# Patient Record
Sex: FEMALE | Race: OTHER | NOT HISPANIC OR LATINO
[De-identification: names, ages, dates, MRNs, and addresses within clinical notes are randomized per-mention and may not be internally consistent; named-entity substitution may affect disease eponyms.]

---

## 2017-02-24 PROBLEM — Z00.00 ENCOUNTER FOR PREVENTIVE HEALTH EXAMINATION: Status: ACTIVE | Noted: 2017-02-24

## 2017-03-15 ENCOUNTER — APPOINTMENT (OUTPATIENT)
Dept: OTOLARYNGOLOGY | Facility: CLINIC | Age: 37
End: 2017-03-15

## 2017-03-16 ENCOUNTER — APPOINTMENT (OUTPATIENT)
Dept: OTOLARYNGOLOGY | Facility: CLINIC | Age: 37
End: 2017-03-16

## 2017-04-05 ENCOUNTER — APPOINTMENT (OUTPATIENT)
Dept: OTOLARYNGOLOGY | Facility: CLINIC | Age: 37
End: 2017-04-05

## 2017-06-05 ENCOUNTER — OUTPATIENT (OUTPATIENT)
Dept: OUTPATIENT SERVICES | Facility: HOSPITAL | Age: 37
LOS: 1 days | Discharge: HOME | End: 2017-06-05

## 2017-06-05 DIAGNOSIS — J03.90 ACUTE TONSILLITIS, UNSPECIFIED: ICD-10-CM

## 2017-06-05 DIAGNOSIS — M06.9 RHEUMATOID ARTHRITIS, UNSPECIFIED: ICD-10-CM

## 2017-06-08 ENCOUNTER — OUTPATIENT (OUTPATIENT)
Dept: OUTPATIENT SERVICES | Facility: HOSPITAL | Age: 37
LOS: 1 days | Discharge: HOME | End: 2017-06-08

## 2017-06-08 DIAGNOSIS — M06.9 RHEUMATOID ARTHRITIS, UNSPECIFIED: ICD-10-CM

## 2017-06-08 DIAGNOSIS — J03.90 ACUTE TONSILLITIS, UNSPECIFIED: ICD-10-CM

## 2017-06-28 DIAGNOSIS — D17.22 BENIGN LIPOMATOUS NEOPLASM OF SKIN AND SUBCUTANEOUS TISSUE OF LEFT ARM: ICD-10-CM

## 2017-06-28 DIAGNOSIS — R22.42 LOCALIZED SWELLING, MASS AND LUMP, LEFT LOWER LIMB: ICD-10-CM

## 2017-06-28 DIAGNOSIS — Z85.850 PERSONAL HISTORY OF MALIGNANT NEOPLASM OF THYROID: ICD-10-CM

## 2017-06-28 DIAGNOSIS — D17.24 BENIGN LIPOMATOUS NEOPLASM OF SKIN AND SUBCUTANEOUS TISSUE OF LEFT LEG: ICD-10-CM

## 2017-06-28 DIAGNOSIS — E66.9 OBESITY, UNSPECIFIED: ICD-10-CM

## 2017-11-08 DIAGNOSIS — E66.9 OBESITY, UNSPECIFIED: ICD-10-CM

## 2017-11-08 DIAGNOSIS — G47.30 SLEEP APNEA, UNSPECIFIED: ICD-10-CM

## 2017-11-08 DIAGNOSIS — D17.9 BENIGN LIPOMATOUS NEOPLASM, UNSPECIFIED: ICD-10-CM

## 2017-11-08 DIAGNOSIS — Z01.818 ENCOUNTER FOR OTHER PREPROCEDURAL EXAMINATION: ICD-10-CM

## 2017-11-08 DIAGNOSIS — K21.9 GASTRO-ESOPHAGEAL REFLUX DISEASE WITHOUT ESOPHAGITIS: ICD-10-CM

## 2017-11-08 DIAGNOSIS — Z87.891 PERSONAL HISTORY OF NICOTINE DEPENDENCE: ICD-10-CM

## 2017-11-08 DIAGNOSIS — M06.9 RHEUMATOID ARTHRITIS, UNSPECIFIED: ICD-10-CM

## 2018-12-06 ENCOUNTER — HOSPITAL ENCOUNTER (EMERGENCY)
Facility: HOSPITAL | Age: 38
Discharge: HOME | End: 2018-12-06
Attending: EMERGENCY MEDICINE | Admitting: EMERGENCY MEDICINE
Payer: COMMERCIAL

## 2018-12-06 ENCOUNTER — APPOINTMENT (EMERGENCY)
Dept: RADIOLOGY | Facility: HOSPITAL | Age: 38
End: 2018-12-06
Payer: COMMERCIAL

## 2018-12-06 VITALS
OXYGEN SATURATION: 100 % | HEIGHT: 66 IN | HEART RATE: 94 BPM | RESPIRATION RATE: 18 BRPM | BODY MASS INDEX: 42.06 KG/M2 | TEMPERATURE: 97.8 F | SYSTOLIC BLOOD PRESSURE: 135 MMHG | WEIGHT: 261.69 LBS | DIASTOLIC BLOOD PRESSURE: 78 MMHG

## 2018-12-06 DIAGNOSIS — S90.511A ABRASION OF RIGHT ANKLE, INITIAL ENCOUNTER: Primary | ICD-10-CM
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
## 2018-12-06 PROCEDURE — 99284 EMERGENCY DEPT VISIT MOD MDM: CPT

## 2018-12-06 PROCEDURE — 73610 X-RAY EXAM OF ANKLE: CPT

## 2018-12-06 PROCEDURE — 73630 X-RAY EXAM OF FOOT: CPT

## 2018-12-06 RX ORDER — ACETAMINOPHEN 325 MG/1
650 TABLET ORAL ONCE
Status: COMPLETED | OUTPATIENT
Start: 2018-12-06 | End: 2018-12-06

## 2018-12-06 RX ORDER — NAPROXEN 500 MG/1
500 TABLET ORAL 2 TIMES DAILY PRN
Qty: 20 TABLET | Refills: 0 | Status: SHIPPED | OUTPATIENT
Start: 2018-12-06 | End: 2019-01-03 | Stop reason: ALTCHOICE

## 2018-12-06 RX ORDER — IBUPROFEN 600 MG/1
600 TABLET ORAL ONCE
Status: COMPLETED | OUTPATIENT
Start: 2018-12-06 | End: 2018-12-06

## 2018-12-06 RX ADMIN — ACETAMINOPHEN 650 MG: 325 TABLET, FILM COATED ORAL at 07:17

## 2018-12-06 RX ADMIN — IBUPROFEN 600 MG: 600 TABLET ORAL at 07:17
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
## 2018-12-06 NOTE — DISCHARGE INSTRUCTIONS
Abrasion   WHAT YOU NEED TO KNOW:   An abrasion is a scrape on your skin  It happens when your skin rubs against a rough surface  Some examples of an abrasion include rug burn, a skinned elbow, or road rash  Abrasions can be many shapes and sizes  The wound may hurt, bleed, bruise, or swell  DISCHARGE INSTRUCTIONS:   Return to the emergency department if:   · The bleeding does not stop after 10 minutes of firm pressure  · You cannot rinse one or more foreign objects out of your wound  · You have red streaks on your skin coming from your wound  Contact your healthcare provider if:   · You have a fever or chills  · Your abrasion is red, warm, swollen, or draining pus  · You have questions or concerns about your condition or care  Care for your abrasion:   · Wash your hands and dry them with a clean towel  · Press a clean cloth against your wound to stop any bleeding  · Rinse your wound with a lot of clean water  Do not use harsh soap, alcohol, or iodine solutions  · Use a clean, wet cloth to remove any objects, such as small pieces of rocks or dirt  · Rub antibiotic ointment on your wound  This may help prevent infection and help your wound heal     · Cover the wound with a non-stick bandage  Change the bandage daily, and if gets wet or dirty  Follow up with your healthcare provider as directed:  Write down your questions so you remember to ask them during your visits  © 2017 [address] Information is for End User's use only and may not be sold, redistributed or otherwise used for commercial purposes  All illustrations and images included in CareNotes® are the copyrighted property of [employer]  or [employer]  The above information is an  only  It is not intended as medical advice for individual conditions or treatments   Talk to your doctor, nurse or pharmacist before following any medical regimen to see if it is safe and effective for you

## 2018-12-10 ENCOUNTER — OFFICE VISIT (OUTPATIENT)
Dept: FAMILY MEDICINE CLINIC | Facility: CLINIC | Age: 38
End: 2018-12-10
Payer: COMMERCIAL

## 2018-12-10 VITALS
OXYGEN SATURATION: 98 % | SYSTOLIC BLOOD PRESSURE: 136 MMHG | BODY MASS INDEX: 41.59 KG/M2 | DIASTOLIC BLOOD PRESSURE: 72 MMHG | WEIGHT: 265 LBS | HEART RATE: 99 BPM | RESPIRATION RATE: 20 BRPM | HEIGHT: 67 IN | TEMPERATURE: 97.6 F

## 2018-12-10 DIAGNOSIS — E89.0 POST-SURGICAL HYPOTHYROIDISM: ICD-10-CM

## 2018-12-10 DIAGNOSIS — Z23 NEED FOR TDAP VACCINATION: ICD-10-CM

## 2018-12-10 DIAGNOSIS — Z23 NEED FOR INFLUENZA VACCINATION: ICD-10-CM

## 2018-12-10 DIAGNOSIS — S90.511A ABRASION OF RIGHT ANKLE, INITIAL ENCOUNTER: ICD-10-CM

## 2018-12-10 DIAGNOSIS — M06.9 RHEUMATOID ARTHRITIS INVOLVING MULTIPLE SITES, UNSPECIFIED RHEUMATOID FACTOR PRESENCE: Primary | ICD-10-CM

## 2018-12-10 DIAGNOSIS — F41.8 DEPRESSION WITH ANXIETY: ICD-10-CM

## 2018-12-10 PROBLEM — E66.01 MORBID OBESITY WITH BMI OF 40.0-44.9, ADULT (HCC): Status: ACTIVE | Noted: 2018-03-27

## 2018-12-10 PROBLEM — K21.9 GASTROESOPHAGEAL REFLUX DISEASE WITHOUT ESOPHAGITIS: Status: ACTIVE | Noted: 2018-03-27

## 2018-12-10 PROBLEM — C73 THYROID CANCER (HCC): Status: ACTIVE | Noted: 2018-03-27

## 2018-12-10 PROBLEM — S90.519A ABRASION OF ANKLE: Status: ACTIVE | Noted: 2018-12-10

## 2018-12-10 PROCEDURE — 90682 RIV4 VACC RECOMBINANT DNA IM: CPT | Performed by: NURSE PRACTITIONER

## 2018-12-10 PROCEDURE — 90471 IMMUNIZATION ADMIN: CPT | Performed by: NURSE PRACTITIONER

## 2018-12-10 PROCEDURE — 90715 TDAP VACCINE 7 YRS/> IM: CPT | Performed by: NURSE PRACTITIONER

## 2018-12-10 PROCEDURE — 1036F TOBACCO NON-USER: CPT | Performed by: NURSE PRACTITIONER

## 2018-12-10 PROCEDURE — 90472 IMMUNIZATION ADMIN EACH ADD: CPT | Performed by: NURSE PRACTITIONER

## 2018-12-10 PROCEDURE — 99204 OFFICE O/P NEW MOD 45 MIN: CPT | Performed by: NURSE PRACTITIONER

## 2018-12-10 PROCEDURE — 3008F BODY MASS INDEX DOCD: CPT | Performed by: NURSE PRACTITIONER

## 2018-12-10 RX ORDER — DOCUSATE SODIUM 100 MG/1
100 CAPSULE, LIQUID FILLED ORAL
COMMUNITY
End: 2020-04-20 | Stop reason: ALTCHOICE

## 2018-12-10 RX ORDER — LEVOTHYROXINE SODIUM 175 UG/1
200 TABLET ORAL
COMMUNITY
Start: 2018-05-14 | End: 2020-04-20 | Stop reason: SDUPTHER

## 2018-12-10 RX ORDER — CEFADROXIL 500 MG/1
500 CAPSULE ORAL EVERY 12 HOURS SCHEDULED
COMMUNITY
End: 2019-01-03 | Stop reason: ALTCHOICE

## 2018-12-10 RX ORDER — ESOMEPRAZOLE MAGNESIUM 40 MG/1
CAPSULE, DELAYED RELEASE ORAL
COMMUNITY
Start: 2016-08-25 | End: 2019-07-12 | Stop reason: ALTCHOICE

## 2018-12-10 RX ORDER — SULFAMETHOXAZOLE AND TRIMETHOPRIM 800; 160 MG/1; MG/1
2 TABLET ORAL EVERY 12 HOURS SCHEDULED
COMMUNITY
End: 2019-01-03 | Stop reason: ALTCHOICE

## 2018-12-10 RX ORDER — FOLIC ACID 1 MG/1
TABLET ORAL
COMMUNITY
Start: 2016-08-11

## 2018-12-10 NOTE — ASSESSMENT & PLAN NOTE
To continue Bactrim and Duricef as prescribed  Advised to begin probiotic  Tdap administered today after exam   Counseled on cleaning gently with eye to have bacterial soap and water and changing dressing daily or sooner if becomes moist or soiled  To call office for fever, increased pain, swelling, erythema, or warmth  Follow-up in 2 weeks for recheck

## 2018-12-10 NOTE — ASSESSMENT & PLAN NOTE
Will have patient sign medical release form to obtain prior records  To continue methotrexate    To continue care with rheumatologist

## 2018-12-10 NOTE — ASSESSMENT & PLAN NOTE
To continue current dose of levothyroxine  To obtain labs as ordered by the endocrinologist   Continue care with endocrinology

## 2018-12-10 NOTE — ASSESSMENT & PLAN NOTE
To begin Zoloft half tab x1 week then 1 tab daily  To continue counseling  To follow up in 2 weeks or sooner if symptoms worsen
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
## 2019-01-03 ENCOUNTER — OFFICE VISIT (OUTPATIENT)
Dept: FAMILY MEDICINE CLINIC | Facility: CLINIC | Age: 39
End: 2019-01-03
Payer: COMMERCIAL

## 2019-01-03 VITALS
OXYGEN SATURATION: 98 % | WEIGHT: 257 LBS | TEMPERATURE: 97 F | DIASTOLIC BLOOD PRESSURE: 72 MMHG | BODY MASS INDEX: 40.34 KG/M2 | SYSTOLIC BLOOD PRESSURE: 124 MMHG | HEIGHT: 67 IN | HEART RATE: 97 BPM | RESPIRATION RATE: 18 BRPM

## 2019-01-03 DIAGNOSIS — S90.511A ABRASION OF RIGHT ANKLE, INITIAL ENCOUNTER: ICD-10-CM

## 2019-01-03 DIAGNOSIS — F41.8 DEPRESSION WITH ANXIETY: ICD-10-CM

## 2019-01-03 PROCEDURE — 99214 OFFICE O/P EST MOD 30 MIN: CPT | Performed by: NURSE PRACTITIONER

## 2019-01-03 PROCEDURE — 3008F BODY MASS INDEX DOCD: CPT | Performed by: NURSE PRACTITIONER

## 2019-01-03 RX ORDER — SERTRALINE HYDROCHLORIDE 100 MG/1
TABLET, FILM COATED ORAL
Qty: 30 TABLET | Refills: 2 | Status: SHIPPED | OUTPATIENT
Start: 2019-01-03 | End: 2019-03-24 | Stop reason: SDUPTHER
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
## 2019-01-03 NOTE — ASSESSMENT & PLAN NOTE
Will increase Zoloft to 100 mg daily  To continue counseling  To follow up in 1 month if anxiety has not improved  Otherwise, follow up in 3 months

## 2019-01-25 ENCOUNTER — OFFICE VISIT (OUTPATIENT)
Dept: BEHAVIORAL/MENTAL HEALTH CLINIC | Facility: CLINIC | Age: 39
End: 2019-01-25
Payer: COMMERCIAL

## 2019-01-25 DIAGNOSIS — F43.10 POST TRAUMATIC STRESS DISORDER (PTSD): Primary | ICD-10-CM

## 2019-01-25 PROCEDURE — 90834 PSYTX W PT 45 MINUTES: CPT | Performed by: SOCIAL WORKER
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
## 2019-02-08 ENCOUNTER — OFFICE VISIT (OUTPATIENT)
Dept: BEHAVIORAL/MENTAL HEALTH CLINIC | Facility: CLINIC | Age: 39
End: 2019-02-08
Payer: COMMERCIAL

## 2019-02-08 DIAGNOSIS — F43.10 POST TRAUMATIC STRESS DISORDER (PTSD): Primary | ICD-10-CM

## 2019-02-08 PROCEDURE — 90834 PSYTX W PT 45 MINUTES: CPT | Performed by: SOCIAL WORKER
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
## 2019-02-15 ENCOUNTER — OFFICE VISIT (OUTPATIENT)
Dept: BEHAVIORAL/MENTAL HEALTH CLINIC | Facility: CLINIC | Age: 39
End: 2019-02-15
Payer: COMMERCIAL

## 2019-02-15 DIAGNOSIS — F43.10 POST TRAUMATIC STRESS DISORDER (PTSD): Primary | ICD-10-CM

## 2019-02-15 PROCEDURE — 90834 PSYTX W PT 45 MINUTES: CPT | Performed by: SOCIAL WORKER
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
## 2019-02-19 ENCOUNTER — OFFICE VISIT (OUTPATIENT)
Dept: BEHAVIORAL/MENTAL HEALTH CLINIC | Facility: CLINIC | Age: 39
End: 2019-02-19
Payer: COMMERCIAL

## 2019-02-19 DIAGNOSIS — F43.10 POST TRAUMATIC STRESS DISORDER (PTSD): Primary | ICD-10-CM

## 2019-02-19 PROCEDURE — 90834 PSYTX W PT 45 MINUTES: CPT | Performed by: SOCIAL WORKER
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
## 2019-03-01 ENCOUNTER — TELEPHONE (OUTPATIENT)
Dept: BEHAVIORAL/MENTAL HEALTH CLINIC | Facility: CLINIC | Age: 39
End: 2019-03-01

## 2019-03-07 ENCOUNTER — OFFICE VISIT (OUTPATIENT)
Dept: URGENT CARE | Age: 39
End: 2019-03-07
Payer: COMMERCIAL

## 2019-03-07 VITALS
HEART RATE: 84 BPM | TEMPERATURE: 98.9 F | WEIGHT: 255 LBS | OXYGEN SATURATION: 100 % | RESPIRATION RATE: 20 BRPM | DIASTOLIC BLOOD PRESSURE: 71 MMHG | HEIGHT: 67 IN | SYSTOLIC BLOOD PRESSURE: 137 MMHG | BODY MASS INDEX: 40.02 KG/M2

## 2019-03-07 DIAGNOSIS — J01.90 ACUTE SINUSITIS, RECURRENCE NOT SPECIFIED, UNSPECIFIED LOCATION: Primary | ICD-10-CM

## 2019-03-07 PROCEDURE — G0382 LEV 3 HOSP TYPE B ED VISIT: HCPCS | Performed by: FAMILY MEDICINE

## 2019-03-07 RX ORDER — AMOXICILLIN 500 MG/1
500 TABLET, FILM COATED ORAL 3 TIMES DAILY
Qty: 30 TABLET | Refills: 0 | Status: SHIPPED | OUTPATIENT
Start: 2019-03-07 | End: 2019-03-17

## 2019-03-07 NOTE — LETTER
March 7, 2019     Patient: Gayatri Kee   YOB: 1980   Date of Visit: 3/7/2019       To Whom It May Concern:    Patient seen in office today for illness  Please excuse from work         Sincerely,        Dulce Maria Pope PA-C    CC: No Recipients

## 2019-03-07 NOTE — PATIENT INSTRUCTIONS
Take medications as directed  May use Mucinex D 1/2 to 1 tablet twice a day with full glass of water for nasal congestion  May also use nasal saline spray for rinsing sinus passages  May continue NyQuil at bedtime as needed  Were symptoms typically last 8-10 days and slowly resolve over the next couple weeks  Follow up with family doctor if not improving as expected

## 2019-03-07 NOTE — PROGRESS NOTES
[facility]    NAME: Mazin Melvin is a 43 y o  female  : 1980    MRN: [id number]  DATE: 2019  TIME: 8:29 AM    Assessment and Plan   Acute sinusitis, recurrence not specified, unspecified location [J01 90]  1  Acute sinusitis, recurrence not specified, unspecified location  amoxicillin (AMOXIL) 500 MG tablet       Patient Instructions     Patient Instructions   Take medications as directed  May use Mucinex D 1/2 to 1 tablet twice a day with full glass of water for nasal congestion  May also use nasal saline spray for rinsing sinus passages  May continue NyQuil at bedtime as needed  Were symptoms typically last 8-10 days and slowly resolve over the next couple weeks  Follow up with family doctor if not improving as expected  Chief Complaint     Chief Complaint   Patient presents with    Cold Like Symptoms     c/o upper respiratory congestin x 3 days, denies n/v/d and bodyaches       History of Present Illness   Mazin Melvin presents to the clinic c/o  71-year-old female with complaint of nasal congestion drainage sinus pain pressure  She is on methotrexate  She has a history of nasal septal deviation  She has been taking over-the-counter Sudafed and NyQuil with minimal relief  Having trouble sleeping at night due to discomfort and difficulty breathing through nose  Review of Systems   Review of Systems   Constitutional: Positive for activity change and fatigue  Negative for chills  HENT: Positive for congestion, postnasal drip, rhinorrhea, sinus pressure and sinus pain  Negative for ear discharge, ear pain, hearing loss and sore throat  Eyes: Negative  Respiratory: Negative  Cardiovascular: Negative  Hematological: Negative          Current Medications     Long-Term Medications   Medication Sig Dispense Refill    docusate sodium (COLACE) 100 mg capsule Take 100 mg by mouth      esomeprazole (NexIUM) 40 MG capsule       folic acid (FOLVITE) 1 mg tablet       levothyroxine 175 mcg tablet 1 tab on 6 days of the week with 1 5 tabs on the 7th day of the week   methotrexate (RHEUMATREX) 2 5 MG tablet Take 12 5 mg by mouth      mupirocin (BACTROBAN) 2 % ointment Apply topically 3 (three) times a day (Patient not taking: Reported on 3/7/2019) 22 g 1    sertraline (ZOLOFT) 100 mg tablet 1 tab daily  30 tablet 2       Current Allergies     Allergies as of 03/07/2019    (No Known Allergies)          The following portions of the patient's history were reviewed and updated as appropriate: allergies, current medications, past family history, past medical history, past social history, past surgical history and problem list   Past Medical History:   Diagnosis Date    Arthritis     RA    Thyroid cancer ([address] )      Past Surgical History:   Procedure Laterality Date    CHOLECYSTECTOMY      KNEE SURGERY Left     ACL repair     THYROIDECTOMY       Family History   Problem Relation Age of Onset    Rheum arthritis Mother     COPD Mother     Seizures Mother     No Known Problems Father     Rheum arthritis Sister     Fibromyalgia Sister     Thyroid disease Sister     Hypertension Maternal Grandmother     Lung cancer Maternal Grandmother     Heart disease Maternal Grandmother     Heart disease Maternal Grandfather     Emphysema Maternal Grandfather        Objective   /71   Pulse 84   Temp 98 9 °F (37 2 °C) (Temporal)   Resp 20   Ht 5' 7" (1 702 m)   Wt 116 kg (255 lb)   LMP 02/06/2019   SpO2 100%   BMI 39 94 kg/m²   Patient's last menstrual period was 02/06/2019  Physical Exam     Physical Exam   Constitutional: She is oriented to person, place, and time  She appears well-developed and well-nourished  No distress  HENT:   Head: Normocephalic and atraumatic  Right Ear: External ear normal    Left Ear: External ear normal    Cobblestoning of posterior pharynx with patchy redness  No exudate or fetid breath    Nasal turbinates red and edematous  No purulent mucus  Sinuses tender to palpation   Eyes: Pupils are equal, round, and reactive to light  Conjunctivae and EOM are normal  Right eye exhibits no discharge  Left eye exhibits no discharge  Neck: Normal range of motion  Neck supple  Cardiovascular: Normal rate, regular rhythm and normal heart sounds  Exam reveals no gallop and no friction rub  No murmur heard  Pulmonary/Chest: Effort normal and breath sounds normal  No respiratory distress  She has no wheezes  She has no rales  Lymphadenopathy:     She has no cervical adenopathy  Neurological: She is alert and oriented to person, place, and time  Skin: Skin is warm and dry  No rash noted  She is not diaphoretic  Psychiatric: She has a normal mood and affect  Nursing note and vitals reviewed

## 2019-03-08 ENCOUNTER — OFFICE VISIT (OUTPATIENT)
Dept: BEHAVIORAL/MENTAL HEALTH CLINIC | Facility: CLINIC | Age: 39
End: 2019-03-08
Payer: COMMERCIAL

## 2019-03-08 DIAGNOSIS — F43.10 POST TRAUMATIC STRESS DISORDER (PTSD): Primary | ICD-10-CM

## 2019-03-08 PROCEDURE — 90834 PSYTX W PT 45 MINUTES: CPT | Performed by: SOCIAL WORKER
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
## 2019-03-15 ENCOUNTER — OFFICE VISIT (OUTPATIENT)
Dept: BEHAVIORAL/MENTAL HEALTH CLINIC | Facility: CLINIC | Age: 39
End: 2019-03-15
Payer: COMMERCIAL

## 2019-03-15 DIAGNOSIS — F43.10 POST TRAUMATIC STRESS DISORDER (PTSD): Primary | ICD-10-CM

## 2019-03-15 PROCEDURE — 90834 PSYTX W PT 45 MINUTES: CPT | Performed by: SOCIAL WORKER
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
## 2019-03-24 DIAGNOSIS — F41.8 DEPRESSION WITH ANXIETY: ICD-10-CM

## 2019-03-25 RX ORDER — SERTRALINE HYDROCHLORIDE 100 MG/1
TABLET, FILM COATED ORAL
Qty: 30 TABLET | Refills: 1 | Status: SHIPPED | OUTPATIENT
Start: 2019-03-25 | End: 2019-07-09 | Stop reason: SDUPTHER

## 2019-03-29 ENCOUNTER — OFFICE VISIT (OUTPATIENT)
Dept: BEHAVIORAL/MENTAL HEALTH CLINIC | Facility: CLINIC | Age: 39
End: 2019-03-29
Payer: COMMERCIAL

## 2019-03-29 DIAGNOSIS — F43.10 POST TRAUMATIC STRESS DISORDER (PTSD): Primary | ICD-10-CM

## 2019-03-29 PROCEDURE — 90834 PSYTX W PT 45 MINUTES: CPT | Performed by: SOCIAL WORKER
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
## 2019-04-05 ENCOUNTER — OFFICE VISIT (OUTPATIENT)
Dept: BEHAVIORAL/MENTAL HEALTH CLINIC | Facility: CLINIC | Age: 39
End: 2019-04-05
Payer: COMMERCIAL

## 2019-04-05 DIAGNOSIS — F43.10 POST TRAUMATIC STRESS DISORDER (PTSD): Primary | ICD-10-CM

## 2019-04-05 PROCEDURE — 90834 PSYTX W PT 45 MINUTES: CPT | Performed by: SOCIAL WORKER

## 2019-04-08 ENCOUNTER — OFFICE VISIT (OUTPATIENT)
Dept: FAMILY MEDICINE CLINIC | Facility: CLINIC | Age: 39
End: 2019-04-08
Payer: COMMERCIAL

## 2019-04-08 VITALS
TEMPERATURE: 97.4 F | OXYGEN SATURATION: 98 % | HEART RATE: 100 BPM | RESPIRATION RATE: 16 BRPM | BODY MASS INDEX: 40.97 KG/M2 | WEIGHT: 261 LBS | SYSTOLIC BLOOD PRESSURE: 110 MMHG | HEIGHT: 67 IN | DIASTOLIC BLOOD PRESSURE: 70 MMHG

## 2019-04-08 DIAGNOSIS — F41.8 DEPRESSION WITH ANXIETY: ICD-10-CM

## 2019-04-08 DIAGNOSIS — N92.0 MENORRHAGIA WITH REGULAR CYCLE: ICD-10-CM

## 2019-04-08 DIAGNOSIS — G47.33 OBSTRUCTIVE SLEEP APNEA SYNDROME: Primary | ICD-10-CM

## 2019-04-08 DIAGNOSIS — E66.01 MORBID OBESITY WITH BMI OF 40.0-44.9, ADULT (HCC): ICD-10-CM

## 2019-04-08 PROBLEM — S90.519A ABRASION OF ANKLE: Status: RESOLVED | Noted: 2018-12-10 | Resolved: 2019-04-08

## 2019-04-08 PROBLEM — G47.30 SLEEP APNEA: Status: ACTIVE | Noted: 2019-04-08

## 2019-04-08 PROCEDURE — 99214 OFFICE O/P EST MOD 30 MIN: CPT | Performed by: NURSE PRACTITIONER

## 2019-04-08 PROCEDURE — 1036F TOBACCO NON-USER: CPT | Performed by: NURSE PRACTITIONER

## 2019-04-08 PROCEDURE — 3008F BODY MASS INDEX DOCD: CPT | Performed by: NURSE PRACTITIONER

## 2019-04-08 RX ORDER — BUSPIRONE HYDROCHLORIDE 5 MG/1
5 TABLET ORAL 3 TIMES DAILY
Qty: 90 TABLET | Refills: 0 | Status: SHIPPED | OUTPATIENT
Start: 2019-04-08 | End: 2019-05-28 | Stop reason: DRUGHIGH

## 2019-04-12 ENCOUNTER — OFFICE VISIT (OUTPATIENT)
Dept: BEHAVIORAL/MENTAL HEALTH CLINIC | Facility: CLINIC | Age: 39
End: 2019-04-12
Payer: COMMERCIAL

## 2019-04-12 DIAGNOSIS — F43.10 POST TRAUMATIC STRESS DISORDER (PTSD): Primary | ICD-10-CM

## 2019-04-12 PROCEDURE — 90834 PSYTX W PT 45 MINUTES: CPT | Performed by: SOCIAL WORKER

## 2019-04-19 ENCOUNTER — OFFICE VISIT (OUTPATIENT)
Dept: BEHAVIORAL/MENTAL HEALTH CLINIC | Facility: CLINIC | Age: 39
End: 2019-04-19
Payer: COMMERCIAL

## 2019-04-19 DIAGNOSIS — F43.10 POST TRAUMATIC STRESS DISORDER (PTSD): Primary | ICD-10-CM

## 2019-04-19 PROCEDURE — 90834 PSYTX W PT 45 MINUTES: CPT | Performed by: SOCIAL WORKER

## 2019-05-03 ENCOUNTER — OFFICE VISIT (OUTPATIENT)
Dept: BEHAVIORAL/MENTAL HEALTH CLINIC | Facility: CLINIC | Age: 39
End: 2019-05-03
Payer: COMMERCIAL

## 2019-05-03 DIAGNOSIS — F43.10 POST TRAUMATIC STRESS DISORDER (PTSD): Primary | ICD-10-CM

## 2019-05-03 PROCEDURE — 90834 PSYTX W PT 45 MINUTES: CPT | Performed by: SOCIAL WORKER

## 2019-05-10 ENCOUNTER — TELEPHONE (OUTPATIENT)
Dept: BEHAVIORAL/MENTAL HEALTH CLINIC | Facility: CLINIC | Age: 39
End: 2019-05-10

## 2019-05-17 ENCOUNTER — OFFICE VISIT (OUTPATIENT)
Dept: BEHAVIORAL/MENTAL HEALTH CLINIC | Facility: CLINIC | Age: 39
End: 2019-05-17
Payer: COMMERCIAL

## 2019-05-17 DIAGNOSIS — F43.10 POST TRAUMATIC STRESS DISORDER (PTSD): Primary | ICD-10-CM

## 2019-05-17 PROCEDURE — 90834 PSYTX W PT 45 MINUTES: CPT | Performed by: SOCIAL WORKER

## 2019-05-24 ENCOUNTER — OFFICE VISIT (OUTPATIENT)
Dept: BEHAVIORAL/MENTAL HEALTH CLINIC | Facility: CLINIC | Age: 39
End: 2019-05-24
Payer: COMMERCIAL

## 2019-05-24 DIAGNOSIS — F43.10 POST TRAUMATIC STRESS DISORDER (PTSD): Primary | ICD-10-CM

## 2019-05-24 PROCEDURE — 90834 PSYTX W PT 45 MINUTES: CPT | Performed by: SOCIAL WORKER

## 2019-05-28 ENCOUNTER — OFFICE VISIT (OUTPATIENT)
Dept: FAMILY MEDICINE CLINIC | Facility: CLINIC | Age: 39
End: 2019-05-28
Payer: COMMERCIAL

## 2019-05-28 VITALS
WEIGHT: 267 LBS | DIASTOLIC BLOOD PRESSURE: 82 MMHG | OXYGEN SATURATION: 98 % | SYSTOLIC BLOOD PRESSURE: 118 MMHG | TEMPERATURE: 98.1 F | HEIGHT: 67 IN | HEART RATE: 78 BPM | BODY MASS INDEX: 41.91 KG/M2

## 2019-05-28 DIAGNOSIS — F41.8 DEPRESSION WITH ANXIETY: Primary | ICD-10-CM

## 2019-05-28 DIAGNOSIS — B49 FUNGAL INFECTION: ICD-10-CM

## 2019-05-28 DIAGNOSIS — B37.9 YEAST INFECTION: ICD-10-CM

## 2019-05-28 DIAGNOSIS — R73.01 IMPAIRED FASTING GLUCOSE: ICD-10-CM

## 2019-05-28 LAB — SL AMB POCT HEMOGLOBIN AIC: 5.6 (ref ?–6.5)

## 2019-05-28 PROCEDURE — 36415 COLL VENOUS BLD VENIPUNCTURE: CPT | Performed by: NURSE PRACTITIONER

## 2019-05-28 PROCEDURE — 99214 OFFICE O/P EST MOD 30 MIN: CPT | Performed by: NURSE PRACTITIONER

## 2019-05-28 PROCEDURE — 3008F BODY MASS INDEX DOCD: CPT | Performed by: NURSE PRACTITIONER

## 2019-05-28 PROCEDURE — 83036 HEMOGLOBIN GLYCOSYLATED A1C: CPT | Performed by: NURSE PRACTITIONER

## 2019-05-28 RX ORDER — NYSTATIN 100000 [USP'U]/G
POWDER TOPICAL 3 TIMES DAILY
Qty: 15 G | Refills: 0 | Status: SHIPPED | OUTPATIENT
Start: 2019-05-28 | End: 2020-08-20 | Stop reason: ALTCHOICE

## 2019-05-28 RX ORDER — FLUCONAZOLE 150 MG/1
150 TABLET ORAL DAILY
Qty: 2 TABLET | Refills: 0 | Status: SHIPPED | OUTPATIENT
Start: 2019-05-28 | End: 2019-05-30

## 2019-05-28 RX ORDER — BUSPIRONE HYDROCHLORIDE 7.5 MG/1
7.5 TABLET ORAL 3 TIMES DAILY
Qty: 30 TABLET | Refills: 0 | Status: SHIPPED | OUTPATIENT
Start: 2019-05-28 | End: 2019-07-09 | Stop reason: SDUPTHER

## 2019-05-29 PROBLEM — R73.01 IMPAIRED FASTING GLUCOSE: Status: ACTIVE | Noted: 2019-05-29

## 2019-05-29 RX ORDER — KETOCONAZOLE 20 MG/G
CREAM TOPICAL DAILY
Qty: 15 G | Refills: 0 | Status: SHIPPED | OUTPATIENT
Start: 2019-05-29 | End: 2020-03-13

## 2019-06-04 ENCOUNTER — CONSULT (OUTPATIENT)
Dept: PULMONOLOGY | Facility: CLINIC | Age: 39
End: 2019-06-04
Payer: COMMERCIAL

## 2019-06-04 VITALS
HEIGHT: 67 IN | DIASTOLIC BLOOD PRESSURE: 80 MMHG | HEART RATE: 82 BPM | OXYGEN SATURATION: 98 % | SYSTOLIC BLOOD PRESSURE: 120 MMHG | WEIGHT: 265 LBS | BODY MASS INDEX: 41.59 KG/M2

## 2019-06-04 DIAGNOSIS — E66.01 MORBID OBESITY WITH BMI OF 40.0-44.9, ADULT (HCC): ICD-10-CM

## 2019-06-04 DIAGNOSIS — G47.33 OSA (OBSTRUCTIVE SLEEP APNEA): Primary | ICD-10-CM

## 2019-06-04 PROCEDURE — 99244 OFF/OP CNSLTJ NEW/EST MOD 40: CPT | Performed by: PHYSICIAN ASSISTANT

## 2019-06-10 ENCOUNTER — TELEPHONE (OUTPATIENT)
Dept: PULMONOLOGY | Facility: CLINIC | Age: 39
End: 2019-06-10

## 2019-06-10 ENCOUNTER — TELEPHONE (OUTPATIENT)
Dept: BEHAVIORAL/MENTAL HEALTH CLINIC | Facility: CLINIC | Age: 39
End: 2019-06-10

## 2019-06-10 DIAGNOSIS — G47.33 OSA (OBSTRUCTIVE SLEEP APNEA): Primary | ICD-10-CM

## 2019-06-14 ENCOUNTER — OFFICE VISIT (OUTPATIENT)
Dept: BEHAVIORAL/MENTAL HEALTH CLINIC | Facility: CLINIC | Age: 39
End: 2019-06-14
Payer: COMMERCIAL

## 2019-06-14 DIAGNOSIS — F43.10 POST TRAUMATIC STRESS DISORDER (PTSD): Primary | ICD-10-CM

## 2019-06-14 PROCEDURE — 90834 PSYTX W PT 45 MINUTES: CPT | Performed by: SOCIAL WORKER

## 2019-06-17 ENCOUNTER — TELEPHONE (OUTPATIENT)
Dept: SLEEP CENTER | Facility: CLINIC | Age: 39
End: 2019-06-17

## 2019-06-20 ENCOUNTER — HOSPITAL ENCOUNTER (EMERGENCY)
Facility: HOSPITAL | Age: 39
Discharge: HOME | End: 2019-06-21
Attending: EMERGENCY MEDICINE | Admitting: EMERGENCY MEDICINE
Payer: COMMERCIAL

## 2019-06-20 ENCOUNTER — APPOINTMENT (EMERGENCY)
Dept: CT IMAGING | Facility: HOSPITAL | Age: 39
End: 2019-06-20
Payer: COMMERCIAL

## 2019-06-20 DIAGNOSIS — R10.9 ABDOMINAL PAIN: Primary | ICD-10-CM

## 2019-06-20 LAB
ALBUMIN SERPL BCP-MCNC: 3.8 G/DL (ref 3.5–5)
ALP SERPL-CCNC: 50 U/L (ref 46–116)
ALT SERPL W P-5'-P-CCNC: 28 U/L (ref 12–78)
ANION GAP SERPL CALCULATED.3IONS-SCNC: 8 MMOL/L (ref 4–13)
AST SERPL W P-5'-P-CCNC: 22 U/L (ref 5–45)
BACTERIA UR QL AUTO: ABNORMAL /HPF
BASOPHILS # BLD AUTO: 0.04 THOUSANDS/ΜL (ref 0–0.1)
BASOPHILS NFR BLD AUTO: 0 % (ref 0–1)
BILIRUB DIRECT SERPL-MCNC: 0.03 MG/DL (ref 0–0.2)
BILIRUB SERPL-MCNC: 0.2 MG/DL (ref 0.2–1)
BILIRUB UR QL STRIP: NEGATIVE
BUN SERPL-MCNC: 12 MG/DL (ref 5–25)
CALCIUM SERPL-MCNC: 8 MG/DL (ref 8.3–10.1)
CHLORIDE SERPL-SCNC: 102 MMOL/L (ref 100–108)
CLARITY UR: CLEAR
CO2 SERPL-SCNC: 28 MMOL/L (ref 21–32)
COLOR UR: YELLOW
CREAT SERPL-MCNC: 1.01 MG/DL (ref 0.6–1.3)
EOSINOPHIL # BLD AUTO: 0.11 THOUSAND/ΜL (ref 0–0.61)
EOSINOPHIL NFR BLD AUTO: 1 % (ref 0–6)
ERYTHROCYTE [DISTWIDTH] IN BLOOD BY AUTOMATED COUNT: 13.3 % (ref 11.6–15.1)
GFR SERPL CREATININE-BSD FRML MDRD: 70 ML/MIN/1.73SQ M
GLUCOSE SERPL-MCNC: 105 MG/DL (ref 65–140)
GLUCOSE UR STRIP-MCNC: NEGATIVE MG/DL
HCG SERPL QL: NEGATIVE
HCT VFR BLD AUTO: 42.4 % (ref 34.8–46.1)
HGB BLD-MCNC: 13.8 G/DL (ref 11.5–15.4)
HGB UR QL STRIP.AUTO: ABNORMAL
KETONES UR STRIP-MCNC: NEGATIVE MG/DL
LEUKOCYTE ESTERASE UR QL STRIP: NEGATIVE
LIPASE SERPL-CCNC: 90 U/L (ref 73–393)
LYMPHOCYTES # BLD AUTO: 1.88 THOUSANDS/ΜL (ref 0.6–4.47)
LYMPHOCYTES NFR BLD AUTO: 20 % (ref 14–44)
MCH RBC QN AUTO: 29.8 PG (ref 26.8–34.3)
MCHC RBC AUTO-ENTMCNC: 32.5 G/DL (ref 31.4–37.4)
MCV RBC AUTO: 92 FL (ref 82–98)
MONOCYTES # BLD AUTO: 0.64 THOUSAND/ΜL (ref 0.17–1.22)
MONOCYTES NFR BLD AUTO: 7 % (ref 4–12)
NEUTROPHILS # BLD AUTO: 6.81 THOUSANDS/ΜL (ref 1.85–7.62)
NEUTS SEG NFR BLD AUTO: 72 % (ref 43–75)
NITRITE UR QL STRIP: NEGATIVE
NON-SQ EPI CELLS URNS QL MICRO: ABNORMAL /HPF
PH UR STRIP.AUTO: 6 [PH]
PLATELET # BLD AUTO: 377 THOUSANDS/UL (ref 149–390)
PMV BLD AUTO: 9.8 FL (ref 8.9–12.7)
POTASSIUM SERPL-SCNC: 3.8 MMOL/L (ref 3.5–5.3)
PROT SERPL-MCNC: 7.8 G/DL (ref 6.4–8.2)
PROT UR STRIP-MCNC: NEGATIVE MG/DL
RBC # BLD AUTO: 4.63 MILLION/UL (ref 3.81–5.12)
RBC #/AREA URNS AUTO: ABNORMAL /HPF
SODIUM SERPL-SCNC: 138 MMOL/L (ref 136–145)
SP GR UR STRIP.AUTO: 1.01 (ref 1–1.03)
UROBILINOGEN UR QL STRIP.AUTO: 1 E.U./DL
WBC # BLD AUTO: 9.48 THOUSAND/UL (ref 4.31–10.16)
WBC #/AREA URNS AUTO: ABNORMAL /HPF

## 2019-06-20 PROCEDURE — 80076 HEPATIC FUNCTION PANEL: CPT | Performed by: EMERGENCY MEDICINE

## 2019-06-20 PROCEDURE — 99285 EMERGENCY DEPT VISIT HI MDM: CPT | Performed by: EMERGENCY MEDICINE

## 2019-06-20 PROCEDURE — 85025 COMPLETE CBC W/AUTO DIFF WBC: CPT | Performed by: EMERGENCY MEDICINE

## 2019-06-20 PROCEDURE — 81001 URINALYSIS AUTO W/SCOPE: CPT | Performed by: EMERGENCY MEDICINE

## 2019-06-20 PROCEDURE — 74177 CT ABD & PELVIS W/CONTRAST: CPT

## 2019-06-20 PROCEDURE — 36415 COLL VENOUS BLD VENIPUNCTURE: CPT | Performed by: EMERGENCY MEDICINE

## 2019-06-20 PROCEDURE — 96375 TX/PRO/DX INJ NEW DRUG ADDON: CPT

## 2019-06-20 PROCEDURE — 96374 THER/PROPH/DIAG INJ IV PUSH: CPT

## 2019-06-20 PROCEDURE — 84703 CHORIONIC GONADOTROPIN ASSAY: CPT | Performed by: EMERGENCY MEDICINE

## 2019-06-20 PROCEDURE — 83690 ASSAY OF LIPASE: CPT | Performed by: EMERGENCY MEDICINE

## 2019-06-20 PROCEDURE — 80048 BASIC METABOLIC PNL TOTAL CA: CPT | Performed by: EMERGENCY MEDICINE

## 2019-06-20 PROCEDURE — 99284 EMERGENCY DEPT VISIT MOD MDM: CPT

## 2019-06-20 RX ORDER — ONDANSETRON 2 MG/ML
4 INJECTION INTRAMUSCULAR; INTRAVENOUS ONCE
Status: COMPLETED | OUTPATIENT
Start: 2019-06-20 | End: 2019-06-20

## 2019-06-20 RX ORDER — HYDROMORPHONE HCL/PF 1 MG/ML
1 SYRINGE (ML) INJECTION ONCE
Status: COMPLETED | OUTPATIENT
Start: 2019-06-20 | End: 2019-06-20

## 2019-06-20 RX ADMIN — HYDROMORPHONE HYDROCHLORIDE 1 MG: 1 INJECTION, SOLUTION INTRAMUSCULAR; INTRAVENOUS; SUBCUTANEOUS at 22:39

## 2019-06-20 RX ADMIN — IOHEXOL 100 ML: 350 INJECTION, SOLUTION INTRAVENOUS at 23:38

## 2019-06-20 RX ADMIN — ONDANSETRON 4 MG: 2 INJECTION INTRAMUSCULAR; INTRAVENOUS at 22:39

## 2019-06-21 VITALS
TEMPERATURE: 97.7 F | RESPIRATION RATE: 18 BRPM | HEART RATE: 78 BPM | SYSTOLIC BLOOD PRESSURE: 102 MMHG | DIASTOLIC BLOOD PRESSURE: 56 MMHG | OXYGEN SATURATION: 98 %

## 2019-06-21 RX ORDER — DICYCLOMINE HCL 20 MG
20 TABLET ORAL 2 TIMES DAILY
Qty: 20 TABLET | Refills: 0 | Status: SHIPPED | OUTPATIENT
Start: 2019-06-21 | End: 2019-07-12 | Stop reason: ALTCHOICE

## 2019-06-28 ENCOUNTER — SOCIAL WORK (OUTPATIENT)
Dept: BEHAVIORAL/MENTAL HEALTH CLINIC | Facility: CLINIC | Age: 39
End: 2019-06-28
Payer: COMMERCIAL

## 2019-06-28 DIAGNOSIS — F41.1 GENERALIZED ANXIETY DISORDER: Primary | ICD-10-CM

## 2019-06-28 PROCEDURE — 90834 PSYTX W PT 45 MINUTES: CPT | Performed by: SOCIAL WORKER

## 2019-07-09 DIAGNOSIS — F41.8 DEPRESSION WITH ANXIETY: ICD-10-CM

## 2019-07-09 RX ORDER — SERTRALINE HYDROCHLORIDE 100 MG/1
TABLET, FILM COATED ORAL
Qty: 30 TABLET | Refills: 1 | Status: SHIPPED | OUTPATIENT
Start: 2019-07-09 | End: 2019-09-03 | Stop reason: SDUPTHER

## 2019-07-09 RX ORDER — BUSPIRONE HYDROCHLORIDE 7.5 MG/1
7.5 TABLET ORAL 3 TIMES DAILY
Qty: 30 TABLET | Refills: 0 | Status: SHIPPED | OUTPATIENT
Start: 2019-07-09 | End: 2020-03-13

## 2019-07-10 ENCOUNTER — OFFICE VISIT (OUTPATIENT)
Dept: GASTROENTEROLOGY | Facility: CLINIC | Age: 39
End: 2019-07-10
Payer: COMMERCIAL

## 2019-07-10 ENCOUNTER — PREP FOR PROCEDURE (OUTPATIENT)
Dept: GASTROENTEROLOGY | Facility: CLINIC | Age: 39
End: 2019-07-10

## 2019-07-10 VITALS
SYSTOLIC BLOOD PRESSURE: 102 MMHG | HEIGHT: 67 IN | HEART RATE: 77 BPM | BODY MASS INDEX: 41.97 KG/M2 | WEIGHT: 267.4 LBS | DIASTOLIC BLOOD PRESSURE: 70 MMHG

## 2019-07-10 DIAGNOSIS — R10.32 LEFT LOWER QUADRANT PAIN: Primary | ICD-10-CM

## 2019-07-10 PROCEDURE — 99204 OFFICE O/P NEW MOD 45 MIN: CPT | Performed by: PHYSICIAN ASSISTANT

## 2019-07-10 NOTE — PROGRESS NOTES
[facility] - Outpatient Consultation  Matthew Giordano 43 y o  female MRN: [id number]  Encounter: [id number]          ASSESSMENT AND PLAN:      1  Left lower quadrant pain  She has a long history of recurring left-sided abdominal pain  She was previously seeing a [facility] and has had several colonoscopies and an upper endoscopy  Will update testing  Obtain old records  She does have associated diarrhea-suspect patient has irritable bowel syndrome  Discussed starting a fiber supplement and probiotic  Use Donnatal as needed for pain  She has acid reflux and no prescription coverage - samples of Prilosec  provided she has significant nighttime symptoms thus will take twice daily    ______________________________________________________________________    HPI:  39-year-old female presents for evaluation of abdominal pain  Patient reports she has had recurring left-sided abdominal pain for the past several years  Recently the pain has become more persistent and severe  It is associated with diarrhea and nausea  She went to the emergency room on June 20th due to rectal bleeding  CT scan and labs were unremarkable  She previously been seen a gastroenterologist in [place]  She reports that she had several colonoscopies in the past   She believes her last exam was 4 5 years ago  She has a history adenomatous polyps  She also has a history of acid reflux and has had an upper endoscopy  She believes she was diagnosed with ulcers  She has been taking Nexium 40 mg daily  Although it does help she still reports significant nocturnal symptoms  She reports that she wakes up in the middle of the night frequent only with chest pressure and more recently with regurgitation  She does admit to increased stress over the past few months  She denies any unexpected weight loss  REVIEW OF SYSTEMS:    CONSTITUTIONAL: Denies any fever, chills, rigors, and weight loss    HEENT: No earache or tinnitus  Denies hearing loss or visual disturbances  CARDIOVASCULAR: No chest pain or palpitations  RESPIRATORY: Denies any cough, hemoptysis, shortness of breath or dyspnea on exertion  GASTROINTESTINAL: As noted in the History of Present Illness  GENITOURINARY: No problems with urination  Denies any hematuria or dysuria  NEUROLOGIC: No dizziness or vertigo, denies headaches  MUSCULOSKELETAL: Denies any muscle or joint pain  SKIN: Denies skin rashes or itching  ENDOCRINE: Denies excessive thirst  Denies intolerance to heat or cold  PSYCHOSOCIAL: Denies depression or anxiety  Denies any recent memory loss         Historical Information   Past Medical History:   Diagnosis Date    Arthritis     RA    Thyroid cancer ([facility] [address] )      Past Surgical History:   Procedure Laterality Date    CHOLECYSTECTOMY      KNEE SURGERY Left     ACL repair     KNEE SURGERY      THYROIDECTOMY       Social History   Social History     Substance and Sexual Activity   Alcohol Use Not on file    Comment: social     Social History     Substance and Sexual Activity   Drug Use Never     Social History     Tobacco Use   Smoking Status Former Smoker   Smokeless Tobacco Never Used   Tobacco Comment    quit 5 years      Family History   Problem Relation Age of Onset    Rheum arthritis Mother     COPD Mother     Seizures Mother     No Known Problems Father     Rheum arthritis Sister     Fibromyalgia Sister     Thyroid disease Sister     Hypertension Maternal Grandmother     Lung cancer Maternal Grandmother     Heart disease Maternal Grandmother     Heart disease Maternal Grandfather     Emphysema Maternal Grandfather        Meds/Allergies       Current Outpatient Medications:     busPIRone (BUSPAR) 7 5 mg tablet    Cholecalciferol (VITAMIN D-3) 5000 units TABS    dicyclomine (BENTYL) 20 mg tablet    docusate sodium (COLACE) 100 mg capsule    esomeprazole (NexIUM) 40 MG capsule    folic acid (FOLVITE) 1 mg tablet    ketoconazole (NIZORAL) 2 % cream    levothyroxine 175 mcg tablet    methotrexate (RHEUMATREX) 2 5 MG tablet    nystatin (MYCOSTATIN) powder    sertraline (ZOLOFT) 100 mg tablet    No Known Allergies        Objective     Blood pressure 102/70, pulse 77, height 5' 7" (1 702 m), weight 121 kg (267 lb 6 4 oz), last menstrual period 06/20/2019  Body mass index is 41 88 kg/m²  PHYSICAL EXAM:      General Appearance:   Alert, cooperative, no distress   HEENT:   Normocephalic, atraumatic, anicteric      Neck:  Supple, symmetrical, trachea midline   Lungs:   Clear to auscultation bilaterally; no rales, rhonchi or wheezing; respirations unlabored    Heart[de-identified]   Regular rate and rhythm; no murmur, rub, or gallop  Abdomen:   Soft, non-tender, non-distended; normal bowel sounds; no masses, no organomegaly    Genitalia:   Deferred    Rectal:   Deferred    Extremities:  No cyanosis, clubbing or edema    Pulses:  2+ and symmetric    Skin:  No jaundice, rashes, or lesions    Lymph nodes:  No palpable cervical lymphadenopathy        Lab Results:   No visits with results within 1 Day(s) from this visit     Latest known visit with results is:   Admission on 06/20/2019, Discharged on 06/21/2019   Component Date Value    Sodium 06/20/2019 138     Potassium 06/20/2019 3 8     Chloride 06/20/2019 102     CO2 06/20/2019 28     ANION GAP 06/20/2019 8     BUN 06/20/2019 12     Creatinine 06/20/2019 1 01     Glucose 06/20/2019 105     Calcium 06/20/2019 8 0*    eGFR 06/20/2019 70     Lipase 06/20/2019 90     Total Bilirubin 06/20/2019 0 20     Bilirubin, Direct 06/20/2019 0 03     Alkaline Phosphatase 06/20/2019 50     AST 06/20/2019 22     ALT 06/20/2019 28     Total Protein 06/20/2019 7 8     Albumin 06/20/2019 3 8     WBC 06/20/2019 9 48     RBC 06/20/2019 4 63     Hemoglobin 06/20/2019 13 8     Hematocrit 06/20/2019 42 4     MCV 06/20/2019 92     MCH 06/20/2019 29 8     MCHC 06/20/2019 32 5  RDW 06/20/2019 13 3     MPV 06/20/2019 9 8     Platelets 08/93/8912 377     Neutrophils Relative 06/20/2019 72     Lymphocytes Relative 06/20/2019 20     Monocytes Relative 06/20/2019 7     Eosinophils Relative 06/20/2019 1     Basophils Relative 06/20/2019 0     Neutrophils Absolute 06/20/2019 6 81     Lymphocytes Absolute 06/20/2019 1 88     Monocytes Absolute 06/20/2019 0 64     Eosinophils Absolute 06/20/2019 0 11     Basophils Absolute 06/20/2019 0 04     Color, UA 06/20/2019 Yellow     Clarity, UA 06/20/2019 Clear     Specific Gravity, UA 06/20/2019 1 015     pH, UA 06/20/2019 6 0     Leukocytes, UA 06/20/2019 Negative     Nitrite, UA 06/20/2019 Negative     Protein, UA 06/20/2019 Negative     Glucose, UA 06/20/2019 Negative     Ketones, UA 06/20/2019 Negative     Urobilinogen, UA 06/20/2019 1 0     Bilirubin, UA 06/20/2019 Negative     Blood, UA 06/20/2019 Moderate*    Preg, Serum 06/20/2019 Negative     RBC, UA 06/20/2019 2-4*    WBC, UA 06/20/2019 0-1*    Epithelial Cells 06/20/2019 Occasional     Bacteria, UA 06/20/2019 None Seen          Radiology Results:   Ct Abdomen Pelvis With Contrast    Result Date: 6/21/2019  Narrative: CT ABDOMEN AND PELVIS WITH IV CONTRAST INDICATION:   LLQ abdominal pain eval diverticulitis  COMPARISON:  None  TECHNIQUE:  CT examination of the abdomen and pelvis was performed  Axial, sagittal, and coronal 2D reformatted images were created from the source data and submitted for interpretation  Radiation dose length product (DLP) for this visit:  9633 0859 mGy-cm   This examination, like all CT scans performed in the [facility], was performed utilizing techniques to minimize radiation dose exposure, including the use of iterative reconstruction and automated exposure control  IV Contrast:  100 mL of iohexol (OMNIPAQUE) Enteric Contrast:  Enteric contrast was not administered   FINDINGS: ABDOMEN LOWER CHEST:  No clinically significant abnormality identified in the visualized lower chest  LIVER/BILIARY TREE:  Right hepatic lobe hemangioma  GALLBLADDER:  Gallbladder is surgically absent  SPLEEN:  Unremarkable  PANCREAS:  Unremarkable  ADRENAL GLANDS:  Unremarkable  KIDNEYS/URETERS:  One or more sharply circumscribed subcentimeter renal hypodensities are noted  These lesions are too small to accurately characterize, but are statistically most likely to represent benign cortical renal cyst(s)  According to the guidelines published in the [facility] Paper of the ACR Incidental Findings Committee (Radiology 2010), no further workup of these lesions is recommended  STOMACH AND BOWEL:  Unremarkable  APPENDIX:  No findings to suggest appendicitis  ABDOMINOPELVIC CAVITY:  No ascites or free intraperitoneal air  No lymphadenopathy  VESSELS:  Unremarkable for patient's age  PELVIS REPRODUCTIVE ORGANS:  Unremarkable for patient's age  URINARY BLADDER:  Unremarkable  ABDOMINAL WALL/INGUINAL REGIONS:  Unremarkable  OSSEOUS STRUCTURES:  No acute fracture or destructive osseous lesion  Impression: No evidence of acute intra-abdominal pelvic pathology   Workstation performed: [id number]

## 2019-07-11 ENCOUNTER — SOCIAL WORK (OUTPATIENT)
Dept: BEHAVIORAL/MENTAL HEALTH CLINIC | Facility: CLINIC | Age: 39
End: 2019-07-11
Payer: COMMERCIAL

## 2019-07-11 DIAGNOSIS — F41.1 GENERALIZED ANXIETY DISORDER: Primary | ICD-10-CM

## 2019-07-11 PROCEDURE — 90834 PSYTX W PT 45 MINUTES: CPT | Performed by: SOCIAL WORKER

## 2019-07-11 NOTE — PSYCH
Time In 8:30 am, Time Out 9:15 am Session length 45 minutes  Assessment/Plan:   Client continues to make progress, regarding insight in to past poor coping and implementing improved healthier coping skills  Diagnoses and all orders for this visit:    Generalized anxiety disorder          Subjective: Client reported some increased emotional reactivity this week as she addressed the issue of the relationship with gary  Patient ID: Mao Summers is a 43 y o  female  Client reported that feeling that she had to leave the relationship while Lobo was not in the house, she left last weekend, and then realized that she was "running" away as she has in the past and needs to do something different  With support of her sister and friend, she was able to return to the house, talk with Lobo and the kids, and map out that they will give the relationship till the end of the year  They talked about identifying three of four items that need to happen in order to measure progress, Lobo is now in therapy, and returning to school  Client was proud of herself for recognizing her past pattern how it was effecting her and those she loves and change her course in a healthy manner  Session focused on how far client had come, processed how she was able to recognize behavior and how she feels with her course correction  Client gave praise to her stepson for his insight and that together the family was able to process what occurred and how to move forward in a healthy way  She is unclear if Lobo will be able to move forward but now they are on the same page regarding what is needed  She will be seen again in two weeks  Review of Systems   Psychiatric/Behavioral: The patient is nervous/anxious  Objective:  Client was neatly dressed, fully oriented, made consistent eye contact and demonstrated insight        Physical Exam   Psychiatric: Her speech is normal and behavior is normal  Judgment and thought content normal  Her mood appears anxious   Cognition and memory are normal    No SI/HI or self harming behavior

## 2019-07-12 ENCOUNTER — OFFICE VISIT (OUTPATIENT)
Dept: OBGYN CLINIC | Facility: CLINIC | Age: 39
End: 2019-07-12
Payer: COMMERCIAL

## 2019-07-12 ENCOUNTER — TELEPHONE (OUTPATIENT)
Dept: FAMILY MEDICINE CLINIC | Facility: CLINIC | Age: 39
End: 2019-07-12

## 2019-07-12 VITALS
SYSTOLIC BLOOD PRESSURE: 110 MMHG | BODY MASS INDEX: 42.06 KG/M2 | DIASTOLIC BLOOD PRESSURE: 80 MMHG | HEIGHT: 67 IN | WEIGHT: 268 LBS

## 2019-07-12 DIAGNOSIS — Z01.419 ENCOUNTER FOR GYNECOLOGICAL EXAMINATION WITHOUT ABNORMAL FINDING: ICD-10-CM

## 2019-07-12 DIAGNOSIS — Z11.3 SCREEN FOR STD (SEXUALLY TRANSMITTED DISEASE): ICD-10-CM

## 2019-07-12 PROCEDURE — 87491 CHLMYD TRACH DNA AMP PROBE: CPT | Performed by: NURSE PRACTITIONER

## 2019-07-12 PROCEDURE — 87624 HPV HI-RISK TYP POOLED RSLT: CPT | Performed by: NURSE PRACTITIONER

## 2019-07-12 PROCEDURE — G0145 SCR C/V CYTO,THINLAYER,RESCR: HCPCS | Performed by: NURSE PRACTITIONER

## 2019-07-12 PROCEDURE — 87591 N.GONORRHOEAE DNA AMP PROB: CPT | Performed by: NURSE PRACTITIONER

## 2019-07-12 PROCEDURE — 99385 PREV VISIT NEW AGE 18-39: CPT | Performed by: NURSE PRACTITIONER

## 2019-07-12 RX ORDER — OMEPRAZOLE 20 MG/1
20 CAPSULE, DELAYED RELEASE ORAL DAILY
COMMUNITY
End: 2020-03-13

## 2019-07-12 NOTE — PATIENT INSTRUCTIONS
Menorrhagia   WHAT YOU NEED TO KNOW:   What is menorrhagia? Menorrhagia is heavy menstrual bleeding for more than 7 days or severe menstrual bleeding for less than 7 days  Your menstrual bleeding and cramping are so heavy that you have trouble doing your usual daily activities  Your monthly period may also occur more often, and you may bleed between periods  Menorrhagia is common in adolescence and around menopause  What causes menorrhagia? · A hormone imbalance    · Ovaries do not work correctly and cannot produce eggs    · Uterine fibroids or growths (polyps) on the lining of your uterus    · Adenomyosis (thickening of your uterus)    · A pelvic infection or intrauterine device (IUD)    · Complications of pregnancy, such as miscarriage or an ectopic pregnancy    · Conditions such as blood coagulation disorders or uterine cancer  What increases my risk for menorrhagia? · Obesity    · Not having children    · Use of blood thinner medicine    · Abnormal structure of your reproductive organs    · Family history of endometrial or colon cancer  What are the signs and symptoms of menorrhagia? · Soaking a pad or tampon every 1 to 2 hours    · Using both a pad and a tampon    · Waking up at night to change your pad or tampon    · Blood clots with your bleeding for more than 1 day    · Abdominal pain or cramps  How is menorrhagia diagnosed? Your healthcare provider will ask about your symptoms and examine you  Tell him how often you change your pad or tampon  He may examine you for other signs of bleeding, such as bruises or bleeding gums  He may ask if anything relieves your pain, such as heat or medicine  Tell your healthcare provider if you are sexually active or have ever been pregnant  You may need any of the following:  · A blood test  will check for pregnancy and the cause of your blood loss  A blood test will also show anemia caused by menorrhagia      · A pelvic exam and Pap smear  may be needed to check the size and shape of your uterus and ovaries  Your healthcare provider gently inserts a warmed speculum into your vagina  A speculum is a tool that opens your vagina to show your cervix  · A biopsy  is a procedure to remove a small piece of tissue from the endometrium  The tissue is sent to a lab for tests  · An ultrasound  may show the cause of your bleeding  Sound waves are used to show pictures on a monitor  · A hysteroscopy  is a procedure to look at your endometrium  The endometrium is the lining inside of your uterus  Your healthcare provider will insert a small tube with a camera at the end into your uterus  How is menorrhagia treated? · Medicines:      ¨ Iron supplements  may be given if your blood iron level decreases because of heavy bleeding  ¨ NSAIDs , such as ibuprofen, treat menstrual cramps  This medicine is available with or without a doctor's order  NSAIDs can cause stomach bleeding or kidney problems in certain people  If you take blood thinner medicine, always ask your healthcare provider if NSAIDs are safe for you  Always read the medicine label and follow directions  ¨ Hormones  help slow or stop your bleeding and make your monthly periods more regular  This medicine may be given as birth control pills or an intrauterine device (IUD)  · Surgery and procedures  may be needed if medicines do not work or cannot be used  You may need procedures to control bleeding, such as endometrial ablation or a D&C (dilation and curettage)  You may need uterine embolization or a hysterectomy  Ask your healthcare provider about these or other procedures you may need  How can I manage my symptoms? · Keep a supply of pads or tampons with you at all times  If possible, stay close to a bathroom  · Apply heat  on your abdomen for 20 to 30 minutes every 2 hours for as many days as directed  Heat helps decrease pain and cramps    Call 911 for any of the following:   · You have chest pain and shortness of breath  · Your heart is fluttering or beating faster than usual for you  When should I seek immediate care? · You feel dizzy when you stand  · You feel confused  · You have severe abdominal pain, nausea, and vomiting  · Your skin or the whites of your eyes turn yellow  When should I contact my healthcare provider? · You need to change your pad or tampon more than 1 time per hour, for several hours in a row  · You feel more weak and tired than usual      · You have new coldness in your hands and feet  · You have questions or concerns about your condition or care  CARE AGREEMENT:   You have the right to help plan your care  Learn about your health condition and how it may be treated  Discuss treatment options with your caregivers to decide what care you want to receive  You always have the right to refuse treatment  The above information is an  only  It is not intended as medical advice for individual conditions or treatments  Talk to your doctor, nurse or pharmacist before following any medical regimen to see if it is safe and effective for you  © 2017 [facility] Information is for End User's use only and may not be sold, redistributed or otherwise used for commercial purposes  All illustrations and images included in CareNotes® are the copyrighted property of [employer]  or [employer]

## 2019-07-12 NOTE — PROGRESS NOTES
Diagnoses and all orders for this visit:    1  Encounter for gynecological examination without abnormal finding  -     Liquid-based pap, screening  Pap collected today, discussed new guidelines  Healthy eating and nutrition, daily exercise discussed and SBE reinforced  Call with any issues and all questions and concerns addressed  Call if periods become more bothersome in the future  2  Screen for STD (sexually transmitted disease)  -     Chlamydia/GC amplified DNA by PCR  -     HIV 1/2 AG-AB combo; Future  -     Hepatitis B surface antigen; Future  -     Hepatitis C antibody; Future  -     RPR; Future    Other orders  -     omeprazole (PriLOSEC) 20 mg delayed release capsule; Take 20 mg by mouth daily      All questions and concerns answered  Patient to call with any questions  Pleasant 43 y o  nulliparous, premenopausal female here for new patient annual exam    She is having heavier menses, and about 3 months she had a "heavier period" but stated that last two menses where back to her "normal"  Reports regular cycles  Declines any hormonal birth control today  Denies history of abnormal pap smears  Last Pap 2017, negative but pt requests pap done today  Denies vaginal, urinary or breast issues, today  Denies pelvic pain  Denies any issues with her BCM, which is  Sexually active with females, without any concerns and pt requests STD testing including blood work  Denies F/C/N/V      Past Medical History:   Diagnosis Date    Arthritis     RA    Thyroid cancer ([facility] [address] )     Varicella      Past Surgical History:   Procedure Laterality Date    CHOLECYSTECTOMY      KNEE SURGERY Left     ACL repair     KNEE SURGERY      THYROIDECTOMY       Family History   Problem Relation Age of Onset    Rheum arthritis Mother     COPD Mother     Seizures Mother     No Known Problems Father     Rheum arthritis Sister     Fibromyalgia Sister     Thyroid disease Sister     Hypertension Maternal Grandmother  Lung cancer Maternal Grandmother     Heart disease Maternal Grandmother     Heart disease Maternal Grandfather     Emphysema Maternal Grandfather     Breast cancer Maternal Aunt     Colon cancer Neg Hx     Ovarian cancer Neg Hx     Cervical cancer Neg Hx     Uterine cancer Neg Hx      Social History     Tobacco Use    Smoking status: Former Smoker    Smokeless tobacco: Never Used    Tobacco comment: quit 5 years    Substance Use Topics    Alcohol use: Yes     Comment: socially    Drug use: Never       Current Outpatient Medications:     busPIRone (BUSPAR) 7 5 mg tablet, Take 1 tablet (7 5 mg total) by mouth 3 (three) times a day, Disp: 30 tablet, Rfl: 0    Cholecalciferol (VITAMIN D-3) 5000 units TABS, Take 5,000 Units by mouth, Disp: , Rfl:     docusate sodium (COLACE) 100 mg capsule, Take 100 mg by mouth, Disp: , Rfl:     folic acid (FOLVITE) 1 mg tablet, , Disp: , Rfl:     ketoconazole (NIZORAL) 2 % cream, Apply topically daily, Disp: 15 g, Rfl: 0    levothyroxine 175 mcg tablet, 1 tab on 6 days of the week with 1 5 tabs on the 7th day of the week , Disp: , Rfl:     methotrexate (RHEUMATREX) 2 5 MG tablet, Take 12 5 mg by mouth, Disp: , Rfl:     nystatin (MYCOSTATIN) powder, Apply topically 3 (three) times a day, Disp: 15 g, Rfl: 0    omeprazole (PriLOSEC) 20 mg delayed release capsule, Take 20 mg by mouth daily, Disp: , Rfl:     sertraline (ZOLOFT) 100 mg tablet, take 1 tablet by mouth once daily, Disp: 30 tablet, Rfl: 1  Patient Active Problem List    Diagnosis Date Noted    Impaired fasting glucose 05/29/2019    Fungal infection 05/28/2019    Yeast infection 05/28/2019    Sleep apnea 04/08/2019    Menorrhagia with regular cycle 04/08/2019    Post traumatic stress disorder (PTSD) 01/25/2019    Depression with anxiety 12/10/2018    Gastroesophageal reflux disease without esophagitis 03/27/2018    Morbid obesity with BMI of 40 0-44 9, adult ([facility] ) 03/27/2018    Post-surgical hypothyroidism 2018    Rheumatoid arthritis involving multiple sites ([facility] ) 2018    Thyroid cancer ([facility] ) 2018       No Known Allergies    OB History    Para Term  AB Living   0 0 0 0 0 0   SAB TAB Ectopic Multiple Live Births   0 0 0 0 0     She has a domestic partner, and works as a [employer] member  Vitals:    19 1421   BP: 110/80   BP Location: Right arm   Patient Position: Sitting   Cuff Size: Standard   Weight: 122 kg (268 lb)   Height: 5' 7" (1 702 m)     Body mass index is 41 97 kg/m²  Review of Systems   Constitutional: Negative for chills, fatigue, fever and unexpected weight change  Respiratory: Negative for shortness of breath  Gastrointestinal: Negative for anal bleeding, blood in stool, constipation and diarrhea  Genitourinary: Negative for difficulty urinating, dysuria and hematuria  OBGyn Exam    Physical Exam   Constitutional: She appears well-developed and well-nourished  No distress  Head: Normocephalic  Neck: Normal range of motion  Neck supple  Pulmonary: Effort normal   Breasts: bilateral without masses, skin changes or nipple discharge  Bilaterally soft and warm to touch  No areas of erythema or pain  Abdominal: Soft  Non-tender, Obese  Pelvic exam was performed with patient supine  No labial fusion  There is no rash, tenderness, lesion or injury on the right labia  There is no rash, tenderness, lesion or injury on the left labia  Uterus is not deviated, not enlarged, not fixed and not tender  Cervix exhibits no motion tenderness, no discharge and no friability  Right adnexum displays no mass, no tenderness and no fullness  Left adnexum displays no mass, no tenderness and no fullness  No erythema or tenderness in the vagina  No foreign body in the vagina  No signs of injury around the vagina  No vaginal discharge found  PAP/STD collected w/o difficulty  Lymphadenopathy:        Right: No inguinal adenopathy present  Left: No inguinal adenopathy present

## 2019-07-15 ENCOUNTER — HOSPITAL ENCOUNTER (OUTPATIENT)
Dept: SLEEP CENTER | Facility: CLINIC | Age: 39
Discharge: HOME | End: 2019-07-15
Payer: COMMERCIAL

## 2019-07-15 DIAGNOSIS — G47.33 OSA (OBSTRUCTIVE SLEEP APNEA): ICD-10-CM

## 2019-07-15 LAB
C TRACH DNA SPEC QL NAA+PROBE: NEGATIVE
HPV HR 12 DNA CVX QL NAA+PROBE: NEGATIVE
HPV16 DNA CVX QL NAA+PROBE: NEGATIVE
HPV18 DNA CVX QL NAA+PROBE: NEGATIVE
N GONORRHOEA DNA SPEC QL NAA+PROBE: NEGATIVE

## 2019-07-15 PROCEDURE — G0399 HOME SLEEP TEST/TYPE 3 PORTA: HCPCS

## 2019-07-15 PROCEDURE — 95806 SLEEP STUDY UNATT&RESP EFFT: CPT | Performed by: INTERNAL MEDICINE

## 2019-07-17 LAB
LAB AP GYN PRIMARY INTERPRETATION: NORMAL
Lab: NORMAL

## 2019-07-18 DIAGNOSIS — G47.33 OSA (OBSTRUCTIVE SLEEP APNEA): Primary | ICD-10-CM

## 2019-07-19 ENCOUNTER — TELEPHONE (OUTPATIENT)
Dept: PULMONOLOGY | Facility: CLINIC | Age: 39
End: 2019-07-19

## 2019-07-19 ENCOUNTER — TELEPHONE (OUTPATIENT)
Dept: GASTROENTEROLOGY | Facility: CLINIC | Age: 39
End: 2019-07-19

## 2019-07-25 ENCOUNTER — ANESTHESIA (OUTPATIENT)
Dept: GASTROENTEROLOGY | Facility: HOSPITAL | Age: 39
End: 2019-07-25

## 2019-07-25 ENCOUNTER — TREATMENT (OUTPATIENT)
Dept: GASTROENTEROLOGY | Facility: CLINIC | Age: 39
End: 2019-07-25

## 2019-07-25 ENCOUNTER — HOSPITAL ENCOUNTER (OUTPATIENT)
Dept: GASTROENTEROLOGY | Facility: HOSPITAL | Age: 39
Setting detail: OUTPATIENT SURGERY
Discharge: HOME | End: 2019-07-25
Attending: INTERNAL MEDICINE | Admitting: INTERNAL MEDICINE
Payer: COMMERCIAL

## 2019-07-25 ENCOUNTER — ANESTHESIA EVENT (OUTPATIENT)
Dept: GASTROENTEROLOGY | Facility: HOSPITAL | Age: 39
End: 2019-07-25

## 2019-07-25 VITALS
HEART RATE: 75 BPM | DIASTOLIC BLOOD PRESSURE: 71 MMHG | BODY MASS INDEX: 41.42 KG/M2 | SYSTOLIC BLOOD PRESSURE: 121 MMHG | RESPIRATION RATE: 21 BRPM | HEIGHT: 67 IN | WEIGHT: 263.89 LBS | OXYGEN SATURATION: 97 % | TEMPERATURE: 97 F

## 2019-07-25 DIAGNOSIS — R10.13 DYSPEPSIA: Primary | ICD-10-CM

## 2019-07-25 DIAGNOSIS — R10.32 LEFT LOWER QUADRANT PAIN: ICD-10-CM

## 2019-07-25 LAB
EXT PREGNANCY TEST URINE: NEGATIVE
EXT. CONTROL: NORMAL

## 2019-07-25 PROCEDURE — 88305 TISSUE EXAM BY PATHOLOGIST: CPT | Performed by: PATHOLOGY

## 2019-07-25 PROCEDURE — 43239 EGD BIOPSY SINGLE/MULTIPLE: CPT | Performed by: INTERNAL MEDICINE

## 2019-07-25 PROCEDURE — 45380 COLONOSCOPY AND BIOPSY: CPT | Performed by: INTERNAL MEDICINE

## 2019-07-25 PROCEDURE — 81025 URINE PREGNANCY TEST: CPT | Performed by: ANESTHESIOLOGY

## 2019-07-25 RX ORDER — PROPOFOL 10 MG/ML
INJECTION, EMULSION INTRAVENOUS AS NEEDED
Status: DISCONTINUED | OUTPATIENT
Start: 2019-07-25 | End: 2019-07-25 | Stop reason: SURG

## 2019-07-25 RX ORDER — LIDOCAINE HYDROCHLORIDE 10 MG/ML
INJECTION, SOLUTION EPIDURAL; INFILTRATION; INTRACAUDAL; PERINEURAL AS NEEDED
Status: DISCONTINUED | OUTPATIENT
Start: 2019-07-25 | End: 2019-07-25 | Stop reason: SURG

## 2019-07-25 RX ORDER — SODIUM CHLORIDE, SODIUM LACTATE, POTASSIUM CHLORIDE, CALCIUM CHLORIDE 600; 310; 30; 20 MG/100ML; MG/100ML; MG/100ML; MG/100ML
INJECTION, SOLUTION INTRAVENOUS CONTINUOUS PRN
Status: DISCONTINUED | OUTPATIENT
Start: 2019-07-25 | End: 2019-07-25 | Stop reason: SURG

## 2019-07-25 RX ORDER — PANTOPRAZOLE SODIUM 40 MG/1
40 TABLET, DELAYED RELEASE ORAL DAILY
Qty: 31 TABLET | Refills: 6 | Status: SHIPPED | OUTPATIENT
Start: 2019-07-25 | End: 2020-03-13 | Stop reason: SDUPTHER

## 2019-07-25 RX ADMIN — PROPOFOL 100 MG: 10 INJECTION, EMULSION INTRAVENOUS at 14:02

## 2019-07-25 RX ADMIN — PROPOFOL 100 MG: 10 INJECTION, EMULSION INTRAVENOUS at 14:05

## 2019-07-25 RX ADMIN — PROPOFOL 100 MG: 10 INJECTION, EMULSION INTRAVENOUS at 14:11

## 2019-07-25 RX ADMIN — SODIUM CHLORIDE, SODIUM LACTATE, POTASSIUM CHLORIDE, AND CALCIUM CHLORIDE: .6; .31; .03; .02 INJECTION, SOLUTION INTRAVENOUS at 13:37

## 2019-07-25 RX ADMIN — PROPOFOL 20 MG: 10 INJECTION, EMULSION INTRAVENOUS at 14:18

## 2019-07-25 RX ADMIN — LIDOCAINE HYDROCHLORIDE 50 MG: 10 INJECTION, SOLUTION EPIDURAL; INFILTRATION; INTRACAUDAL; PERINEURAL at 14:02

## 2019-07-25 NOTE — ANESTHESIA PREPROCEDURE EVALUATION
Review of Systems/Medical History  Patient summary reviewed  Chart reviewed      Cardiovascular   Pulmonary  Sleep apnea Sleep Study completed,        GI/Hepatic    GERD ,        Negative  ROS        Endo/Other  History of thyroid disease , hypothyroidism,   Comment: Thyroid Ca Obesity  morbid obesity   GYN       Hematology  Negative hematology ROS      Musculoskeletal  Rheumatoid arthritis ,   Comment: Rheumatoid arthritis Arthritis     Neurology  Negative neurology ROS      Psychology   Anxiety,              Physical Exam    Airway    Mallampati score: II  TM Distance: >3 FB       Dental   Comment: Poor dentition  Multiple missing, chipped teeth  Evident decay throughout mout,     Cardiovascular  Rhythm: regular, Rate: normal,     Pulmonary  Breath sounds clear to auscultation,     Other Findings        Anesthesia Plan  ASA Score- 3     Anesthesia Type- IV sedation with anesthesia with ASA Monitors  Additional Monitors:   Airway Plan:         Plan Factors-Patient not instructed to abstain from smoking on day of procedure  Patient did not smoke on day of surgery  Induction- intravenous  Postoperative Plan-     Informed Consent- Anesthetic plan and risks discussed with patient  I personally reviewed this patient with the CRNA  Discussed and agreed on the Anesthesia Plan with the CRNA  Lurdes Aguila

## 2019-07-25 NOTE — ANESTHESIA POSTPROCEDURE EVALUATION
Post-Op Assessment Note    CV Status:  Stable  Pain Score: 0    Pain management: adequate     Mental Status:  Sleepy   Hydration Status:  Stable   PONV Controlled:  None   Airway Patency:  Patent   Post Op Vitals Reviewed: Yes      Staff: Anesthesiologist, CRNA           /58 (07/25/19 1423)    Temp      Pulse 65 (07/25/19 1423)   Resp 16 (07/25/19 1423)    SpO2 100 % (07/25/19 1423)

## 2019-07-25 NOTE — DISCHARGE INSTRUCTIONS

## 2019-07-25 NOTE — H&P
History and Physical -  [facility]  Patti Rosario 43 y o  female MRN: [id number]      HPI: Patti Rosario is a 43y o  year old female who presents for abdominal pain, acid reflux, rectal bleeding      REVIEW OF SYSTEMS: Per the HPI, and otherwise unremarkable  Historical Information   Past Medical History:   Diagnosis Date    Arthritis     RA    Colon polyp     Sleep apnea     Thyroid cancer ([address] )     Varicella      Past Surgical History:   Procedure Laterality Date    CHOLECYSTECTOMY      COLONOSCOPY      KNEE SURGERY Left     ACL repair     KNEE SURGERY      SKIN GRAFT Right     thumb    THYROIDECTOMY       Social History   Social History     Substance and Sexual Activity   Alcohol Use Yes    Frequency: 2-4 times a month    Comment: socially     Social History     Substance and Sexual Activity   Drug Use Never     Social History     Tobacco Use   Smoking Status Former Smoker    Last attempt to quit: 2015    Years since quittin 0   Smokeless Tobacco Never Used   Tobacco Comment    quit 5 years      Family History   Problem Relation Age of Onset    Rheum arthritis Mother     COPD Mother     Seizures Mother     No Known Problems Father     Rheum arthritis Sister     Fibromyalgia Sister     Thyroid disease Sister     Hypertension Maternal Grandmother     Lung cancer Maternal Grandmother     Heart disease Maternal Grandmother     Heart disease Maternal Grandfather     Emphysema Maternal Grandfather     Breast cancer Maternal Aunt     Colon cancer Neg Hx     Ovarian cancer Neg Hx     Cervical cancer Neg Hx     Uterine cancer Neg Hx        Meds/Allergies       (Not in a hospital admission)    No Known Allergies    Objective     Blood pressure 114/62, pulse 80, temperature 98 9 °F (37 2 °C), temperature source Oral, resp  rate 12, height 5' 7" (1 702 m), weight 120 kg (263 lb 14 3 oz), last menstrual period 2019, SpO2 96 %        PHYSICAL EXAM    Gen: NAD  CV: RRR  CHEST: Clear  ABD: soft, NT/ND  EXT: no edema      ASSESSMENT/PLAN:  This is a 43y o  year old female here for esophagogastroduodenoscopy with biopsy, colonoscopy, and she is stable and optimized for her procedure

## 2019-07-26 ENCOUNTER — SOCIAL WORK (OUTPATIENT)
Dept: BEHAVIORAL/MENTAL HEALTH CLINIC | Facility: CLINIC | Age: 39
End: 2019-07-26
Payer: COMMERCIAL

## 2019-07-26 DIAGNOSIS — F41.1 GENERALIZED ANXIETY DISORDER: Primary | ICD-10-CM

## 2019-07-26 PROCEDURE — 90834 PSYTX W PT 45 MINUTES: CPT | Performed by: SOCIAL WORKER

## 2019-07-26 NOTE — PSYCH
Time In 11 am Time Out 11:45 am Session length 45 minutes  Assessment/Plan:   Client continues to make progress, she is now participating in couples counseling, and working on taking care of physical health appointments  Diagnoses and all orders for this visit:    Generalized anxiety disorder          Subjective: Client reported having emotional reaction following having sex with her partner  She has realized she is having more anxiety overall with respect to this activity  Patient ID: Dahiana Salcedo is a 43 y o  female  Client reported that she and her partner had sex, and realized she is getting anxious in the lead up to and felt emotionally overwhelmed following it, causing her partner to feel overwhelmed by her response  Session focused on processing what is occurring for her when becoming physically intimate  We discussed that sex has always been about power, control and pain for her, and until now she has never been physically present while having sex  We discussed being able to talk with Scotty Taylor about this, allowing her to understand it is not her, and that she needs to engage more in healthy sex, to begin to allow herself to tolerate sex that is pleasurable, mutually respectful, and fun  Client agreed, that she thinks it is more about feeling overwhelmed in the moment as she is now present  We also discussed the physical intimacy is not just sex but being physically vulnerable with her partner which she will need to practice being mindful in this process  We also discussed couples counseling, and talking to gary about physical intamacy  She will continue biweekly  Review of Systems   Psychiatric/Behavioral: The patient is nervous/anxious  Objective: Client was fully oriented, made consistent eye contact and demonstrated insight  Client fully participated in session  Physical Exam   Psychiatric: She has a normal mood and affect   Her speech is normal and behavior is normal  Judgment and thought content normal  Cognition and memory are normal    No SI/HI or self harming behavior

## 2019-08-01 ENCOUNTER — TELEPHONE (OUTPATIENT)
Dept: PULMONOLOGY | Facility: CLINIC | Age: 39
End: 2019-08-01

## 2019-08-09 ENCOUNTER — SOCIAL WORK (OUTPATIENT)
Dept: BEHAVIORAL/MENTAL HEALTH CLINIC | Facility: CLINIC | Age: 39
End: 2019-08-09
Payer: COMMERCIAL

## 2019-08-09 DIAGNOSIS — F41.1 GENERALIZED ANXIETY DISORDER: Primary | ICD-10-CM

## 2019-08-09 PROCEDURE — 90834 PSYTX W PT 45 MINUTES: CPT | Performed by: SOCIAL WORKER

## 2019-08-09 NOTE — PSYCH
Time In 11 am Time Out 11:50 am session length 50 minutes  Assessment/Plan:   Client continues to make strides in communicating her needs and addressing physical issues to improve quality of life  Diagnoses and all orders for this visit:    Generalized anxiety disorder          Subjective: Client reported that she is feeling calmer, less anxious and sees how her partner is trying within their relationship  Patient ID: Zoe Dorsey is a 43 y o  female  Client reported she was able to follow through with the dentist office though it was emotionally charged for her  She also reported that it will be expensive and will be following through with a second opinion to find a place that will allow her to make payments  She feels proud she was able to follow it through and will  Continue to follow it through  Session focused on her frustration with couples counseling and the need for a new therapist  We discussed the couples issues of Ann-Marie's children, her guilt in parenting and how that impacts their relationship  Client stated she is ready to begin to address this with Mckenzie Lancaster and has seen Ann-Marie change her tactic however she is unclear if she will follow this through  Client expressed concern regarding it going back to the way it was  She and Mckenzie Lancaster agree that they need to change couple's therapist  They are looking for a recommendation from Ann-Marie's therapist who is in the area  Client will now begin to focus on what is next for her with respect to her career, and how she wants to grow individually  She will continue biweekly  Review of Systems   Psychiatric/Behavioral: The patient is nervous/anxious (mild)  Objective: Client was neatly dressed, fully oriented, made consistent eye contact and demonstrated insight  She easily engaged in session  Physical Exam   Psychiatric: She has a normal mood and affect   Her speech is normal and behavior is normal  Judgment and thought content normal  Cognition and memory are normal    No SI/HI or self harming behavior

## 2019-08-23 ENCOUNTER — SOCIAL WORK (OUTPATIENT)
Dept: BEHAVIORAL/MENTAL HEALTH CLINIC | Facility: CLINIC | Age: 39
End: 2019-08-23
Payer: COMMERCIAL

## 2019-08-23 DIAGNOSIS — F41.1 GENERALIZED ANXIETY DISORDER: Primary | ICD-10-CM

## 2019-08-23 PROCEDURE — 90834 PSYTX W PT 45 MINUTES: CPT | Performed by: SOCIAL WORKER

## 2019-08-23 NOTE — PSYCH
Time In 11 am Time out 11:45 am Session length 45 minutes  Assessment/Plan:   Client continues to be coping well, minimal anxiety and beginning to plan for future  Diagnoses and all orders for this visit:    Generalized anxiety disorder          Subjective: Client discussed being overwhelmed with work, leading her to realize she needs to begin to plan for her future and move ahead  Patient ID: Joanne Olguin is a 43 y o  female  Client reported due to her work schedule she has been unable to follow up with a new dentist, and has not had much time for her home life  Session focused that most of her work is completed, 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 and [employer] about moving forward with a social work degree  Client will have this done prior to next session  We are looking at discharging in the next month  Review of Systems   Psychiatric/Behavioral: The patient is nervous/anxious (mild)  Objective:  Client was neatly dressed, fully oriented, made consistent eye contact and demonstrated insight  Physical Exam   Psychiatric: She has a normal mood and affect  Her speech is normal and behavior is normal  Judgment and thought content normal  Cognition and memory are normal    No SI/ HI or self harming behavior reported

## 2019-09-03 DIAGNOSIS — F41.8 DEPRESSION WITH ANXIETY: ICD-10-CM

## 2019-09-04 RX ORDER — SERTRALINE HYDROCHLORIDE 100 MG/1
TABLET, FILM COATED ORAL
Qty: 30 TABLET | Refills: 1 | Status: SHIPPED | OUTPATIENT
Start: 2019-09-04 | End: 2020-08-20 | Stop reason: ALTCHOICE

## 2019-10-15 ENCOUNTER — SOCIAL WORK (OUTPATIENT)
Dept: BEHAVIORAL/MENTAL HEALTH CLINIC | Facility: CLINIC | Age: 39
End: 2019-10-15
Payer: COMMERCIAL

## 2019-10-15 DIAGNOSIS — F41.1 GENERALIZED ANXIETY DISORDER: Primary | ICD-10-CM

## 2019-10-15 PROCEDURE — 90832 PSYTX W PT 30 MINUTES: CPT | Performed by: SOCIAL WORKER

## 2019-10-15 NOTE — PSYCH
Time In 4:05, Time Out 4:40 session length 35 minutes  Assessment/Plan:   Yasmin Ramos is doing well overall  Client will be attending school and leaving her job for something that fits her more  Diagnoses and all orders for this visit:    Generalized anxiety disorder          Subjective: Yasmin Ramos reported she has been feeling good overall  She has not struggled with any anxiety  Patient ID: John Oneil is a 43 y o  female  Yasmin Ramos reported she feels she has completed all that she needed to in treatment  We reviewed all her work, and how far she has come  She shared her plans for the future  She was encouraged to keep this provider posted on her continued progress  Review of Systems   Psychiatric/Behavioral: The patient is nervous/anxious (mild work related)  Objective: Yasmin Ramos was casually dressed, fully oriented, made consistent eye contact and demonstrated insight  Physical Exam   Psychiatric: She has a normal mood and affect  Her speech is normal and behavior is normal  Judgment and thought content normal  Cognition and memory are normal    No SI/HI or self harming behavior reported

## 2019-11-08 ENCOUNTER — APPOINTMENT (OUTPATIENT)
Dept: LAB | Facility: HOSPITAL | Age: 39
End: 2019-11-08
Payer: COMMERCIAL

## 2019-11-08 ENCOUNTER — TRANSCRIBE ORDERS (OUTPATIENT)
Dept: ADMINISTRATIVE | Facility: HOSPITAL | Age: 39
End: 2019-11-08

## 2019-11-08 DIAGNOSIS — Z79.899 ENCOUNTER FOR LONG-TERM (CURRENT) USE OF OTHER MEDICATIONS: Primary | ICD-10-CM

## 2019-11-08 DIAGNOSIS — Z79.899 ENCOUNTER FOR LONG-TERM (CURRENT) USE OF OTHER MEDICATIONS: ICD-10-CM

## 2019-11-08 LAB
ALBUMIN SERPL BCP-MCNC: 3.5 G/DL (ref 3.5–5)
ALP SERPL-CCNC: 44 U/L (ref 46–116)
ALT SERPL W P-5'-P-CCNC: 43 U/L (ref 12–78)
ANION GAP SERPL CALCULATED.3IONS-SCNC: 8 MMOL/L (ref 4–13)
AST SERPL W P-5'-P-CCNC: 21 U/L (ref 5–45)
BASOPHILS # BLD AUTO: 0.04 THOUSANDS/ΜL (ref 0–0.1)
BASOPHILS NFR BLD AUTO: 1 % (ref 0–1)
BILIRUB SERPL-MCNC: 0.4 MG/DL (ref 0.2–1)
BUN SERPL-MCNC: 13 MG/DL (ref 5–25)
CALCIUM SERPL-MCNC: 8.1 MG/DL (ref 8.3–10.1)
CHLORIDE SERPL-SCNC: 104 MMOL/L (ref 100–108)
CO2 SERPL-SCNC: 29 MMOL/L (ref 21–32)
CREAT SERPL-MCNC: 0.82 MG/DL (ref 0.6–1.3)
EOSINOPHIL # BLD AUTO: 0.12 THOUSAND/ΜL (ref 0–0.61)
EOSINOPHIL NFR BLD AUTO: 2 % (ref 0–6)
ERYTHROCYTE [DISTWIDTH] IN BLOOD BY AUTOMATED COUNT: 13.2 % (ref 11.6–15.1)
ERYTHROCYTE [SEDIMENTATION RATE] IN BLOOD: 15 MM/HOUR (ref 0–20)
GFR SERPL CREATININE-BSD FRML MDRD: 90 ML/MIN/1.73SQ M
GLUCOSE P FAST SERPL-MCNC: 110 MG/DL (ref 65–99)
HCT VFR BLD AUTO: 41.5 % (ref 34.8–46.1)
HGB BLD-MCNC: 13.5 G/DL (ref 11.5–15.4)
IMM GRANULOCYTES # BLD AUTO: 0.01 THOUSAND/UL (ref 0–0.2)
IMM GRANULOCYTES NFR BLD AUTO: 0 % (ref 0–2)
LYMPHOCYTES # BLD AUTO: 1.72 THOUSANDS/ΜL (ref 0.6–4.47)
LYMPHOCYTES NFR BLD AUTO: 29 % (ref 14–44)
MCH RBC QN AUTO: 29.2 PG (ref 26.8–34.3)
MCHC RBC AUTO-ENTMCNC: 32.5 G/DL (ref 31.4–37.4)
MCV RBC AUTO: 90 FL (ref 82–98)
MONOCYTES # BLD AUTO: 0.44 THOUSAND/ΜL (ref 0.17–1.22)
MONOCYTES NFR BLD AUTO: 7 % (ref 4–12)
NEUTROPHILS # BLD AUTO: 3.71 THOUSANDS/ΜL (ref 1.85–7.62)
NEUTS SEG NFR BLD AUTO: 61 % (ref 43–75)
NRBC BLD AUTO-RTO: 0 /100 WBCS
PLATELET # BLD AUTO: 342 THOUSANDS/UL (ref 149–390)
PMV BLD AUTO: 9.7 FL (ref 8.9–12.7)
POTASSIUM SERPL-SCNC: 3.7 MMOL/L (ref 3.5–5.3)
PROT SERPL-MCNC: 6.8 G/DL (ref 6.4–8.2)
RBC # BLD AUTO: 4.63 MILLION/UL (ref 3.81–5.12)
SODIUM SERPL-SCNC: 141 MMOL/L (ref 136–145)
WBC # BLD AUTO: 6.04 THOUSAND/UL (ref 4.31–10.16)

## 2019-11-08 PROCEDURE — 36415 COLL VENOUS BLD VENIPUNCTURE: CPT

## 2019-11-08 PROCEDURE — 80053 COMPREHEN METABOLIC PANEL: CPT

## 2019-11-08 PROCEDURE — 85025 COMPLETE CBC W/AUTO DIFF WBC: CPT

## 2019-11-08 PROCEDURE — 85652 RBC SED RATE AUTOMATED: CPT

## 2020-01-24 ENCOUNTER — OFFICE VISIT (OUTPATIENT)
Dept: PULMONOLOGY | Facility: CLINIC | Age: 40
End: 2020-01-24
Payer: COMMERCIAL

## 2020-01-24 VITALS
DIASTOLIC BLOOD PRESSURE: 84 MMHG | OXYGEN SATURATION: 97 % | SYSTOLIC BLOOD PRESSURE: 120 MMHG | HEIGHT: 67 IN | WEIGHT: 280 LBS | BODY MASS INDEX: 43.95 KG/M2 | HEART RATE: 98 BPM

## 2020-01-24 DIAGNOSIS — G47.33 OSA (OBSTRUCTIVE SLEEP APNEA): Primary | ICD-10-CM

## 2020-01-24 DIAGNOSIS — E66.01 MORBID OBESITY WITH BMI OF 40.0-44.9, ADULT (HCC): ICD-10-CM

## 2020-01-24 PROCEDURE — 99213 OFFICE O/P EST LOW 20 MIN: CPT | Performed by: PHYSICIAN ASSISTANT

## 2020-01-24 NOTE — PROGRESS NOTES
Assessment/Plan:   Diagnoses and all orders for this visit:    NEO (obstructive sleep apnea)    Morbid obesity with BMI of 40 0-44 9, adult [facility])     Patient is here today for follow-up on her CPAP   She was recently started on a CPAP for her mild sleep apnea   Compliance shows she has been using it greater than 4 hours for 70% of the nights  She had been having trouble adjusting to the CPAP, finds it is becoming easier as time goes on  On the nights that she does use it for more hours she feels that she gets a better night of rest and feels more well rested during the day   Her residual AHI is 1 2   She is benefitting from the use of the CPAP , will continue with the current settings  She can follow-up with us on an annual basis or sooner if necessary , will receive her supplies every 3-6 months  Return in about 1 year (around 1/24/2021)  All questions are answered to the patient's satisfaction and understanding  She verbalizes understanding  She is encouraged to call with any further questions or concerns  Portions of the record may have been created with voice recognition software  Occasional wrong word or "sound a like" substitutions may have occurred due to the inherent limitations of voice recognition software  Read the chart carefully and recognize, using context, where substitutions have occurred  Electronically Signed by Harlie Jeans, PA-C    ______________________________________________________________________    Chief Complaint:   Chief Complaint   Patient presents with    Follow-up       Patient ID: Chantell Rosario is a 35 y o  y o  female has a past medical history of Arthritis, Colon polyp, Sleep apnea, Thyroid cancer ([address] ), and Varicella  1/24/2020  Patient presents today for follow-up visit  Patient is a 71-year-old female with past medical history of thyroid cancer, RA, recently diagnosed with mild sleep apnea    She was started on a CPAP and is here today for follow-up  She is slowly adjusting to using the CPAP at night, has been getting easier as time goes on  Some nights she takes it off after few hours another nights is able to sleep through the night with the CPAP on  On the nights that she sleeps with it for longer hours she does note that she gets a better night of rest and feels more well rested during the day  Review of Systems   Constitutional: Negative  HENT: Negative  Respiratory: Negative  Cardiovascular: Negative  Gastrointestinal: Negative  Genitourinary: Negative  Musculoskeletal: Negative  Skin: Negative  Allergic/Immunologic: Negative  Neurological: Negative  Psychiatric/Behavioral: Negative  Smoking history: She reports that she quit smoking about 4 years ago   She has never used smokeless tobacco     The following portions of the patient's history were reviewed and updated as appropriate: allergies, current medications, past family history, past medical history, past social history, past surgical history and problem list     Immunization History   Administered Date(s) Administered    Influenza, recombinant, quadrivalent,injectable, preservative free 12/10/2018    Tdap 12/10/2018     Current Outpatient Medications   Medication Sig Dispense Refill    busPIRone (BUSPAR) 7 5 mg tablet Take 1 tablet (7 5 mg total) by mouth 3 (three) times a day 30 tablet 0    Cholecalciferol (VITAMIN D-3) 5000 units TABS Take 5,000 Units by mouth      docusate sodium (COLACE) 100 mg capsule Take 100 mg by mouth      folic acid (FOLVITE) 1 mg tablet       ketoconazole (NIZORAL) 2 % cream Apply topically daily 15 g 0    levothyroxine 175 mcg tablet 200 mcg       methotrexate (RHEUMATREX) 2 5 MG tablet Take 12 5 mg by mouth      nystatin (MYCOSTATIN) powder Apply topically 3 (three) times a day 15 g 0    omeprazole (PriLOSEC) 20 mg delayed release capsule Take 20 mg by mouth daily      pantoprazole (PROTONIX) 40 mg tablet Take 1 tablet (40 mg total) by mouth daily 31 tablet 6    sertraline (ZOLOFT) 100 mg tablet TAKE 1 TABLET BY MOUTH EVERY DAY 30 tablet 1     No current facility-administered medications for this visit  Allergies: Patient has no known allergies  Objective:  Vitals:    01/24/20 1427   BP: 120/84   Pulse: 98   SpO2: 97%   Weight: 127 kg (280 lb)   Height: 5' 7" (1 702 m)   Oxygen Therapy  SpO2: 97 %    Wt Readings from Last 3 Encounters:   01/24/20 127 kg (280 lb)   07/25/19 120 kg (263 lb 14 3 oz)   07/12/19 122 kg (268 lb)     Body mass index is 43 85 kg/m²  Physical Exam   Constitutional: She is oriented to person, place, and time  She appears well-developed and well-nourished  No distress  HENT:   Mouth/Throat: Oropharynx is clear and moist    Crowded oropharyngeal airway   Eyes: Pupils are equal, round, and reactive to light  Neck:   Short and wide neck   Cardiovascular: Normal rate, regular rhythm and normal heart sounds  No murmur heard  Pulmonary/Chest: Effort normal and breath sounds normal  No accessory muscle usage  No respiratory distress  She has no decreased breath sounds  She has no wheezes  She has no rhonchi  She has no rales  Abdominal: Soft  There is no tenderness  Musculoskeletal: Normal range of motion  Neurological: She is alert and oriented to person, place, and time  Skin: Skin is warm and dry  No rash noted  Psychiatric: She has a normal mood and affect  Lab Review:   Lab Results   Component Value Date    K 3 7 11/08/2019     11/08/2019    CO2 29 11/08/2019    BUN 13 11/08/2019    CREATININE 0 82 11/08/2019    CALCIUM 8 1 (L) 11/08/2019     Lab Results   Component Value Date    WBC 6 04 11/08/2019    HGB 13 5 11/08/2019    HCT 41 5 11/08/2019    MCV 90 11/08/2019     11/08/2019       Diagnostics:  I have personally reviewed pertinent reports  Reviewed CPAP compliance  Office Spirometry Results:     ESS:    No results found

## 2020-03-13 ENCOUNTER — OFFICE VISIT (OUTPATIENT)
Dept: GASTROENTEROLOGY | Facility: CLINIC | Age: 40
End: 2020-03-13
Payer: COMMERCIAL

## 2020-03-13 ENCOUNTER — TRANSCRIBE ORDERS (OUTPATIENT)
Dept: ADMINISTRATIVE | Facility: HOSPITAL | Age: 40
End: 2020-03-13

## 2020-03-13 ENCOUNTER — APPOINTMENT (OUTPATIENT)
Dept: LAB | Facility: HOSPITAL | Age: 40
End: 2020-03-13
Payer: COMMERCIAL

## 2020-03-13 VITALS
DIASTOLIC BLOOD PRESSURE: 80 MMHG | WEIGHT: 277.6 LBS | SYSTOLIC BLOOD PRESSURE: 112 MMHG | HEIGHT: 67 IN | HEART RATE: 87 BPM | BODY MASS INDEX: 43.57 KG/M2

## 2020-03-13 DIAGNOSIS — M05.79 SEROPOSITIVE RHEUMATOID ARTHRITIS OF MULTIPLE SITES (HCC): ICD-10-CM

## 2020-03-13 DIAGNOSIS — Z79.899 ENCOUNTER FOR LONG-TERM (CURRENT) USE OF OTHER MEDICATIONS: ICD-10-CM

## 2020-03-13 DIAGNOSIS — M05.79 SEROPOSITIVE RHEUMATOID ARTHRITIS OF MULTIPLE SITES (HCC): Primary | ICD-10-CM

## 2020-03-13 DIAGNOSIS — R10.32 LEFT LOWER QUADRANT PAIN: Primary | ICD-10-CM

## 2020-03-13 DIAGNOSIS — K58.0 IRRITABLE BOWEL SYNDROME WITH DIARRHEA: ICD-10-CM

## 2020-03-13 DIAGNOSIS — R10.13 DYSPEPSIA: ICD-10-CM

## 2020-03-13 LAB
ALBUMIN SERPL BCP-MCNC: 3.5 G/DL (ref 3.5–5)
ALP SERPL-CCNC: 45 U/L (ref 46–116)
ALT SERPL W P-5'-P-CCNC: 41 U/L (ref 12–78)
ANION GAP SERPL CALCULATED.3IONS-SCNC: 6 MMOL/L (ref 4–13)
AST SERPL W P-5'-P-CCNC: 24 U/L (ref 5–45)
BASOPHILS # BLD MANUAL: 0.07 THOUSAND/UL (ref 0–0.1)
BASOPHILS NFR MAR MANUAL: 1 % (ref 0–1)
BILIRUB SERPL-MCNC: 0.5 MG/DL (ref 0.2–1)
BUN SERPL-MCNC: 11 MG/DL (ref 5–25)
CALCIUM SERPL-MCNC: 8.4 MG/DL (ref 8.3–10.1)
CHLORIDE SERPL-SCNC: 104 MMOL/L (ref 100–108)
CO2 SERPL-SCNC: 29 MMOL/L (ref 21–32)
CREAT SERPL-MCNC: 0.97 MG/DL (ref 0.6–1.3)
CRP SERPL QL: 6.4 MG/L
EOSINOPHIL # BLD MANUAL: 0.07 THOUSAND/UL (ref 0–0.4)
EOSINOPHIL NFR BLD MANUAL: 1 % (ref 0–6)
ERYTHROCYTE [DISTWIDTH] IN BLOOD BY AUTOMATED COUNT: 13 % (ref 11.6–15.1)
ERYTHROCYTE [SEDIMENTATION RATE] IN BLOOD: 11 MM/HOUR (ref 0–20)
GFR SERPL CREATININE-BSD FRML MDRD: 73 ML/MIN/1.73SQ M
GIANT PLATELETS BLD QL SMEAR: PRESENT
GLUCOSE P FAST SERPL-MCNC: 92 MG/DL (ref 65–99)
HCT VFR BLD AUTO: 44.4 % (ref 34.8–46.1)
HGB BLD-MCNC: 14.1 G/DL (ref 11.5–15.4)
LG PLATELETS BLD QL SMEAR: PRESENT
LYMPHOCYTES # BLD AUTO: 1.56 THOUSAND/UL (ref 0.6–4.47)
LYMPHOCYTES # BLD AUTO: 23 % (ref 14–44)
MCH RBC QN AUTO: 28.5 PG (ref 26.8–34.3)
MCHC RBC AUTO-ENTMCNC: 31.8 G/DL (ref 31.4–37.4)
MCV RBC AUTO: 90 FL (ref 82–98)
MONOCYTES # BLD AUTO: 0.2 THOUSAND/UL (ref 0–1.22)
MONOCYTES NFR BLD: 3 % (ref 4–12)
NEUTROPHILS # BLD MANUAL: 4.35 THOUSAND/UL (ref 1.85–7.62)
NEUTS SEG NFR BLD AUTO: 64 % (ref 43–75)
NRBC BLD AUTO-RTO: 0 /100 WBCS
PLATELET # BLD AUTO: 399 THOUSANDS/UL (ref 149–390)
PLATELET BLD QL SMEAR: ADEQUATE
PMV BLD AUTO: 9.6 FL (ref 8.9–12.7)
POTASSIUM SERPL-SCNC: 4.3 MMOL/L (ref 3.5–5.3)
PROT SERPL-MCNC: 7.1 G/DL (ref 6.4–8.2)
RBC # BLD AUTO: 4.95 MILLION/UL (ref 3.81–5.12)
SODIUM SERPL-SCNC: 139 MMOL/L (ref 136–145)
TOTAL CELLS COUNTED SPEC: 100
VARIANT LYMPHS # BLD AUTO: 8 %
WBC # BLD AUTO: 6.8 THOUSAND/UL (ref 4.31–10.16)

## 2020-03-13 PROCEDURE — 36415 COLL VENOUS BLD VENIPUNCTURE: CPT

## 2020-03-13 PROCEDURE — 80053 COMPREHEN METABOLIC PANEL: CPT

## 2020-03-13 PROCEDURE — 3008F BODY MASS INDEX DOCD: CPT | Performed by: PHYSICIAN ASSISTANT

## 2020-03-13 PROCEDURE — 1036F TOBACCO NON-USER: CPT | Performed by: PHYSICIAN ASSISTANT

## 2020-03-13 PROCEDURE — 99213 OFFICE O/P EST LOW 20 MIN: CPT | Performed by: PHYSICIAN ASSISTANT

## 2020-03-13 PROCEDURE — 85007 BL SMEAR W/DIFF WBC COUNT: CPT

## 2020-03-13 PROCEDURE — 85027 COMPLETE CBC AUTOMATED: CPT

## 2020-03-13 PROCEDURE — 86140 C-REACTIVE PROTEIN: CPT

## 2020-03-13 PROCEDURE — 85652 RBC SED RATE AUTOMATED: CPT

## 2020-03-13 RX ORDER — LEVOTHYROXINE SODIUM 0.2 MG/1
200 TABLET ORAL DAILY
COMMUNITY
Start: 2020-02-27 | End: 2021-08-23 | Stop reason: ALTCHOICE

## 2020-03-13 RX ORDER — DICYCLOMINE HCL 20 MG
20 TABLET ORAL EVERY 6 HOURS
Qty: 30 TABLET | Refills: 3 | Status: SHIPPED | OUTPATIENT
Start: 2020-03-13 | End: 2021-02-03

## 2020-03-13 RX ORDER — PANTOPRAZOLE SODIUM 40 MG/1
40 TABLET, DELAYED RELEASE ORAL DAILY
Qty: 31 TABLET | Refills: 11 | Status: SHIPPED | OUTPATIENT
Start: 2020-03-13 | End: 2021-02-04 | Stop reason: SDUPTHER

## 2020-03-13 NOTE — PROGRESS NOTES
[facility] - Outpatient Follow-up Note  Alison Simms 35 y o  female MRN: [id number]  Encounter: [id number]          ASSESSMENT AND PLAN:      1  Dyspepsia  Improved on Pantoprazole 40mg daily - refilled  Mild gastritis noted on EGD in July of 2019    3  Irritable bowel syndrome with diarrhea  Recent flair secondary to severe stress  She is feeling better  Use dicyclomine as needed    ______________________________________________________________________    SUBJECTIVE:  35year old female presents for follow up evaluation of GERD and IBS-D  She is feeling well  She is taking pantoprazole 40 mg daily with good improvement in her acid symptoms  Her EGD from July 2019 demonstrated H pylori negative gastritis  She denies any nausea, vomiting or dysphagia  Her irritable bowel syndrome is recently well controlled  She admits that she did go through a flare over the past few weeks as there was significant increase in life stressors  This is resolving and she is feeling better  She does still report a left lower quadrant pain that comes and goes  She is taking dicyclomine for this in the past which has helped  Colonoscopy in July 2019 was reported as normal       REVIEW OF SYSTEMS IS OTHERWISE NEGATIVE        Historical Information   Past Medical History:   Diagnosis Date    Arthritis     RA    Colon polyp     Sleep apnea     Thyroid cancer ([address] )     Varicella      Past Surgical History:   Procedure Laterality Date    CHOLECYSTECTOMY      COLONOSCOPY      KNEE SURGERY Left     ACL repair     KNEE SURGERY      SKIN GRAFT Right     thumb    THYROIDECTOMY       Social History   Social History     Substance and Sexual Activity   Alcohol Use Yes    Frequency: 2-4 times a month    Comment: socially     Social History     Substance and Sexual Activity   Drug Use Never     Social History     Tobacco Use   Smoking Status Former Smoker    Last attempt to quit: 7/25/2015    Years since quittin 6   Smokeless Tobacco Never Used   Tobacco Comment    quit 5 years      Family History   Problem Relation Age of Onset    Rheum arthritis Mother     COPD Mother     Seizures Mother     No Known Problems Father     Rheum arthritis Sister     Fibromyalgia Sister     Thyroid disease Sister     Hypertension Maternal Grandmother     Lung cancer Maternal Grandmother     Heart disease Maternal Grandmother     Heart disease Maternal Grandfather     Emphysema Maternal Grandfather     Breast cancer Maternal Aunt     Colon cancer Neg Hx     Ovarian cancer Neg Hx     Cervical cancer Neg Hx     Uterine cancer Neg Hx        Meds/Allergies       Current Outpatient Medications:     Cholecalciferol (VITAMIN D-3) 5000 units TABS    docusate sodium (COLACE) 100 mg capsule    folic acid (FOLVITE) 1 mg tablet    levothyroxine 200 mcg tablet    methotrexate (RHEUMATREX) 2 5 MG tablet    nystatin (MYCOSTATIN) powder    pantoprazole (PROTONIX) 40 mg tablet    dicyclomine (BENTYL) 20 mg tablet    levothyroxine 175 mcg tablet    sertraline (ZOLOFT) 100 mg tablet    No Known Allergies        Objective     Blood pressure 112/80, pulse 87, height 5' 7" (1 702 m), weight 126 kg (277 lb 9 6 oz)  Body mass index is 43 48 kg/m²  PHYSICAL EXAM:      General Appearance:   Alert, cooperative, no distress   HEENT:   Normocephalic, atraumatic, anicteric      Neck:  Supple, symmetrical, trachea midline   Lungs:   Clear to auscultation bilaterally; no rales, rhonchi or wheezing; respirations unlabored    Heart[de-identified]   Regular rate and rhythm; no murmur, rub, or gallop     Abdomen:   Soft, non-tender, non-distended; normal bowel sounds; no masses, no organomegaly    Genitalia:   Deferred    Rectal:   Deferred    Extremities:  No cyanosis, clubbing or edema    Pulses:  2+ and symmetric    Skin:  No jaundice, rashes, or lesions    Lymph nodes:  No palpable cervical lymphadenopathy        Lab Results: Appointment on 03/13/2020   Component Date Value    Sodium 03/13/2020 139     Potassium 03/13/2020 4 3     Chloride 03/13/2020 104     CO2 03/13/2020 29     ANION GAP 03/13/2020 6     BUN 03/13/2020 11     Creatinine 03/13/2020 0 97     Glucose, Fasting 03/13/2020 92     Calcium 03/13/2020 8 4     AST 03/13/2020 24     ALT 03/13/2020 41     Alkaline Phosphatase 03/13/2020 45*    Total Protein 03/13/2020 7 1     Albumin 03/13/2020 3 5     Total Bilirubin 03/13/2020 0 50     eGFR 03/13/2020 73          Radiology Results:   No results found

## 2020-03-30 ENCOUNTER — TELEPHONE (OUTPATIENT)
Dept: PULMONOLOGY | Facility: CLINIC | Age: 40
End: 2020-03-30

## 2020-03-30 NOTE — TELEPHONE ENCOUNTER
Pt called stating we did not send office note from Jan 2020 to [facility] and they are threatening to come get it  I spoke to Angelica Valente at [facility] and it seems that although compliant now  She was not compliant for several months leading up to our Jan appt  Note does mention pt having a hard time adjusting but that she was using it more  Angelica Valente at [facility] will explain her Appeal option

## 2020-04-04 ENCOUNTER — TELEPHONE (OUTPATIENT)
Dept: OTHER | Facility: OTHER | Age: 40
End: 2020-04-04

## 2020-04-04 ENCOUNTER — OFFICE VISIT (OUTPATIENT)
Dept: URGENT CARE | Facility: MEDICAL CENTER | Age: 40
End: 2020-04-04
Payer: COMMERCIAL

## 2020-04-04 ENCOUNTER — TELEMEDICINE (OUTPATIENT)
Dept: FAMILY MEDICINE CLINIC | Facility: CLINIC | Age: 40
End: 2020-04-04
Payer: COMMERCIAL

## 2020-04-04 VITALS
WEIGHT: 270 LBS | HEIGHT: 67 IN | OXYGEN SATURATION: 97 % | BODY MASS INDEX: 42.38 KG/M2 | HEART RATE: 108 BPM | RESPIRATION RATE: 16 BRPM | TEMPERATURE: 97.1 F

## 2020-04-04 DIAGNOSIS — R06.02 SOB (SHORTNESS OF BREATH): ICD-10-CM

## 2020-04-04 DIAGNOSIS — Z20.828 EXPOSURE TO SARS-ASSOCIATED CORONAVIRUS: Primary | ICD-10-CM

## 2020-04-04 DIAGNOSIS — R50.81 FEVER IN OTHER DISEASES: Primary | ICD-10-CM

## 2020-04-04 DIAGNOSIS — R50.81 FEVER IN OTHER DISEASES: ICD-10-CM

## 2020-04-04 DIAGNOSIS — R53.83 OTHER FATIGUE: ICD-10-CM

## 2020-04-04 PROBLEM — R50.9 FEVER: Status: ACTIVE | Noted: 2020-04-04

## 2020-04-04 PROCEDURE — 87635 SARS-COV-2 COVID-19 AMP PRB: CPT | Performed by: PHYSICIAN ASSISTANT

## 2020-04-04 PROCEDURE — 99212 OFFICE O/P EST SF 10 MIN: CPT | Performed by: PHYSICIAN ASSISTANT

## 2020-04-06 ENCOUNTER — NURSE TRIAGE (OUTPATIENT)
Dept: OTHER | Facility: OTHER | Age: 40
End: 2020-04-06

## 2020-04-06 ENCOUNTER — TELEPHONE (OUTPATIENT)
Dept: OTHER | Facility: OTHER | Age: 40
End: 2020-04-06

## 2020-04-07 ENCOUNTER — TELEPHONE (OUTPATIENT)
Dept: FAMILY MEDICINE CLINIC | Facility: CLINIC | Age: 40
End: 2020-04-07

## 2020-04-07 DIAGNOSIS — U07.1 COVID-19 VIRUS INFECTION: Primary | ICD-10-CM

## 2020-04-07 LAB — SARS-COV-2 RNA SPEC QL NAA+PROBE: DETECTED

## 2020-04-07 RX ORDER — BENZONATATE 200 MG/1
200 CAPSULE ORAL 3 TIMES DAILY PRN
Qty: 30 CAPSULE | Refills: 1 | Status: SHIPPED | OUTPATIENT
Start: 2020-04-07 | End: 2020-08-20 | Stop reason: ALTCHOICE

## 2020-04-14 ENCOUNTER — TELEMEDICINE (OUTPATIENT)
Dept: FAMILY MEDICINE CLINIC | Facility: CLINIC | Age: 40
End: 2020-04-14
Payer: COMMERCIAL

## 2020-04-14 VITALS — TEMPERATURE: 97.8 F

## 2020-04-14 DIAGNOSIS — U07.1 COVID-19 VIRUS INFECTION: ICD-10-CM

## 2020-04-14 DIAGNOSIS — R06.02 SOB (SHORTNESS OF BREATH): Primary | ICD-10-CM

## 2020-04-14 PROCEDURE — 99214 OFFICE O/P EST MOD 30 MIN: CPT | Performed by: NURSE PRACTITIONER

## 2020-04-14 RX ORDER — ALBUTEROL SULFATE 90 UG/1
2 AEROSOL, METERED RESPIRATORY (INHALATION) EVERY 6 HOURS PRN
Qty: 1 INHALER | Refills: 5 | Status: SHIPPED | OUTPATIENT
Start: 2020-04-14 | End: 2021-09-27 | Stop reason: SDUPTHER

## 2020-04-20 ENCOUNTER — TELEMEDICINE (OUTPATIENT)
Dept: FAMILY MEDICINE CLINIC | Facility: CLINIC | Age: 40
End: 2020-04-20
Payer: COMMERCIAL

## 2020-04-20 VITALS — WEIGHT: 265 LBS | HEIGHT: 67 IN | BODY MASS INDEX: 41.59 KG/M2 | TEMPERATURE: 97.4 F

## 2020-04-20 DIAGNOSIS — F41.8 DEPRESSION WITH ANXIETY: ICD-10-CM

## 2020-04-20 DIAGNOSIS — Z12.39 SCREENING FOR BREAST CANCER: ICD-10-CM

## 2020-04-20 DIAGNOSIS — U07.1 COVID-19 VIRUS INFECTION: Primary | ICD-10-CM

## 2020-04-20 PROBLEM — R50.9 FEVER: Status: RESOLVED | Noted: 2020-04-04 | Resolved: 2020-04-20

## 2020-04-20 PROBLEM — B37.9 YEAST INFECTION: Status: RESOLVED | Noted: 2019-05-28 | Resolved: 2020-04-20

## 2020-04-20 PROCEDURE — 99214 OFFICE O/P EST MOD 30 MIN: CPT | Performed by: NURSE PRACTITIONER

## 2020-05-13 ENCOUNTER — HOSPITAL ENCOUNTER (OUTPATIENT)
Dept: MAMMOGRAPHY | Facility: CLINIC | Age: 40
Discharge: HOME | End: 2020-05-13
Payer: COMMERCIAL

## 2020-05-13 VITALS — BODY MASS INDEX: 41.59 KG/M2 | HEIGHT: 67 IN | WEIGHT: 265 LBS

## 2020-05-13 DIAGNOSIS — Z12.39 SCREENING FOR BREAST CANCER: ICD-10-CM

## 2020-05-13 PROCEDURE — 77063 BREAST TOMOSYNTHESIS BI: CPT

## 2020-05-13 PROCEDURE — 77067 SCR MAMMO BI INCL CAD: CPT

## 2020-05-15 ENCOUNTER — PATIENT OUTREACH (OUTPATIENT)
Dept: CASE MANAGEMENT | Facility: OTHER | Age: 40
End: 2020-05-15

## 2020-05-20 ENCOUNTER — TRANSCRIBE ORDERS (OUTPATIENT)
Dept: MAMMOGRAPHY | Facility: CLINIC | Age: 40
End: 2020-05-20

## 2020-05-20 ENCOUNTER — HOSPITAL ENCOUNTER (OUTPATIENT)
Dept: ULTRASOUND IMAGING | Facility: CLINIC | Age: 40
Discharge: HOME | End: 2020-05-20
Payer: COMMERCIAL

## 2020-05-20 ENCOUNTER — HOSPITAL ENCOUNTER (OUTPATIENT)
Dept: MAMMOGRAPHY | Facility: CLINIC | Age: 40
Discharge: HOME | End: 2020-05-20
Payer: COMMERCIAL

## 2020-05-20 DIAGNOSIS — R92.8 ABNORMAL MAMMOGRAM: ICD-10-CM

## 2020-05-20 DIAGNOSIS — R92.8 ABNORMAL MAMMOGRAM: Primary | ICD-10-CM

## 2020-05-20 PROCEDURE — 76642 ULTRASOUND BREAST LIMITED: CPT

## 2020-05-20 PROCEDURE — G0279 TOMOSYNTHESIS, MAMMO: HCPCS

## 2020-05-20 PROCEDURE — 77065 DX MAMMO INCL CAD UNI: CPT

## 2020-06-02 ENCOUNTER — TELEPHONE (OUTPATIENT)
Dept: FAMILY MEDICINE CLINIC | Facility: CLINIC | Age: 40
End: 2020-06-02

## 2020-06-02 ENCOUNTER — TELEPHONE (OUTPATIENT)
Dept: OTHER | Facility: HOSPITAL | Age: 40
End: 2020-06-02

## 2020-06-02 DIAGNOSIS — U07.1 COVID-19 VIRUS INFECTION: Primary | ICD-10-CM

## 2020-06-03 DIAGNOSIS — U07.1 COVID-19 VIRUS INFECTION: ICD-10-CM

## 2020-06-08 ENCOUNTER — TELEPHONE (OUTPATIENT)
Dept: URGENT CARE | Facility: CLINIC | Age: 40
End: 2020-06-08

## 2020-06-08 ENCOUNTER — TELEPHONE (OUTPATIENT)
Dept: OTHER | Facility: HOSPITAL | Age: 40
End: 2020-06-08

## 2020-06-08 DIAGNOSIS — Z11.59 SCREENING FOR VIRAL DISEASE: Primary | ICD-10-CM

## 2020-06-09 ENCOUNTER — TELEPHONE (OUTPATIENT)
Dept: FAMILY MEDICINE CLINIC | Facility: CLINIC | Age: 40
End: 2020-06-09

## 2020-06-09 DIAGNOSIS — Z11.59 SCREENING FOR VIRAL DISEASE: ICD-10-CM

## 2020-06-09 PROCEDURE — U0003 INFECTIOUS AGENT DETECTION BY NUCLEIC ACID (DNA OR RNA); SEVERE ACUTE RESPIRATORY SYNDROME CORONAVIRUS 2 (SARS-COV-2) (CORONAVIRUS DISEASE [COVID-19]), AMPLIFIED PROBE TECHNIQUE, MAKING USE OF HIGH THROUGHPUT TECHNOLOGIES AS DESCRIBED BY CMS-2020-01-R: HCPCS

## 2020-06-10 LAB — SARS-COV-2 RNA SPEC QL NAA+PROBE: NOT DETECTED

## 2020-06-16 ENCOUNTER — APPOINTMENT (OUTPATIENT)
Dept: LAB | Facility: HOSPITAL | Age: 40
End: 2020-06-16
Payer: COMMERCIAL

## 2020-06-16 ENCOUNTER — TRANSCRIBE ORDERS (OUTPATIENT)
Dept: ADMINISTRATIVE | Facility: HOSPITAL | Age: 40
End: 2020-06-16

## 2020-06-16 DIAGNOSIS — M05.79 RHEUMATOID ARTHRITIS INVOLVING MULTIPLE SITES WITH POSITIVE RHEUMATOID FACTOR (HCC): ICD-10-CM

## 2020-06-16 DIAGNOSIS — M05.79 RHEUMATOID ARTHRITIS INVOLVING MULTIPLE SITES WITH POSITIVE RHEUMATOID FACTOR (HCC): Primary | ICD-10-CM

## 2020-06-16 DIAGNOSIS — Z79.899 ENCOUNTER FOR LONG-TERM (CURRENT) USE OF MEDICATIONS: ICD-10-CM

## 2020-06-16 LAB
ALBUMIN SERPL BCP-MCNC: 3.5 G/DL (ref 3.5–5)
ALP SERPL-CCNC: 40 U/L (ref 46–116)
ALT SERPL W P-5'-P-CCNC: 31 U/L (ref 12–78)
ANION GAP SERPL CALCULATED.3IONS-SCNC: 9 MMOL/L (ref 4–13)
AST SERPL W P-5'-P-CCNC: 17 U/L (ref 5–45)
BASOPHILS # BLD AUTO: 0.04 THOUSANDS/ΜL (ref 0–0.1)
BASOPHILS NFR BLD AUTO: 1 % (ref 0–1)
BILIRUB SERPL-MCNC: 0.4 MG/DL (ref 0.2–1)
BUN SERPL-MCNC: 11 MG/DL (ref 5–25)
CALCIUM SERPL-MCNC: 8.3 MG/DL (ref 8.3–10.1)
CHLORIDE SERPL-SCNC: 105 MMOL/L (ref 100–108)
CO2 SERPL-SCNC: 25 MMOL/L (ref 21–32)
CREAT SERPL-MCNC: 0.76 MG/DL (ref 0.6–1.3)
CRP SERPL QL: 12.5 MG/L
EOSINOPHIL # BLD AUTO: 0.1 THOUSAND/ΜL (ref 0–0.61)
EOSINOPHIL NFR BLD AUTO: 1 % (ref 0–6)
ERYTHROCYTE [DISTWIDTH] IN BLOOD BY AUTOMATED COUNT: 13.3 % (ref 11.6–15.1)
ERYTHROCYTE [SEDIMENTATION RATE] IN BLOOD: 12 MM/HOUR (ref 2–25)
GFR SERPL CREATININE-BSD FRML MDRD: 98 ML/MIN/1.73SQ M
GLUCOSE SERPL-MCNC: 116 MG/DL (ref 65–140)
HCT VFR BLD AUTO: 38.2 % (ref 34.8–46.1)
HGB BLD-MCNC: 12.4 G/DL (ref 11.5–15.4)
IMM GRANULOCYTES # BLD AUTO: 0.04 THOUSAND/UL (ref 0–0.2)
IMM GRANULOCYTES NFR BLD AUTO: 1 % (ref 0–2)
LYMPHOCYTES # BLD AUTO: 1.42 THOUSANDS/ΜL (ref 0.6–4.47)
LYMPHOCYTES NFR BLD AUTO: 19 % (ref 14–44)
MCH RBC QN AUTO: 29.4 PG (ref 26.8–34.3)
MCHC RBC AUTO-ENTMCNC: 32.5 G/DL (ref 31.4–37.4)
MCV RBC AUTO: 91 FL (ref 82–98)
MONOCYTES # BLD AUTO: 0.42 THOUSAND/ΜL (ref 0.17–1.22)
MONOCYTES NFR BLD AUTO: 6 % (ref 4–12)
NEUTROPHILS # BLD AUTO: 5.65 THOUSANDS/ΜL (ref 1.85–7.62)
NEUTS SEG NFR BLD AUTO: 72 % (ref 43–75)
NRBC BLD AUTO-RTO: 0 /100 WBCS
PLATELET # BLD AUTO: 382 THOUSANDS/UL (ref 149–390)
PMV BLD AUTO: 10.1 FL (ref 8.9–12.7)
POTASSIUM SERPL-SCNC: 3.9 MMOL/L (ref 3.5–5.3)
PROT SERPL-MCNC: 6.9 G/DL (ref 6.4–8.2)
RBC # BLD AUTO: 4.22 MILLION/UL (ref 3.81–5.12)
SODIUM SERPL-SCNC: 139 MMOL/L (ref 136–145)
WBC # BLD AUTO: 7.67 THOUSAND/UL (ref 4.31–10.16)

## 2020-06-16 PROCEDURE — 80053 COMPREHEN METABOLIC PANEL: CPT

## 2020-06-16 PROCEDURE — 85652 RBC SED RATE AUTOMATED: CPT

## 2020-06-16 PROCEDURE — 86140 C-REACTIVE PROTEIN: CPT

## 2020-06-16 PROCEDURE — 36415 COLL VENOUS BLD VENIPUNCTURE: CPT

## 2020-06-16 PROCEDURE — 85025 COMPLETE CBC W/AUTO DIFF WBC: CPT

## 2020-08-20 ENCOUNTER — TELEMEDICINE (OUTPATIENT)
Dept: BEHAVIORAL/MENTAL HEALTH CLINIC | Facility: CLINIC | Age: 40
End: 2020-08-20
Payer: COMMERCIAL

## 2020-08-20 ENCOUNTER — OFFICE VISIT (OUTPATIENT)
Dept: FAMILY MEDICINE CLINIC | Facility: CLINIC | Age: 40
End: 2020-08-20
Payer: COMMERCIAL

## 2020-08-20 VITALS
BODY MASS INDEX: 42.69 KG/M2 | SYSTOLIC BLOOD PRESSURE: 116 MMHG | OXYGEN SATURATION: 100 % | TEMPERATURE: 96.3 F | WEIGHT: 272 LBS | HEIGHT: 67 IN | DIASTOLIC BLOOD PRESSURE: 72 MMHG | HEART RATE: 96 BPM

## 2020-08-20 DIAGNOSIS — F41.8 DEPRESSION WITH ANXIETY: Primary | ICD-10-CM

## 2020-08-20 DIAGNOSIS — R06.02 SHORTNESS OF BREATH ON EXERTION: ICD-10-CM

## 2020-08-20 DIAGNOSIS — G47.33 OSA (OBSTRUCTIVE SLEEP APNEA): ICD-10-CM

## 2020-08-20 PROBLEM — R53.83 OTHER FATIGUE: Status: RESOLVED | Noted: 2020-04-04 | Resolved: 2020-08-20

## 2020-08-20 PROBLEM — B49 FUNGAL INFECTION: Status: RESOLVED | Noted: 2019-05-28 | Resolved: 2020-08-20

## 2020-08-20 PROBLEM — U07.1 COVID-19 VIRUS INFECTION: Status: RESOLVED | Noted: 2020-04-14 | Resolved: 2020-08-20

## 2020-08-20 PROCEDURE — 99214 OFFICE O/P EST MOD 30 MIN: CPT | Performed by: NURSE PRACTITIONER

## 2020-08-20 PROCEDURE — 1036F TOBACCO NON-USER: CPT | Performed by: NURSE PRACTITIONER

## 2020-08-20 PROCEDURE — 3008F BODY MASS INDEX DOCD: CPT | Performed by: NURSE PRACTITIONER

## 2020-08-20 PROCEDURE — 90791 PSYCH DIAGNOSTIC EVALUATION: CPT | Performed by: SOCIAL WORKER

## 2020-08-20 RX ORDER — BUSPIRONE HYDROCHLORIDE 5 MG/1
5 TABLET ORAL 3 TIMES DAILY
Qty: 90 TABLET | Refills: 0 | Status: SHIPPED | OUTPATIENT
Start: 2020-08-20 | End: 2020-09-14

## 2020-08-20 NOTE — BH TREATMENT PLAN
Treatment Plan not complete within time limits due to: Not done within 30 days of initial visit due to; Intensive intake, entire session was needed to gather all relevant information

## 2020-08-20 NOTE — PROGRESS NOTES
Assessment/Plan:    Depression with anxiety  To resume sertraline 50 mg daily and buspirone 5 mg every 8 hours  To resume counseling  Counseled on limiting caffeine and beginning daily physical activity  Follow-up in 1 month or sooner if needed  Shortness of breath on exertion  Patient has been having shortness of breath with mild exertion since COVID infection in April  She has been using her albuterol inhaler about twice a week  Advised to avoid respiratory irritants  To obtain pulmonary function test if symptoms do not begin to improve in the next couple weeks  NEO (obstructive sleep apnea)  Patient has not been using CPAP as it was required to be returned while patient was hospitalized due to COVID infection  She is still awaiting her CPAP machine to be return to her  Counseled patient on the side effects of untreated sleep apnea included sleep interruption and fatigue  Diagnoses and all orders for this visit:    Depression with anxiety  -     sertraline (ZOLOFT) 50 mg tablet; Take half tab x1 week, then increase to 1 tab daily  -     busPIRone (BUSPAR) 5 mg tablet; Take 1 tablet (5 mg total) by mouth 3 (three) times a day    NEO (obstructive sleep apnea)    Shortness of breath on exertion  -     Complete PFT with post bronchodilator; Future          Subjective:      Patient ID: Shwetha Valdivia is a 35 y o  female  Renny Douglas presents reporting a worsening of anxiety and depression within the past couple weeks  She is having difficulty sleeping, decrease in appetite, fatigue, feeling on edge, agitation, decrease in concentration, and has been experiencing panic attacks  Renny Douglas has a history of anxiety, depression and PTSD  She was previously on Zoloft in the past and found this helpful  She would like to resume Zoloft and buspirone  Renny Douglas also plans on making an appointment to resume counseling    This past year she has had many life stressors which included loss of family members and a recent COVID infection with hospitalization in April  Also of note, since her COVID infection, Blane Sorensen has been experiencing shortness of breath with mild exertion  She has no history of asthma  Blane Sorensen has been using her albuterol inhaler about twice a week  Denies fever, chills, or wheezing  In regards to her sleep apnea, Ashlee Carlos was made to return her CPAP machine after her hospitalization due to COVID-19 in April  She is still awaiting to have her CPAP return to her        The following portions of the patient's history were reviewed and updated as appropriate:   She   Patient Active Problem List    Diagnosis Date Noted    Shortness of breath on exertion 04/04/2020    NEO (obstructive sleep apnea)     Impaired fasting glucose 05/29/2019    Menorrhagia with regular cycle 04/08/2019    Post traumatic stress disorder (PTSD) 01/25/2019    Depression with anxiety 12/10/2018    Gastroesophageal reflux disease without esophagitis 03/27/2018    Morbid obesity with BMI of 40 0-44 9, adult ([place] [address] ) 03/27/2018    Post-surgical hypothyroidism 03/27/2018    Rheumatoid arthritis involving multiple sites ([place] [address] ) 03/27/2018    Thyroid cancer ([place] [address] ) 03/27/2018     Current Outpatient Medications   Medication Sig Dispense Refill    albuterol (PROVENTIL HFA,VENTOLIN HFA) 90 mcg/act inhaler Inhale 2 puffs every 6 (six) hours as needed for wheezing or shortness of breath 1 Inhaler 5    ascorbic Acid (VITAMIN C) 500 MG CPCR Take 500 mg by mouth daily      Cholecalciferol (VITAMIN D-3) 5000 units TABS Take 5,000 Units by mouth      dicyclomine (BENTYL) 20 mg tablet Take 1 tablet (20 mg total) by mouth every 6 (six) hours 30 tablet 3    folic acid (FOLVITE) 1 mg tablet       levothyroxine 200 mcg tablet Take 200 mcg by mouth daily      methotrexate (RHEUMATREX) 2 5 MG tablet Take 12 5 mg by mouth      pantoprazole (PROTONIX) 40 mg tablet Take 1 tablet (40 mg total) by mouth daily 31 tablet 11    busPIRone (BUSPAR) 5 mg tablet Take 1 tablet (5 mg total) by mouth 3 (three) times a day 90 tablet 0    sertraline (ZOLOFT) 50 mg tablet Take half tab x1 week, then increase to 1 tab daily  30 tablet 0     No current facility-administered medications for this visit  She has No Known Allergies       Review of Systems   Constitutional: Positive for fatigue and unexpected weight change  HENT: Negative  Respiratory: Positive for cough and shortness of breath (on exertion)  Cardiovascular: Positive for chest pain (with panic attacks)  Gastrointestinal: Negative  Neurological: Negative  Psychiatric/Behavioral: Positive for agitation, decreased concentration and sleep disturbance  The patient is nervous/anxious  /72   Pulse 96   Temp (!) 96 3 °F (35 7 °C)   Ht 5' 7" (1 702 m)   Wt 123 kg (272 lb)   SpO2 100%   BMI 42 60 kg/m²     Objective:     Physical Exam  Vitals signs and nursing note reviewed  Constitutional:       Appearance: She is well-developed  HENT:      Head: Normocephalic and atraumatic  Eyes:      Conjunctiva/sclera: Conjunctivae normal    Neck:      Musculoskeletal: Neck supple  Cardiovascular:      Rate and Rhythm: Normal rate and regular rhythm  Heart sounds: Normal heart sounds  No murmur  Pulmonary:      Effort: Pulmonary effort is normal  No respiratory distress  Breath sounds: Normal breath sounds  No wheezing or rales  Chest:      Chest wall: No tenderness  Neurological:      Mental Status: She is alert and oriented to person, place, and time  Psychiatric:         Attention and Perception: Attention normal          Mood and Affect: Mood is anxious  Speech: Speech normal          Behavior: Behavior normal          Thought Content:  Thought content normal          Judgment: Judgment normal       Comments: Poor eye contact at times throughout exam
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
## 2020-08-20 NOTE — ASSESSMENT & PLAN NOTE
To resume sertraline 50 mg daily and buspirone 5 mg every 8 hours  To resume counseling  Counseled on limiting caffeine and beginning daily physical activity  Follow-up in 1 month or sooner if needed

## 2020-08-20 NOTE — ASSESSMENT & PLAN NOTE
Patient has been having shortness of breath with mild exertion since COVID infection in April  She has been using her albuterol inhaler about twice a week  Advised to avoid respiratory irritants  To obtain pulmonary function test if symptoms do not begin to improve in the next couple weeks

## 2020-08-20 NOTE — ASSESSMENT & PLAN NOTE
Patient has not been using CPAP as it was required to be returned while patient was hospitalized due to COVID infection  She is still awaiting her CPAP machine to be return to her  Counseled patient on the side effects of untreated sleep apnea included sleep interruption and fatigue

## 2020-08-27 ENCOUNTER — SOCIAL WORK (OUTPATIENT)
Dept: BEHAVIORAL/MENTAL HEALTH CLINIC | Facility: CLINIC | Age: 40
End: 2020-08-27
Payer: COMMERCIAL

## 2020-08-27 DIAGNOSIS — F41.8 DEPRESSION WITH ANXIETY: Primary | ICD-10-CM

## 2020-08-27 PROCEDURE — 90834 PSYTX W PT 45 MINUTES: CPT | Performed by: SOCIAL WORKER

## 2020-08-27 NOTE — PSYCH
Psychotherapy Provided: Individual Psychotherapy 50 minutes     Length of time in session: 50 minutes, follow up in 2 week    Goals addressed in session: Goal 1     Pain:      mild    3    Current suicide risk : Santana Robins reported on her upcoming dental appointment and the anxiety of losing her poor death  She discussed her increased anxiety also related to COVID and her recovery from it  She feels that she is struggling with embracing the fact that she will no longer need to "hide" her face or behind her teeth and can fully embrace her healthiest self  Session focused on processing that this next step is an extension of the trauma work she did and that in fact this is the last step to shedding that old self  We discussed having a concrete way to honor that past self, thanking it for what it provided, safety, ability to move out of [place], starting a new relationship with her now wife and finding a job, however she no longer is in need of what it provides and can begin to embrace the new life she wants for herself  She agreed with this, and agreed to try this the night before her procedure  Suzie Noble was neatly dressed, fully oriented, made eye contact, with speech of normal rate and volume  Her affect was anxious, her mood was also anxious  Her thought process was logical, she was attentive and demonstrated insight  She will be seen again in two weeks, she will execute plan to say good and thank her prior hiding  Behavioral Health Treatment Plan [facility]: Diagnosis and Treatment Plan explained to Cuong Darling relates understanding diagnosis and is agreeable to Treatment Plan   Yes

## 2020-08-27 NOTE — BH TREATMENT PLAN
Elizabeth Samuelrom  1980       Date of Initial Treatment Plan: 08/27/2020   Date of Current Treatment Plan: 08/27/20    Treatment Plan Number 1     Strengths/Personal Resources for Self Care: employed, pursuing education, strong relationship, good friends, close family members, loyal, honest, sense of humor, loves animals  Diagnosis:   1  Depression with anxiety         Area of Needs: addressing anxiety      Long Term Goal 1: Get control of my anxiety    Target Date: 01/27/2021  Completion Date: TBD         Short Term Objectives for Goal 1: See Objectives Below  1) Anika Bertrand will use session to identify stressors and process feelings  2) Anika Spanish will use session to identify methods for fully embracing confidence in herself  3)  Anika Spanish will follow up with medication management and take medication as prescribed  4) Anika Spanish will utilize two  To three methods to reduce anxiety  Anikajesus Bertrand will be seen 2 times per month, termination of services is TBD  The persons responsible for carrying out this plan is Becca Torres and Flip Fuentes    Modalities: CBT, behavior modification, insight oriented therapy, psychoeducation, mindfulness, problem solving skills, stress management skills  Behavioral Health Treatment Plan [facility]: Diagnosis and Treatment Plan explained to Votaw Mihaela relates understanding diagnosis and is agreeable to Treatment Plan  Client Comments : Please share your thoughts, feelings, need and/or experiences regarding your treatment plan: Anika Bertrand was easily able to identify treatment goals

## 2020-09-10 ENCOUNTER — SOCIAL WORK (OUTPATIENT)
Dept: BEHAVIORAL/MENTAL HEALTH CLINIC | Facility: CLINIC | Age: 40
End: 2020-09-10
Payer: COMMERCIAL

## 2020-09-10 DIAGNOSIS — F41.8 DEPRESSION WITH ANXIETY: Primary | ICD-10-CM

## 2020-09-10 PROCEDURE — 90834 PSYTX W PT 45 MINUTES: CPT | Performed by: SOCIAL WORKER

## 2020-09-10 NOTE — PSYCH
Psychotherapy Provided: Individual Psychotherapy 50 minutes     Length of time in session: 50 minutes, follow up in 2 week    Goals addressed in session: Goal 1     Pain:      none    0    Current suicide risk : Low   Magalene Search presented for session with her mask, her dental work has been completed, she has not shown anyone her partial except her wife  She reported it was a difficult experience, painful, she has to take the partial out at night, which gives her mixed feelings, and she has mixed feelings when looking in the mirror  She realizes she will need to take charge of "unveiling" her new look, she did tell her mother with clear boundaries which her mother "mostly" followed  Session focused on processing the loss of the last vestige of her trauma, having a clear road not to move ahead defining how she thrives  Roma Lemon spoke of how this frightens her, the fear of embracing herself fully, and not having anything left to "hide behind"  We processed this, normalizing the fear, highlighting her resources and supports, and that she has been in fact defining herself as a thrivor for the past two years  She agreed  We discussed the freedom attached to being able to fully embrace herself, that her core characteristics will remain, her honesty, pragmatism, sharp wit, intelligence, sense of loyalty, and other core characteristics now can have the freedom of being used to thrive instead of "just getting by " We further identified she has already embraced moving her career forward, changing majors and ultimately professions, defining herself by her strengths nor her flaws  We identified how to acknowledge the fear, greet it, but not allow it to take over as it has in the past for good reason  The basic question for her to answer is who is she as a thrivor  She agreed to keep this front in center, as she moves forward   We also discussed a time line for removing the mask with trusted friends, as this keeps her in a mindset of gabying  Britton Serrano will be seen again in two weeks, she will move ahead with defining herself as a thrivor  Behavioral Health Treatment Plan [facility]: Diagnosis and Treatment Plan explained to Alysha Najera relates understanding diagnosis and is agreeable to Treatment Plan   Yes

## 2020-09-11 DIAGNOSIS — F41.8 DEPRESSION WITH ANXIETY: ICD-10-CM

## 2020-09-14 ENCOUNTER — TELEPHONE (OUTPATIENT)
Dept: BEHAVIORAL/MENTAL HEALTH CLINIC | Facility: CLINIC | Age: 40
End: 2020-09-14

## 2020-09-14 RX ORDER — BUSPIRONE HYDROCHLORIDE 5 MG/1
TABLET ORAL
Qty: 90 TABLET | Refills: 0 | Status: SHIPPED | OUTPATIENT
Start: 2020-09-14 | End: 2020-10-12

## 2020-09-21 ENCOUNTER — OFFICE VISIT (OUTPATIENT)
Dept: FAMILY MEDICINE CLINIC | Facility: CLINIC | Age: 40
End: 2020-09-21
Payer: COMMERCIAL

## 2020-09-21 VITALS
OXYGEN SATURATION: 97 % | SYSTOLIC BLOOD PRESSURE: 120 MMHG | BODY MASS INDEX: 42.69 KG/M2 | WEIGHT: 272 LBS | RESPIRATION RATE: 16 BRPM | HEIGHT: 67 IN | DIASTOLIC BLOOD PRESSURE: 80 MMHG | HEART RATE: 102 BPM | TEMPERATURE: 96.8 F

## 2020-09-21 DIAGNOSIS — F41.8 DEPRESSION WITH ANXIETY: ICD-10-CM

## 2020-09-21 DIAGNOSIS — Z00.00 ANNUAL PHYSICAL EXAM: ICD-10-CM

## 2020-09-21 DIAGNOSIS — Z23 NEED FOR PNEUMOCOCCAL VACCINATION: Primary | ICD-10-CM

## 2020-09-21 DIAGNOSIS — E66.01 MORBID OBESITY WITH BMI OF 40.0-44.9, ADULT (HCC): ICD-10-CM

## 2020-09-21 PROBLEM — R06.02 SHORTNESS OF BREATH ON EXERTION: Status: RESOLVED | Noted: 2020-04-04 | Resolved: 2020-09-21

## 2020-09-21 PROCEDURE — 99396 PREV VISIT EST AGE 40-64: CPT | Performed by: NURSE PRACTITIONER

## 2020-09-21 PROCEDURE — 90732 PPSV23 VACC 2 YRS+ SUBQ/IM: CPT

## 2020-09-21 PROCEDURE — 1036F TOBACCO NON-USER: CPT | Performed by: NURSE PRACTITIONER

## 2020-09-21 PROCEDURE — 90471 IMMUNIZATION ADMIN: CPT

## 2020-09-21 NOTE — PROGRESS NOTES
[facility] [address]    NAME: Magdalena Allen  AGE: 35 y o  SEX: female  : 1980     DATE: 2020     Assessment and Plan:     Problem List Items Addressed This Visit        Other    Morbid obesity with BMI of 40 0-44 9, adult ([address] )     To obtain lipid panel, counseled on the importance of healthy food choices increasing dietary fiber, and daily physical activity  Relevant Orders    Lipid Panel with Direct LDL reflex    Depression with anxiety     Anxiety and depression has improved since initiating sertraline 50 mg and buspirone 5 mg every 8 hours  To continue counseling  Follow-up in 4 months or sooner if needed  Relevant Medications    sertraline (ZOLOFT) 50 mg tablet    Annual physical exam     To date on dental cleaning  Advised to make an appointment for an eye exam   Influenza and Pneumovax immunization administered today  Other Visit Diagnoses     Need for pneumococcal vaccination    -  Primary    Relevant Orders    PNEUMOCOCCAL POLYSACCHARIDE VACCINE 23-VALENT =>1YO SQ IM (Completed)          Immunizations and preventive care screenings were discussed with patient today  Appropriate education was printed on patient's after visit summary  Counseling:  Alcohol/drug use: discussed moderation in alcohol intake, the recommendations for healthy alcohol use, and avoidance of illicit drug use  Dental Health: discussed importance of regular tooth brushing, flossing, and dental visits  Injury prevention: discussed safety/seat belts, safety helmets, smoke detectors, carbon dioxide detectors, and smoking near bedding or upholstery  Sexual health: discussed sexually transmitted diseases, partner selection, use of condoms, avoidance of unintended pregnancy, and contraceptive alternatives  · Exercise: the importance of regular exercise/physical activity was discussed  Recommend exercise 3-5 times per week for at least 30 minutes  Return in about 4 months (around 1/21/2021), or if symptoms worsen or fail to improve  Chief Complaint:     Chief Complaint   Patient presents with    Physical Exam      History of Present Illness:     Adult Annual Physical   Patient here for a comprehensive physical exam  The patient reports problems - Follow-up for anxiety and depression  Hemal Polo was initiated on sertraline 50 mg and buspirone 5 mg every 8 hours about a month ago  Since this time, her anxiety and depression have improved  She has also resumed counseling  Diet and Physical Activity  · Diet/Nutrition: well balanced diet  · Exercise: walking and 3-4 times a week on average  Depression Screening  PHQ-9 Depression Screening    PHQ-9:    Frequency of the following problems over the past two weeks:            General Health  · Sleep: sleeps well and gets 7-8 hours of sleep on average  · Hearing: normal - bilateral   · Vision: most recent eye exam >1 year ago and Blurred vision when looking at phone for prolonged period of time  · Dental: regular dental visits  /GYN Health  · Patient is: premenopausal  · Last menstrual period: 9/1/20  · Contraceptive method:     Review of Systems:     Review of Systems   Constitutional: Positive for appetite change ( decrease)  HENT: Positive for dental problem (Dental discomfort due to recent dental extractions)  Respiratory: Negative  Cardiovascular: Negative  Gastrointestinal: Negative  Endocrine: Negative  Genitourinary: Negative  Musculoskeletal: Negative  Skin: Negative  Allergic/Immunologic: Negative  Neurological: Negative  Hematological: Negative  Psychiatric/Behavioral: Negative         Past Medical History:     Past Medical History:   Diagnosis Date    Arthritis     RA    Colon polyp     Sleep apnea     Thyroid cancer ([facility] [address] )     Varicella       Past Surgical History:     Past Surgical History:   Procedure Laterality Date    CHOLECYSTECTOMY      COLONOSCOPY      KNEE SURGERY Left     ACL repair     KNEE SURGERY      SKIN GRAFT Right     thumb    THYROIDECTOMY        Social History:     E-Cigarette/Vaping    E-Cigarette Use Never User      E-Cigarette/Vaping Substances    Nicotine No     THC No     CBD No     Flavoring No     Other No     Unknown No      Social History     Socioeconomic History    Marital status: Unknown     Spouse name: None    Number of children: None    Years of education: None    Highest education level: None   Occupational History    None   Social Needs    Financial resource strain: None    Food insecurity     Worry: None     Inability: None    Transportation needs     Medical: None     Non-medical: None   Tobacco Use    Smoking status: Former Smoker     Last attempt to quit: 2015     Years since quittin 1    Smokeless tobacco: Never Used    Tobacco comment: quit 5 years    Substance and Sexual Activity    Alcohol use: Yes     Frequency: 2-4 times a month     Comment: socially    Drug use: Never    Sexual activity: Yes     Partners: Female     Birth control/protection: None   Lifestyle    Physical activity     Days per week: None     Minutes per session: None    Stress: None   Relationships    Social connections     Talks on phone: None     Gets together: None     Attends Roman Catholic service: None     Active member of club or organization: None     Attends meetings of clubs or organizations: None     Relationship status: None    Intimate partner violence     Fear of current or ex partner: None     Emotionally abused: None     Physically abused: None     Forced sexual activity: None   Other Topics Concern    None   Social History Narrative    None      Family History:     Family History   Problem Relation Age of Onset    Rheum arthritis Mother     COPD Mother     Seizures Mother     No Known Problems Father     Rheum arthritis Sister     Fibromyalgia Sister     Thyroid disease Sister     Hypertension Maternal Grandmother     Lung cancer Maternal Grandmother     Heart disease Maternal Grandmother     Heart disease Maternal Grandfather     Emphysema Maternal Grandfather     Breast cancer Maternal Aunt 47    Colon cancer Neg Hx     Ovarian cancer Neg Hx     Cervical cancer Neg Hx     Uterine cancer Neg Hx       Current Medications:     Current Outpatient Medications   Medication Sig Dispense Refill    albuterol (PROVENTIL HFA,VENTOLIN HFA) 90 mcg/act inhaler Inhale 2 puffs every 6 (six) hours as needed for wheezing or shortness of breath 1 Inhaler 5    ascorbic Acid (VITAMIN C) 500 MG CPCR Take 500 mg by mouth daily      busPIRone (BUSPAR) 5 mg tablet TAKE ONE TABLET BY MOUTH THREE TIMES A DAY 90 tablet 0    Cholecalciferol (VITAMIN D-3) 5000 units TABS Take 5,000 Units by mouth      dicyclomine (BENTYL) 20 mg tablet Take 1 tablet (20 mg total) by mouth every 6 (six) hours 30 tablet 3    folic acid (FOLVITE) 1 mg tablet       levothyroxine 200 mcg tablet Take 200 mcg by mouth daily      methotrexate (RHEUMATREX) 2 5 MG tablet Take 12 5 mg by mouth      pantoprazole (PROTONIX) 40 mg tablet Take 1 tablet (40 mg total) by mouth daily 31 tablet 11    sertraline (ZOLOFT) 50 mg tablet Take 1 tablet by mouth daily 30 tablet 0     No current facility-administered medications for this visit  Allergies:     No Known Allergies   Physical Exam:     /80   Pulse 102   Temp (!) 96 8 °F (36 °C)   Resp 16   Ht 5' 7" (1 702 m)   Wt 123 kg (272 lb)   SpO2 97%   BMI 42 60 kg/m²     Physical Exam  Vitals signs and nursing note reviewed  Constitutional:       Appearance: Normal appearance  She is well-developed  She is obese  HENT:      Head: Normocephalic and atraumatic        Right Ear: Tympanic membrane and external ear normal       Left Ear: Tympanic membrane and external ear normal       Nose: Nose normal       Mouth/Throat:      Dentition: Abnormal dentition  Pharynx: No oropharyngeal exudate  Comments: Presence of upper denture  Eyes:      Conjunctiva/sclera: Conjunctivae normal       Pupils: Pupils are equal, round, and reactive to light  Neck:      Musculoskeletal: Normal range of motion and neck supple  Cardiovascular:      Rate and Rhythm: Normal rate and regular rhythm  Heart sounds: Normal heart sounds  No murmur  Pulmonary:      Effort: Pulmonary effort is normal  No respiratory distress  Breath sounds: Normal breath sounds  No stridor  No wheezing or rales  Chest:      Chest wall: No tenderness  Abdominal:      General: Bowel sounds are normal  There is no distension  Palpations: Abdomen is soft  There is no mass  Tenderness: There is no abdominal tenderness  There is no guarding or rebound  Hernia: No hernia is present  Musculoskeletal: Normal range of motion  Skin:     Capillary Refill: Capillary refill takes less than 2 seconds  Neurological:      Mental Status: She is alert and oriented to person, place, and time  Cranial Nerves: No cranial nerve deficit  Psychiatric:         Behavior: Behavior normal          Thought Content:  Thought content normal          Judgment: Judgment normal           Glenn Alan, [address] [facility]

## 2020-09-21 NOTE — ASSESSMENT & PLAN NOTE
To date on dental cleaning  Advised to make an appointment for an eye exam   Influenza and Pneumovax immunization administered today

## 2020-09-21 NOTE — ASSESSMENT & PLAN NOTE
To obtain lipid panel, counseled on the importance of healthy food choices increasing dietary fiber, and daily physical activity

## 2020-09-21 NOTE — ASSESSMENT & PLAN NOTE
Anxiety and depression has improved since initiating sertraline 50 mg and buspirone 5 mg every 8 hours  To continue counseling  Follow-up in 4 months or sooner if needed

## 2020-09-21 NOTE — PATIENT INSTRUCTIONS

## 2020-09-25 ENCOUNTER — SOCIAL WORK (OUTPATIENT)
Dept: BEHAVIORAL/MENTAL HEALTH CLINIC | Facility: CLINIC | Age: 40
End: 2020-09-25
Payer: COMMERCIAL

## 2020-09-25 DIAGNOSIS — F41.8 DEPRESSION WITH ANXIETY: Primary | ICD-10-CM

## 2020-09-25 PROCEDURE — 90834 PSYTX W PT 45 MINUTES: CPT | Performed by: SOCIAL WORKER
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
## 2020-09-28 ENCOUNTER — SOCIAL WORK (OUTPATIENT)
Dept: BEHAVIORAL/MENTAL HEALTH CLINIC | Facility: CLINIC | Age: 40
End: 2020-09-28
Payer: COMMERCIAL

## 2020-09-28 DIAGNOSIS — F43.10 POST TRAUMATIC STRESS DISORDER (PTSD): ICD-10-CM

## 2020-09-28 DIAGNOSIS — F41.8 DEPRESSION WITH ANXIETY: Primary | ICD-10-CM

## 2020-09-28 PROCEDURE — 90834 PSYTX W PT 45 MINUTES: CPT | Performed by: SOCIAL WORKER
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
## 2020-10-02 ENCOUNTER — APPOINTMENT (OUTPATIENT)
Dept: LAB | Facility: HOSPITAL | Age: 40
End: 2020-10-02
Payer: COMMERCIAL

## 2020-10-02 DIAGNOSIS — E66.01 MORBID OBESITY WITH BMI OF 40.0-44.9, ADULT (HCC): ICD-10-CM

## 2020-10-02 LAB
CHOLEST SERPL-MCNC: 180 MG/DL (ref 50–200)
HDLC SERPL-MCNC: 36 MG/DL
LDLC SERPL CALC-MCNC: 115 MG/DL (ref 0–100)
TRIGL SERPL-MCNC: 143 MG/DL

## 2020-10-02 PROCEDURE — 80061 LIPID PANEL: CPT

## 2020-10-05 ENCOUNTER — TELEPHONE (OUTPATIENT)
Dept: FAMILY MEDICINE CLINIC | Facility: CLINIC | Age: 40
End: 2020-10-05

## 2020-10-05 ENCOUNTER — TELEMEDICINE (OUTPATIENT)
Dept: BEHAVIORAL/MENTAL HEALTH CLINIC | Facility: CLINIC | Age: 40
End: 2020-10-05
Payer: COMMERCIAL

## 2020-10-05 DIAGNOSIS — F43.10 POST TRAUMATIC STRESS DISORDER (PTSD): ICD-10-CM

## 2020-10-05 DIAGNOSIS — F41.8 DEPRESSION WITH ANXIETY: Primary | ICD-10-CM

## 2020-10-05 PROCEDURE — 90832 PSYTX W PT 30 MINUTES: CPT | Performed by: SOCIAL WORKER

## 2020-10-11 DIAGNOSIS — F41.8 DEPRESSION WITH ANXIETY: ICD-10-CM

## 2020-10-12 RX ORDER — BUSPIRONE HYDROCHLORIDE 5 MG/1
TABLET ORAL
Qty: 90 TABLET | Refills: 3 | Status: SHIPPED | OUTPATIENT
Start: 2020-10-12 | End: 2021-01-21 | Stop reason: SDUPTHER

## 2020-10-14 DIAGNOSIS — F41.8 DEPRESSION WITH ANXIETY: ICD-10-CM

## 2020-10-27 ENCOUNTER — TELEPHONE (OUTPATIENT)
Dept: PULMONOLOGY | Facility: CLINIC | Age: 40
End: 2020-10-27

## 2020-11-10 DIAGNOSIS — F41.8 DEPRESSION WITH ANXIETY: ICD-10-CM

## 2020-11-17 ENCOUNTER — DOCUMENTATION (OUTPATIENT)
Dept: BEHAVIORAL/MENTAL HEALTH CLINIC | Facility: CLINIC | Age: 40
End: 2020-11-17

## 2020-11-17 DIAGNOSIS — F41.8 DEPRESSION WITH ANXIETY: ICD-10-CM

## 2020-11-17 DIAGNOSIS — F43.10 POST TRAUMATIC STRESS DISORDER (PTSD): Primary | ICD-10-CM

## 2020-11-23 ENCOUNTER — TRANSCRIBE ORDERS (OUTPATIENT)
Dept: MAMMOGRAPHY | Facility: CLINIC | Age: 40
End: 2020-11-23

## 2020-11-23 DIAGNOSIS — R92.8 MAMMOGRAM ABNORMAL: Primary | ICD-10-CM

## 2020-11-24 ENCOUNTER — HOSPITAL ENCOUNTER (OUTPATIENT)
Dept: ULTRASOUND IMAGING | Facility: CLINIC | Age: 40
Discharge: HOME | End: 2020-11-24
Payer: COMMERCIAL

## 2020-11-24 ENCOUNTER — TRANSCRIBE ORDERS (OUTPATIENT)
Dept: MAMMOGRAPHY | Facility: CLINIC | Age: 40
End: 2020-11-24

## 2020-11-24 DIAGNOSIS — R92.8 MAMMOGRAM ABNORMAL: ICD-10-CM

## 2020-11-24 DIAGNOSIS — R92.8 ABNORMAL MAMMOGRAM: Primary | ICD-10-CM

## 2020-11-24 PROCEDURE — 76642 ULTRASOUND BREAST LIMITED: CPT

## 2020-12-23 ENCOUNTER — TELEPHONE (OUTPATIENT)
Dept: FAMILY MEDICINE CLINIC | Facility: CLINIC | Age: 40
End: 2020-12-23

## 2021-01-04 ENCOUNTER — APPOINTMENT (OUTPATIENT)
Dept: LAB | Facility: HOSPITAL | Age: 41
End: 2021-01-04
Payer: COMMERCIAL

## 2021-01-21 ENCOUNTER — TELEMEDICINE (OUTPATIENT)
Dept: FAMILY MEDICINE CLINIC | Facility: CLINIC | Age: 41
End: 2021-01-21
Payer: COMMERCIAL

## 2021-01-21 VITALS — OXYGEN SATURATION: 98 % | HEART RATE: 76 BPM

## 2021-01-21 DIAGNOSIS — F41.8 DEPRESSION WITH ANXIETY: ICD-10-CM

## 2021-01-21 DIAGNOSIS — G47.33 OSA (OBSTRUCTIVE SLEEP APNEA): ICD-10-CM

## 2021-01-21 DIAGNOSIS — K21.9 GASTROESOPHAGEAL REFLUX DISEASE WITHOUT ESOPHAGITIS: ICD-10-CM

## 2021-01-21 DIAGNOSIS — M06.9 RHEUMATOID ARTHRITIS INVOLVING MULTIPLE SITES, UNSPECIFIED WHETHER RHEUMATOID FACTOR PRESENT (HCC): Primary | ICD-10-CM

## 2021-01-21 DIAGNOSIS — C73 THYROID CANCER (HCC): ICD-10-CM

## 2021-01-21 PROBLEM — Z00.00 ANNUAL PHYSICAL EXAM: Status: RESOLVED | Noted: 2020-09-21 | Resolved: 2021-01-21

## 2021-01-21 PROCEDURE — 1036F TOBACCO NON-USER: CPT | Performed by: NURSE PRACTITIONER

## 2021-01-21 PROCEDURE — 99214 OFFICE O/P EST MOD 30 MIN: CPT | Performed by: NURSE PRACTITIONER

## 2021-01-21 RX ORDER — BUSPIRONE HYDROCHLORIDE 5 MG/1
5 TABLET ORAL 3 TIMES DAILY
Qty: 90 TABLET | Refills: 3 | Status: SHIPPED | OUTPATIENT
Start: 2021-01-21 | End: 2021-02-15

## 2021-01-21 NOTE — ASSESSMENT & PLAN NOTE
Patient has been without CPAP since hospitalization due to COVID about 6 months ago  Will have office staff reach out to [facility] and patient's pulmonologist as well to help facilitate her obtaining new CPAP

## 2021-01-21 NOTE — PROGRESS NOTES
Virtual Regular Visit      Assessment/Plan:    Problem List Items Addressed This Visit        Digestive    Gastroesophageal reflux disease without esophagitis     GERD stable  To continue pantoprazole 40 mg daily  Endocrine    Thyroid cancer ([facility] [address] )     To continue current dose of levothyroxine 200 mcg  To keep upcoming appointment with endocrinologist on 03/22/2021  Encouraged patient to ensure she obtains diagnostic testing prior to appointment as ordered  Relevant Medications    methotrexate 2 5 mg tablet       Respiratory    NEO (obstructive sleep apnea)     Patient has been without CPAP since hospitalization due to COVID about 6 months ago  Will have office staff reach out to [facility] and patient's pulmonologist as well to help facilitate her obtaining new CPAP  Musculoskeletal and Integument    Rheumatoid arthritis involving multiple sites ([facility] [address] ) - Primary     To continue current dose of methotrexate and folic acid  To continue care with Rheumatology  Other    Depression with anxiety     Due to recent passing of patient's mother and increase in anxiety will have patient increase sertraline to 75 mg daily and continue buspirone 5 mg every 8 hours  Encouraged patient to resume counseling  Follow-up in 3 months or sooner if needed  Relevant Medications    busPIRone (BUSPAR) 5 mg tablet    sertraline (ZOLOFT) 50 mg tablet               Reason for visit is   Chief Complaint   Patient presents with    Virtual Regular Visit        Encounter provider Alonso Hendrix [facility]     Provider located at [facility] [address] [address] [phone]      Recent Visits  No visits were found meeting these conditions     Showing recent visits within past 7 days and meeting all other requirements     Today's Visits  Date Type Provider Dept   01/21/21 Telemedicine Lucio Roa [facility] 8 today's visits and meeting all other requirements     Future Appointments  No visits were found meeting these conditions  Showing future appointments within next 150 days and meeting all other requirements        The patient was identified by name and date of birth  Alfonso Benjamin was informed that this is a telemedicine visit and that the visit is being conducted through [address] [place] [address] and patient was informed that this is not a secure, HIPAA-compliant platform  She agrees to proceed     My office door was closed  No one else was in the room  She acknowledged consent and understanding of privacy and security of the video platform  The patient has agreed to participate and understands they can discontinue the visit at any time  Patient is aware this is a billable service  Subjective  Alfonso Benjamin is a 38 y o  female       Montserrat Puri presents for a follow-up via virtual visit  She is currently in [place] as her mother passed away from [facility] last week  She plans on returning home tomorrow  Since this time, Montserrat Puri has noted an increase in anxiety and difficulty sleeping  She is taking sertraline 50 mg daily and buspirone 5 mg every 8 hours  Montserrat Puri no longer is attending counseling  In regards to her sleep apnea, she has been without her CPAP since she was hospitalized due to COVID about 6 months ago  She has been trying to obtain her CPAP but has had difficulty  In regards to her rheumatoid arthritis, Montserrat Puri has had more flares than usual, she has been taking her methotrexate and folic acid as prescribed by her rheumatologist   In regards to her history of thyroid cancer and thyroidectomy, she has been taking her levothyroxine to 100 mcg daily  Montserrat Puri has an upcoming appointment with endocrinologist on 03/22/2021         Past Medical History:   Diagnosis Date    Arthritis     RA    Colon polyp     Sleep apnea     Thyroid cancer ([facility] [address] )     Varicella Past Surgical History:   Procedure Laterality Date    CHOLECYSTECTOMY      COLONOSCOPY      KNEE SURGERY Left     ACL repair     KNEE SURGERY      SKIN GRAFT Right     thumb    THYROIDECTOMY         Current Outpatient Medications   Medication Sig Dispense Refill    methotrexate 2 5 mg tablet Take 12 5 mg by mouth      albuterol (PROVENTIL HFA,VENTOLIN HFA) 90 mcg/act inhaler Inhale 2 puffs every 6 (six) hours as needed for wheezing or shortness of breath 1 Inhaler 5    ascorbic Acid (VITAMIN C) 500 MG CPCR Take 500 mg by mouth daily      busPIRone (BUSPAR) 5 mg tablet Take 1 tablet (5 mg total) by mouth 3 (three) times a day 90 tablet 3    Cholecalciferol (VITAMIN D-3) 5000 units TABS Take 5,000 Units by mouth      dicyclomine (BENTYL) 20 mg tablet Take 1 tablet (20 mg total) by mouth every 6 (six) hours 30 tablet 3    folic acid (FOLVITE) 1 mg tablet       levothyroxine 200 mcg tablet Take 200 mcg by mouth daily      methotrexate (RHEUMATREX) 2 5 MG tablet Take 12 5 mg by mouth once a week      pantoprazole (PROTONIX) 40 mg tablet Take 1 tablet (40 mg total) by mouth daily 31 tablet 11    sertraline (ZOLOFT) 50 mg tablet Take 1 1/2 tabs daily 45 tablet 3     No current facility-administered medications for this visit  No Known Allergies    Review of Systems   Constitutional: Negative  Respiratory: Negative  Cardiovascular: Negative  Gastrointestinal: Negative  Musculoskeletal: Positive for arthralgias  Neurological: Negative  Psychiatric/Behavioral: Positive for sleep disturbance  Negative for suicidal ideas  The patient is nervous/anxious  Video Exam    Vitals:    01/21/21 0755   Pulse: 76   SpO2: 98%       Physical Exam  Vitals signs and nursing note reviewed  Constitutional:       Appearance: Normal appearance  HENT:      Head: Normocephalic and atraumatic  Neck:      Musculoskeletal: Neck supple     Pulmonary:      Effort: Pulmonary effort is normal  Neurological:      Mental Status: She is alert and oriented to person, place, and time  Psychiatric:         Mood and Affect: Mood normal          Behavior: Behavior normal          Thought Content: Thought content normal          Judgment: Judgment normal           I spent 15 minutes directly with the patient during this visit      VIRTUAL VISIT DISCLAIMER    Nellie Serrano acknowledges that she has consented to an online visit or consultation  She understands that the online visit is based solely on information provided by her, and that, in the absence of a face-to-face physical evaluation by the physician, the diagnosis she receives is both limited and provisional in terms of accuracy and completeness  This is not intended to replace a full medical face-to-face evaluation by the physician  Nellie Serrano understands and accepts these terms

## 2021-01-21 NOTE — ASSESSMENT & PLAN NOTE
Due to recent passing of patient's mother and increase in anxiety will have patient increase sertraline to 75 mg daily and continue buspirone 5 mg every 8 hours  Encouraged patient to resume counseling  Follow-up in 3 months or sooner if needed

## 2021-01-21 NOTE — ASSESSMENT & PLAN NOTE
To continue current dose of levothyroxine 200 mcg  To keep upcoming appointment with endocrinologist on 03/22/2021  Encouraged patient to ensure she obtains diagnostic testing prior to appointment as ordered

## 2021-01-29 ENCOUNTER — OFFICE VISIT (OUTPATIENT)
Dept: PULMONOLOGY | Facility: CLINIC | Age: 41
End: 2021-01-29
Payer: COMMERCIAL

## 2021-01-29 VITALS
HEART RATE: 85 BPM | DIASTOLIC BLOOD PRESSURE: 76 MMHG | WEIGHT: 271 LBS | BODY MASS INDEX: 42.53 KG/M2 | HEIGHT: 67 IN | SYSTOLIC BLOOD PRESSURE: 122 MMHG | OXYGEN SATURATION: 97 % | TEMPERATURE: 97.7 F

## 2021-01-29 DIAGNOSIS — G47.19 EXCESSIVE DAYTIME SLEEPINESS: ICD-10-CM

## 2021-01-29 DIAGNOSIS — E66.01 MORBID OBESITY WITH BMI OF 40.0-44.9, ADULT (HCC): ICD-10-CM

## 2021-01-29 DIAGNOSIS — G47.33 OSA (OBSTRUCTIVE SLEEP APNEA): Primary | ICD-10-CM

## 2021-01-29 PROCEDURE — 3008F BODY MASS INDEX DOCD: CPT | Performed by: NURSE PRACTITIONER

## 2021-01-29 PROCEDURE — 99213 OFFICE O/P EST LOW 20 MIN: CPT | Performed by: PHYSICIAN ASSISTANT

## 2021-01-30 NOTE — PROGRESS NOTES
Assessment/Plan:   Diagnoses and all orders for this visit:    NEO (obstructive sleep apnea)  -     Home Study; Future    Excessive daytime sleepiness    Morbid obesity with BMI of 40 0-44 9, adult [facility])     Patient is here today for follow-up  She was diagnosed with mild sleep apnea in  July 2019  She had been started on the CPAP but there were issues with compliance, she had trouble adjusting to the CPAP for several months  The [employer] took back her CPAP  She has been noting increased daytime sleepiness, [place] sleepiness score is 10  Also having nighttime awakenings and snoring  At this point she does need a new sleep study to obtain a new CPAP  She had been noting improvement in her sleep as well as decreased daytime sleepiness as she was able to adjust to the CPAP  Will reorder her a home sleep study  Once we have the repeat sleep study we can order her a new CPAP  She will follow-up with us in 3 months or sooner if necessary  Return in about 3 months (around 4/29/2021)  All questions are answered to the patient's satisfaction and understanding  She verbalizes understanding  She is encouraged to call with any further questions or concerns  Portions of the record may have been created with voice recognition software  Occasional wrong word or "sound a like" substitutions may have occurred due to the inherent limitations of voice recognition software  Read the chart carefully and recognize, using context, where substitutions have occurred  Electronically Signed by Mahi Méndez PA-C    ______________________________________________________________________    Chief Complaint:   Chief Complaint   Patient presents with    Follow-up    Sleep Apnea       Patient ID: Antonieta Askew is a 38 y o  y o  female has a past medical history of Arthritis, Colon polyp, NEO (obstructive sleep apnea), Sleep apnea, Thyroid cancer ([facility] [address] and Varicella      1/29/2021  Patient presents today for follow-up visit  Patient is a 50-year-old female with past medical history of thyroid cancer, RA, mild sleep apnea not currently on CPAP  There were some issues with compliance and she had to return her CPAP to the [employer] company  She is here today for follow-up  She would like to be restarted on a CPAP  She did note better sleep and decreased daytime sleepiness once she was able to get used to the CPAP  She will need a new sleep study  Review of Systems   Constitutional: Positive for fatigue  HENT: Negative  Respiratory: Negative  Cardiovascular: Negative  Gastrointestinal: Negative  Genitourinary: Negative  Musculoskeletal: Negative  Skin: Negative  Allergic/Immunologic: Negative  Neurological: Negative  Psychiatric/Behavioral: Negative  Smoking history: She reports that she quit smoking about 5 years ago   She has never used smokeless tobacco     The following portions of the patient's history were reviewed and updated as appropriate: allergies, current medications, past family history, past medical history, past social history, past surgical history and problem list     Immunization History   Administered Date(s) Administered    Influenza, recombinant, quadrivalent,injectable, preservative free 12/10/2018    Pneumococcal Polysaccharide PPV23 09/21/2020    Tdap 12/10/2018     Current Outpatient Medications   Medication Sig Dispense Refill    albuterol (PROVENTIL HFA,VENTOLIN HFA) 90 mcg/act inhaler Inhale 2 puffs every 6 (six) hours as needed for wheezing or shortness of breath 1 Inhaler 5    ascorbic Acid (VITAMIN C) 500 MG CPCR Take 500 mg by mouth daily      busPIRone (BUSPAR) 5 mg tablet Take 1 tablet (5 mg total) by mouth 3 (three) times a day 90 tablet 3    Cholecalciferol (VITAMIN D-3) 5000 units TABS Take 5,000 Units by mouth      folic acid (FOLVITE) 1 mg tablet       levothyroxine 200 mcg tablet Take 200 mcg by mouth daily      methotrexate (RHEUMATREX) 2 5 MG tablet Take 12 5 mg by mouth once a week      methotrexate 2 5 mg tablet Take 12 5 mg by mouth      pantoprazole (PROTONIX) 40 mg tablet Take 1 tablet (40 mg total) by mouth daily 31 tablet 11    sertraline (ZOLOFT) 50 mg tablet Take 1 1/2 tabs daily 45 tablet 3    dicyclomine (BENTYL) 20 mg tablet Take 1 tablet (20 mg total) by mouth every 6 (six) hours 30 tablet 3     No current facility-administered medications for this visit  Allergies: Patient has no known allergies  Objective:  Vitals:    01/29/21 1300   BP: 122/76   Pulse: 85   Temp: 97 7 °F (36 5 °C)   SpO2: 97%   Weight: 123 kg (271 lb)   Height: 5' 7" (1 702 m)   Oxygen Therapy  SpO2: 97 %    Wt Readings from Last 3 Encounters:   01/29/21 123 kg (271 lb)   09/21/20 123 kg (272 lb)   08/20/20 123 kg (272 lb)     Body mass index is 42 44 kg/m²  Physical Exam  Vitals signs reviewed  Constitutional:       General: She is not in acute distress  Appearance: Normal appearance  She is obese  She is not ill-appearing  HENT:      Head: Normocephalic and atraumatic  Eyes:      Pupils: Pupils are equal, round, and reactive to light  Neck:      Musculoskeletal: Normal range of motion  Cardiovascular:      Rate and Rhythm: Normal rate and regular rhythm  Pulmonary:      Effort: Pulmonary effort is normal  No respiratory distress  Breath sounds: Normal breath sounds  No wheezing, rhonchi or rales  Abdominal:      General: Abdomen is flat  There is no distension  Musculoskeletal: Normal range of motion  Right lower leg: No edema  Left lower leg: No edema  Skin:     General: Skin is warm and dry  Coloration: Skin is not pale  Neurological:      Mental Status: She is alert and oriented to person, place, and time           Lab Review:   Lab Results   Component Value Date    K 3 9 01/04/2021     01/04/2021    CO2 25 01/04/2021    BUN 14 01/04/2021    CREATININE 0 85 01/04/2021    CALCIUM 8 4 01/04/2021       Diagnostics:  I have personally reviewed pertinent reports  Reviewed prior sleep study  Office Spirometry Results:     ESS: Total score: 10  No results found

## 2021-02-04 ENCOUNTER — OFFICE VISIT (OUTPATIENT)
Dept: GASTROENTEROLOGY | Facility: CLINIC | Age: 41
End: 2021-02-04
Payer: COMMERCIAL

## 2021-02-04 VITALS
SYSTOLIC BLOOD PRESSURE: 112 MMHG | HEART RATE: 82 BPM | DIASTOLIC BLOOD PRESSURE: 82 MMHG | WEIGHT: 272.8 LBS | BODY MASS INDEX: 42.82 KG/M2 | HEIGHT: 67 IN

## 2021-02-04 DIAGNOSIS — K62.5 GASTROINTESTINAL HEMORRHAGE ASSOCIATED WITH ANORECTAL SOURCE: ICD-10-CM

## 2021-02-04 DIAGNOSIS — K58.0 IRRITABLE BOWEL SYNDROME WITH DIARRHEA: Primary | ICD-10-CM

## 2021-02-04 DIAGNOSIS — R10.13 DYSPEPSIA: ICD-10-CM

## 2021-02-04 PROCEDURE — 99213 OFFICE O/P EST LOW 20 MIN: CPT | Performed by: PHYSICIAN ASSISTANT

## 2021-02-04 RX ORDER — PANTOPRAZOLE SODIUM 40 MG/1
40 TABLET, DELAYED RELEASE ORAL DAILY
Qty: 31 TABLET | Refills: 11 | Status: SHIPPED | OUTPATIENT
Start: 2021-02-04 | End: 2022-02-14

## 2021-02-04 RX ORDER — DICYCLOMINE HCL 20 MG
20 TABLET ORAL EVERY 6 HOURS PRN
Qty: 60 TABLET | Refills: 2 | Status: SHIPPED | OUTPATIENT
Start: 2021-02-04 | End: 2021-08-23 | Stop reason: ALTCHOICE

## 2021-02-04 NOTE — PROGRESS NOTES
[facility] - Outpatient Follow-up Note  Aleksander Mccauley 38 y o  female MRN: [id number]  Encounter: [id number]          ASSESSMENT AND PLAN:      1  Irritable bowel syndrome with diarrhea  Trial of Xifaxan   Continue Dicyclomine prn  Continue fiber and probiotic  She is under a great deal of stress as her mom just passed away from COVID-19    2  Dyspepsia  Continue Pantoprazole 40mg daily    3  Gastrointestinal hemorrhage associated with anorectal source  BRBPR x 1 day last Thursday  CBC and CT in the ER were normal  This is recurrent although last episode was several years ago   - Colonoscopy in 2019 showed 2 hyperplastic rectal polyps  Will hold on repeat exam    4  Liver lesion  CT suggested 2 lesions in the liver - will obtain report  She had a hemangioma documented on CT in   ? Need for MRI as per CT report         ______________________________________________________________________    SUBJECTIVE:  50-year-old female with diarrhea predominant irritable bowel syndrome and GERD presents for evaluation of recurrent rectal bleeding  She reports that over the past month her IBS has been out of control  She is having daily abdominal pain, bloating, diarrhea, poor appetite nausea  She has been under a great deal of stress as her mom was diagnosed with and passed away from COVID-19 on   Her  services this Saturday  She is continuing on pantoprazole 40 mg daily and dicyclomine as needed  She is trying to eat a bland diet  Last Thursday she had an episode of rectal bleeding  She reports that initially it appeared to be dark red blood mixed in the stool  She had 2 other episodes which were increasing in amount and so she went to the [facility] Room  CT and labs were relatively unremarkable  She was not anemic  The ER report documents to benign lesions noted in the liver for which follow-up MRI was recommended    Unfortunately, I do not have the CT report available to me  In 2019 she had a CT of the abdomen pelvis with contrast that documented a  Have angioma of the right hepatic lobe  Unfortunately, size was not measured  She denies any recurrent episodes of rectal bleeding since that time  Her last colonoscopy was in 2019 at which time she was reporting rectal bleeding  Two hyperplastic rectal polyps were removed  Prior to that in [place] she had a gastroenterologist that she follows with routinely  She had been describing recurrent episodes of rectal bleeding  She had 1 episode which was as heavy as last Thursday which time a colonoscopy was performed with 2 adenomatous polyps removed  REVIEW OF SYSTEMS IS OTHERWISE NEGATIVE        Historical Information   Past Medical History:   Diagnosis Date    Arthritis     RA    Colon polyp     NEO (obstructive sleep apnea)     Sleep apnea     Thyroid cancer ([address] )     Varicella      Past Surgical History:   Procedure Laterality Date    CHOLECYSTECTOMY      COLONOSCOPY      KNEE SURGERY Left     ACL repair     KNEE SURGERY      SKIN GRAFT Right     thumb    THYROIDECTOMY       Social History   Social History     Substance and Sexual Activity   Alcohol Use Yes    Frequency: 2-4 times a month    Comment: socially     Social History     Substance and Sexual Activity   Drug Use Never     Social History     Tobacco Use   Smoking Status Former Smoker    Quit date: 2015    Years since quittin 5   Smokeless Tobacco Never Used   Tobacco Comment    quit 5 years      Family History   Problem Relation Age of Onset    Rheum arthritis Mother     COPD Mother     Seizures Mother     No Known Problems Father     Rheum arthritis Sister     Fibromyalgia Sister     Thyroid disease Sister     Hypertension Maternal Grandmother     Lung cancer Maternal Grandmother     Heart disease Maternal Grandmother     Heart disease Maternal Grandfather     Emphysema Maternal Grandfather     Breast cancer Maternal Aunt 47    Colon cancer Neg Hx     Ovarian cancer Neg Hx     Cervical cancer Neg Hx     Uterine cancer Neg Hx        Meds/Allergies       Current Outpatient Medications:     albuterol (PROVENTIL HFA,VENTOLIN HFA) 90 mcg/act inhaler    ascorbic Acid (VITAMIN C) 500 MG CPCR    busPIRone (BUSPAR) 5 mg tablet    Cholecalciferol (VITAMIN D-3) 5000 units TABS    folic acid (FOLVITE) 1 mg tablet    levothyroxine 200 mcg tablet    methotrexate (RHEUMATREX) 2 5 MG tablet    pantoprazole (PROTONIX) 40 mg tablet    sertraline (ZOLOFT) 50 mg tablet    dicyclomine (BENTYL) 20 mg tablet    methotrexate 2 5 mg tablet    rifaximin (XIFAXAN) 550 mg tablet    No Known Allergies        Objective     Blood pressure 112/82, pulse 82, height 5' 7" (1 702 m), weight 124 kg (272 lb 12 8 oz)  Body mass index is 42 73 kg/m²  PHYSICAL EXAM:      General Appearance:   Alert, cooperative, no distress   HEENT:   Normocephalic, atraumatic, anicteric      Neck:  Supple, symmetrical, trachea midline   Lungs:   Clear to auscultation bilaterally; no rales, rhonchi or wheezing; respirations unlabored    Heart[de-identified]   Regular rate and rhythm; no murmur, rub, or gallop  Abdomen:   Soft, non-tender, non-distended; normal bowel sounds; no masses, no organomegaly    Genitalia:   Deferred    Rectal:   Deferred    Extremities:  No cyanosis, clubbing or edema    Pulses:  2+ and symmetric    Skin:  No jaundice, rashes, or lesions    Lymph nodes:  No palpable cervical lymphadenopathy        Lab Results:   No visits with results within 1 Day(s) from this visit  Latest known visit with results is:    Ancillary Orders on 11/27/2020   Component Date Value    WBC 01/04/2021 7 28     RBC 01/04/2021 4 59     Hemoglobin 01/04/2021 13 2     Hematocrit 01/04/2021 41 2     MCV 01/04/2021 90     MCH 01/04/2021 28 8     MCHC 01/04/2021 32 0     RDW 01/04/2021 13 1     MPV 01/04/2021 9 6     Platelets 01/04/2021 364     nRBC 01/04/2021 0     Neutrophils Relative 01/04/2021 73     Immat GRANS % 01/04/2021 0     Lymphocytes Relative 01/04/2021 19     Monocytes Relative 01/04/2021 6     Eosinophils Relative 01/04/2021 1     Basophils Relative 01/04/2021 1     Neutrophils Absolute 01/04/2021 5 35     Immature Grans Absolute 01/04/2021 0 02     Lymphocytes Absolute 01/04/2021 1 35     Monocytes Absolute 01/04/2021 0 41     Eosinophils Absolute 01/04/2021 0 10     Basophils Absolute 01/04/2021 0 05     Sodium 01/04/2021 139     Potassium 01/04/2021 3 9     Chloride 01/04/2021 104     CO2 01/04/2021 25     ANION GAP 01/04/2021 10     BUN 01/04/2021 14     Creatinine 01/04/2021 0 85     Glucose, Fasting 01/04/2021 121*    Calcium 01/04/2021 8 4     AST 01/04/2021 15     ALT 01/04/2021 27     Alkaline Phosphatase 01/04/2021 41*    Total Protein 01/04/2021 7 0     Albumin 01/04/2021 3 5     Total Bilirubin 01/04/2021 0 40     eGFR 01/04/2021 85     CRP 01/04/2021 13 7*    Sed Rate 01/04/2021 21*         Radiology Results:   No results found

## 2021-02-04 NOTE — TELEPHONE ENCOUNTER
Kamala pt-  The medication that was prescribed today is too costly $1000 00  Kelly Serum There was mention of samples being available     Please phone [phone]

## 2021-02-15 DIAGNOSIS — F41.8 DEPRESSION WITH ANXIETY: ICD-10-CM

## 2021-02-15 RX ORDER — BUSPIRONE HYDROCHLORIDE 5 MG/1
TABLET ORAL
Qty: 90 TABLET | Refills: 3 | Status: SHIPPED | OUTPATIENT
Start: 2021-02-15 | End: 2021-06-03 | Stop reason: DRUGHIGH

## 2021-02-26 ENCOUNTER — TELEPHONE (OUTPATIENT)
Dept: SLEEP CENTER | Facility: CLINIC | Age: 41
End: 2021-02-26

## 2021-02-26 NOTE — TELEPHONE ENCOUNTER
----- Message from Damari Rod MD sent at 2/25/2021 10:40 AM EST -----  Approved  ----- Message -----  From: Sandy Guillen  Sent: 8/6/9720  10:15 AM EST  To: Sleep Medicine [facility] Provider    This sleep study needs approval      If approved please sign and return to clerical pool  If denied please include reasons why  Also provide alternative testing if warranted  Please sign and return to clerical pool

## 2021-03-08 ENCOUNTER — OFFICE VISIT (OUTPATIENT)
Dept: GASTROENTEROLOGY | Facility: CLINIC | Age: 41
End: 2021-03-08
Payer: COMMERCIAL

## 2021-03-08 VITALS
HEART RATE: 87 BPM | BODY MASS INDEX: 42.97 KG/M2 | SYSTOLIC BLOOD PRESSURE: 110 MMHG | HEIGHT: 67 IN | DIASTOLIC BLOOD PRESSURE: 78 MMHG | WEIGHT: 273.8 LBS

## 2021-03-08 DIAGNOSIS — K58.0 IRRITABLE BOWEL SYNDROME WITH DIARRHEA: ICD-10-CM

## 2021-03-08 DIAGNOSIS — K21.9 GASTROESOPHAGEAL REFLUX DISEASE, UNSPECIFIED WHETHER ESOPHAGITIS PRESENT: ICD-10-CM

## 2021-03-08 DIAGNOSIS — K21.9 GASTROESOPHAGEAL REFLUX DISEASE, UNSPECIFIED WHETHER ESOPHAGITIS PRESENT: Primary | ICD-10-CM

## 2021-03-08 DIAGNOSIS — K76.9 LIVER LESION: Primary | ICD-10-CM

## 2021-03-08 PROCEDURE — 99213 OFFICE O/P EST LOW 20 MIN: CPT | Performed by: PHYSICIAN ASSISTANT

## 2021-03-08 PROCEDURE — 3008F BODY MASS INDEX DOCD: CPT | Performed by: PHYSICIAN ASSISTANT

## 2021-03-08 RX ORDER — METOCLOPRAMIDE 5 MG/1
5 TABLET ORAL 3 TIMES DAILY
Qty: 90 TABLET | Refills: 0 | Status: SHIPPED | OUTPATIENT
Start: 2021-03-08 | End: 2021-04-19

## 2021-03-17 DIAGNOSIS — F41.8 DEPRESSION WITH ANXIETY: ICD-10-CM

## 2021-03-19 ENCOUNTER — TELEPHONE (OUTPATIENT)
Dept: GASTROENTEROLOGY | Facility: CLINIC | Age: 41
End: 2021-03-19

## 2021-03-19 DIAGNOSIS — R19.7 DIARRHEA, UNSPECIFIED TYPE: Primary | ICD-10-CM

## 2021-03-19 RX ORDER — MONTELUKAST SODIUM 4 MG/1
1 TABLET, CHEWABLE ORAL 2 TIMES DAILY
Qty: 60 TABLET | Refills: 3 | Status: SHIPPED | OUTPATIENT
Start: 2021-03-19 | End: 2021-04-19

## 2021-03-19 NOTE — TELEPHONE ENCOUNTER
Spoke to patient  She continues with worsening diarrhea and pain  Colonoscopy in 2019 showed polyps  Recent US was normal except liver lesions - she is scheduled for a f/u mri later this month  Use dicyclomine - called in colestid  Will check stool cultures - orders in chart     She will go to a [facility] lab

## 2021-03-19 NOTE — TELEPHONE ENCOUNTER
Carina Flair - patient called was told to check in if not getting any better  Patient is still experiencing excoriating pain and is not able to eat   Please call Rayne Sanderson at [phone] ty

## 2021-03-22 ENCOUNTER — APPOINTMENT (OUTPATIENT)
Dept: LAB | Facility: HOSPITAL | Age: 41
End: 2021-03-22
Payer: COMMERCIAL

## 2021-03-22 DIAGNOSIS — R19.7 DIARRHEA, UNSPECIFIED TYPE: ICD-10-CM

## 2021-03-22 PROCEDURE — 83631 LACTOFERRIN FECAL (QUANT): CPT

## 2021-03-25 LAB — LACTOFERRIN SER-MCNC: <1 UG/ML(G) (ref 0–7.24)

## 2021-03-26 ENCOUNTER — HOSPITAL ENCOUNTER (OUTPATIENT)
Dept: MRI IMAGING | Facility: HOSPITAL | Age: 41
Discharge: HOME | End: 2021-03-26
Payer: COMMERCIAL

## 2021-03-26 DIAGNOSIS — K76.9 LIVER LESION: ICD-10-CM

## 2021-03-26 PROCEDURE — G1004 CDSM NDSC: HCPCS

## 2021-03-26 PROCEDURE — 74183 MRI ABD W/O CNTR FLWD CNTR: CPT

## 2021-03-26 PROCEDURE — A9585 GADOBUTROL INJECTION: HCPCS | Performed by: PHYSICIAN ASSISTANT

## 2021-03-26 RX ADMIN — GADOBUTROL 12 ML: 604.72 INJECTION INTRAVENOUS at 15:41

## 2021-04-07 ENCOUNTER — TELEPHONE (OUTPATIENT)
Dept: GASTROENTEROLOGY | Facility: CLINIC | Age: 41
End: 2021-04-07

## 2021-04-07 NOTE — TELEPHONE ENCOUNTER
----- Message from Marivel Sheehan PA-C sent at 4/7/2021 10:39 AM EDT -----  Please inform patient that her MRI is stable that she does have a hemangioma in her liver which is nothing to be concerned about and has been stable since 2019    Thank you

## 2021-04-12 ENCOUNTER — HOSPITAL ENCOUNTER (OUTPATIENT)
Dept: SLEEP CENTER | Facility: CLINIC | Age: 41
Discharge: HOME | End: 2021-04-12
Payer: COMMERCIAL

## 2021-04-12 DIAGNOSIS — G47.33 OSA (OBSTRUCTIVE SLEEP APNEA): ICD-10-CM

## 2021-04-12 PROCEDURE — G0399 HOME SLEEP TEST/TYPE 3 PORTA: HCPCS | Performed by: INTERNAL MEDICINE

## 2021-04-12 PROCEDURE — G0399 HOME SLEEP TEST/TYPE 3 PORTA: HCPCS

## 2021-04-12 NOTE — PROGRESS NOTES
Home Sleep Study Documentation    Pre-Sleep Home Study:    Set-up and instructions performed by: Alexis Mendoza    Technician performed demonstration for Patient: yes    Return demonstration performed by Patient: yes    Written instructions provided to Patient: yes    Patient signed consent form: yes        Post-Sleep Home Study:    Additional comments by Patient: None    Home Sleep Study Failed:no:    Failure reason: N/A    Reported or Detected: N/A    Scored by: ART Christian

## 2021-04-19 ENCOUNTER — OFFICE VISIT (OUTPATIENT)
Dept: GASTROENTEROLOGY | Facility: CLINIC | Age: 41
End: 2021-04-19
Payer: COMMERCIAL

## 2021-04-19 VITALS
SYSTOLIC BLOOD PRESSURE: 106 MMHG | HEIGHT: 67 IN | DIASTOLIC BLOOD PRESSURE: 70 MMHG | BODY MASS INDEX: 42.44 KG/M2 | HEART RATE: 70 BPM | WEIGHT: 270.4 LBS

## 2021-04-19 DIAGNOSIS — K21.9 GASTROESOPHAGEAL REFLUX DISEASE, UNSPECIFIED WHETHER ESOPHAGITIS PRESENT: ICD-10-CM

## 2021-04-19 DIAGNOSIS — K58.0 IRRITABLE BOWEL SYNDROME WITH DIARRHEA: Primary | ICD-10-CM

## 2021-04-19 DIAGNOSIS — K76.9 LIVER LESION: ICD-10-CM

## 2021-04-19 PROCEDURE — 99213 OFFICE O/P EST LOW 20 MIN: CPT | Performed by: PHYSICIAN ASSISTANT

## 2021-04-19 NOTE — PROGRESS NOTES
[facility] - Outpatient Follow-up Note  Kehinde Conway 38 y o  female MRN: [id number]  Encounter: [id number]          ASSESSMENT AND PLAN:      1  Irritable bowel syndrome with diarrhea  Her symptoms have improved with a change in diet  She is limiting carbs and fatty foods  She reports 1-2 days per week of diarreha which is manageable  She is off colestid  She uses Dicyclomine prn with some relief    2  Gastroesophageal reflux disease, unspecified whether esophagitis present  Controlled on Pantoprazole    3  Liver lesion  Hemangioms - no further f/u needed    ______________________________________________________________________    SUBJECTIVE:    78-year-old female with diarrhea predominant irritable bowel syndrome and GERD presents for routine follow-up  She reports significant improvement in symptoms with dietary changes  She was recently told that she is a prediabetic and so she has cut back significantly on carbs and high fat foods  She is still having 1-2 days a week of frequent loose stools but this is manageable  She remains on pantoprazole 40 mg daily with good relief in her acid reflux  After her last visit she had a follow-up MRI due to an abnormal ultrasound  This documented liver lesions which were hemangiomas  REVIEW OF SYSTEMS IS OTHERWISE NEGATIVE        Historical Information   Past Medical History:   Diagnosis Date    Arthritis     RA    Colon polyp     NEO (obstructive sleep apnea)     Sleep apnea     Thyroid cancer ([address] )     Varicella      Past Surgical History:   Procedure Laterality Date    CHOLECYSTECTOMY      COLONOSCOPY      KNEE SURGERY Left     ACL repair     KNEE SURGERY      SKIN GRAFT Right     thumb    THYROIDECTOMY       Social History   Social History     Substance and Sexual Activity   Alcohol Use Yes    Frequency: 2-4 times a month    Comment: socially     Social History     Substance and Sexual Activity   Drug Use Never Social History     Tobacco Use   Smoking Status Former Smoker    Quit date: 2015    Years since quittin 7   Smokeless Tobacco Never Used   Tobacco Comment    quit 5 years      Family History   Problem Relation Age of Onset    Rheum arthritis Mother     COPD Mother     Seizures Mother     No Known Problems Father     Rheum arthritis Sister     Fibromyalgia Sister     Thyroid disease Sister     Hypertension Maternal Grandmother     Lung cancer Maternal Grandmother     Heart disease Maternal Grandmother     Heart disease Maternal Grandfather     Emphysema Maternal Grandfather     Breast cancer Maternal Aunt 47    Colon cancer Neg Hx     Ovarian cancer Neg Hx     Cervical cancer Neg Hx     Uterine cancer Neg Hx        Meds/Allergies       Current Outpatient Medications:     albuterol (PROVENTIL HFA,VENTOLIN HFA) 90 mcg/act inhaler    ascorbic Acid (VITAMIN C) 500 MG CPCR    busPIRone (BUSPAR) 5 mg tablet    Cholecalciferol (VITAMIN D-3) 5000 units TABS    dicyclomine (BENTYL) 20 mg tablet    folic acid (FOLVITE) 1 mg tablet    levothyroxine 200 mcg tablet    methotrexate (RHEUMATREX) 2 5 MG tablet    methotrexate 2 5 mg tablet    pantoprazole (PROTONIX) 40 mg tablet    sertraline (ZOLOFT) 50 mg tablet    No Known Allergies        Objective     Blood pressure 106/70, pulse 70, height 5' 7" (1 702 m), weight 123 kg (270 lb 6 4 oz)  Body mass index is 42 35 kg/m²  PHYSICAL EXAM:      General Appearance:   Alert, cooperative, no distress   HEENT:   Normocephalic, atraumatic, anicteric      Neck:  Supple, symmetrical, trachea midline   Lungs:   Clear to auscultation bilaterally; no rales, rhonchi or wheezing; respirations unlabored    Heart[de-identified]   Regular rate and rhythm; no murmur, rub, or gallop     Abdomen:   Soft, non-tender, non-distended; normal bowel sounds; no masses, no organomegaly    Genitalia:   Deferred    Rectal:   Deferred    Extremities:  No cyanosis, clubbing or edema    Pulses:  2+ and symmetric    Skin:  No jaundice, rashes, or lesions    Lymph nodes:  No palpable cervical lymphadenopathy        Lab Results:   No visits with results within 1 Day(s) from this visit  Latest known visit with results is:   Appointment on 03/22/2021   Component Date Value    Lactoferrin, Quant 03/22/2021 <1 00          Radiology Results:   Mri Abdomen W Wo Contrast And Mrcp    Result Date: 4/4/2021  Narrative: MRI OF THE ABDOMEN WITH AND WITHOUT CONTRAST WITH MRCP INDICATION:  Liver lesion COMPARISON: CT of the abdomen/pelvis dated 6/20/2019  TECHNIQUE:  The following pulse sequences were obtained:  Coronal and axial T2 with TE of 90 and 180 respectively, axial T2 with fat saturation, axial FIESTA fat-sat, axial T1-weighted in-and-out-of phase, axial DWI/ADC, pre-contrast axial T1 with fat saturation, post-contrast dynamic axial T1 with fat saturation at 20, 70, and 180 seconds, followed by coronal and 7 minute delayed axial T1 with fat saturation  3D MRCP images were obtained with radial thick slabs and projections  3D rendering was performed from the acquisition scanner  IV Contrast:  12 mL of Gadobutrol injection (SINGLE-DOSE) FINDINGS: LOWER CHEST:   Unremarkable  LIVER: Overall size and contour are normal  Background T2 signal is normal and homogeneous  Low-level diffuse signal loss on opposed phase imaging consistent with mild steatosis  No evidence for iron overload  Lobulated mass in segment VII of the right hepatic lobe measuring 2 9 cm x 2 3 cm x 3 8 cm  The mass shows near fluid T2 hyperintensity with puddling, discontinuous, peripheral early enhancement and centripetal fill on delayed phases  Signal and enhancement kinetic characteristics consistent with benign hemangioma  No suspicious hepatic masses  BILE DUCTS:  No intrahepatic or extrahepatic bile duct dilation  Common bile duct is normal in caliber  No choledocholithiasis, biliary stricture or suspicious mass  GALLBLADDER:  Surgically absent  PANCREAS:  Normal  No main pancreatic ductal dilation  ADRENAL GLANDS:  Normal  SPLEEN:  Normal  KIDNEYS/PROXIMAL URETERS:  No hydroureteronephrosis  No suspicious renal mass  No perinephric collections  BOWEL:   Stomach appears normal for level distention  No dilated or inflamed loops of bowel  Normal appendix demonstrated  PERITONEUM/RETROPERITONEUM:  No ascites, encapsulated collections, or soft tissue masses  LYMPH NODES:  No abdominal lymphadenopathy  VASCULAR STRUCTURES:  No aneurysm  ABDOMINAL WALL:  Unremarkable  OSSEOUS STRUCTURES:  No suspicious osseous lesion  Impression: 1  Benign hepatic hemangioma is unchanged since 2019  No suspicious liver masses  2   Mild diffuse hepatic steatosis   Workstation performed: [id number]

## 2021-04-22 ENCOUNTER — OFFICE VISIT (OUTPATIENT)
Dept: FAMILY MEDICINE CLINIC | Facility: CLINIC | Age: 41
End: 2021-04-22
Payer: COMMERCIAL

## 2021-04-22 VITALS
OXYGEN SATURATION: 98 % | DIASTOLIC BLOOD PRESSURE: 80 MMHG | WEIGHT: 270.6 LBS | SYSTOLIC BLOOD PRESSURE: 120 MMHG | BODY MASS INDEX: 42.47 KG/M2 | HEIGHT: 67 IN | RESPIRATION RATE: 14 BRPM | TEMPERATURE: 96.8 F | HEART RATE: 80 BPM

## 2021-04-22 DIAGNOSIS — E89.0 POST-SURGICAL HYPOTHYROIDISM: ICD-10-CM

## 2021-04-22 DIAGNOSIS — G47.33 OSA (OBSTRUCTIVE SLEEP APNEA): ICD-10-CM

## 2021-04-22 DIAGNOSIS — M06.9 RHEUMATOID ARTHRITIS INVOLVING MULTIPLE SITES, UNSPECIFIED WHETHER RHEUMATOID FACTOR PRESENT (HCC): ICD-10-CM

## 2021-04-22 DIAGNOSIS — E66.01 MORBID OBESITY WITH BMI OF 40.0-44.9, ADULT (HCC): ICD-10-CM

## 2021-04-22 DIAGNOSIS — C73 THYROID CANCER (HCC): Primary | ICD-10-CM

## 2021-04-22 DIAGNOSIS — K58.0 IRRITABLE BOWEL SYNDROME WITH DIARRHEA: ICD-10-CM

## 2021-04-22 DIAGNOSIS — R73.01 IMPAIRED FASTING GLUCOSE: ICD-10-CM

## 2021-04-22 DIAGNOSIS — F41.8 DEPRESSION WITH ANXIETY: ICD-10-CM

## 2021-04-22 PROCEDURE — 99215 OFFICE O/P EST HI 40 MIN: CPT | Performed by: NURSE PRACTITIONER

## 2021-04-22 PROCEDURE — 1036F TOBACCO NON-USER: CPT | Performed by: NURSE PRACTITIONER

## 2021-04-22 NOTE — ASSESSMENT & PLAN NOTE
Depression has recently worsened  To continue counseling weekly  Will increase Zoloft from 50 mg to 75 mg daily  Follow-up in 1 month if no improvement in mood or sooner if symptoms worsen  Otherwise to follow-up in 4 months

## 2021-04-22 NOTE — PATIENT INSTRUCTIONS
Weight Management   AMBULATORY CARE:   Why it is important to manage your weight:  Being overweight increases your risk of health conditions such as heart disease, high blood pressure, type 2 diabetes, and certain types of cancer  It can also increase your risk for osteoarthritis, sleep apnea, and other respiratory problems  Aim for a slow, steady weight loss  Even a small amount of weight loss can lower your risk of health problems  How to lose weight safely:  A safe and healthy way to lose weight is to eat fewer calories and get regular exercise  · You can lose up about 1 pound a week by decreasing the number of calories you eat by 500 calories each day  You can decrease calories by eating smaller portion sizes or by cutting out high-calorie foods  Read labels to find out how many calories are in the foods you eat  · You can also burn calories with exercise such as walking, swimming, or biking  You will be more likely to keep weight off if you make these changes part of your lifestyle  Exercise at least 30 minutes per day on most days of the week  You can also fit in more physical activity by taking the stairs instead of the elevator or parking farther away from stores  Ask your healthcare provider about the best exercise plan for you  Healthy meal plan for weight management:  A healthy meal plan includes a variety of foods, contains fewer calories, and helps you stay healthy  A healthy meal plan includes the following:     · Eat whole-grain foods more often  A healthy meal plan should contain fiber  Fiber is the part of grains, fruits, and vegetables that is not broken down by your body  Whole-grain foods are healthy and provide extra fiber in your diet  Some examples of whole-grain foods are whole-wheat breads and pastas, oatmeal, brown rice, and bulgur  · Eat a variety of vegetables every day  Include dark, leafy greens such as spinach, kale, ivette greens, and mustard greens   Eat yellow and orange vegetables such as carrots, sweet potatoes, and winter squash  · Eat a variety of fruits every day  Choose fresh or canned fruit (canned in its own juice or light syrup) instead of juice  Fruit juice has very little or no fiber  · Eat low-fat dairy foods  Drink fat-free (skim) milk or 1% milk  Eat fat-free yogurt and low-fat cottage cheese  Try low-fat cheeses such as mozzarella and other reduced-fat cheeses  · Choose meat and other protein foods that are low in fat  Choose beans or other legumes such as split peas or lentils  Choose fish, skinless poultry (chicken or turkey), or lean cuts of red meat (beef or pork)  Before you cook meat or poultry, cut off any visible fat  · Use less fat and oil  Try baking foods instead of frying them  Add less fat, such as margarine, sour cream, regular salad dressing and mayonnaise to foods  Eat fewer high-fat foods  Some examples of high-fat foods include french fries, doughnuts, ice cream, and cakes  · Eat fewer sweets  Limit foods and drinks that are high in sugar  This includes candy, cookies, regular soda, and sweetened drinks  Ways to decrease calories:   · Eat smaller portions  ? Use a small plate with smaller servings  ? Do not eat second helpings  ? When you eat at a restaurant, ask for a box and place half of your meal in the box before you eat  ? Share an entrée with someone else  · Replace high-calorie snacks with healthy, low-calorie snacks  ? Choose fresh fruit, vegetables, fat-free rice cakes, or air-popped popcorn instead of potato chips, nuts, or chocolate  ? Choose water or calorie-free drinks instead of soda or sweetened drinks  · Do not shop for groceries when you are hungry  You may be more likely to make unhealthy food choices  Take a grocery list of healthy foods and shop after you have eaten  · Eat regular meals  Do not skip meals  Skipping meals can lead to overeating later in the day   This can make it harder for you to lose weight  Eat a healthy snack in place of a meal if you do not have time to eat a regular meal  Talk with a dietitian to help you create a meal plan and schedule that is right for you  Other things to consider as you try to lose weight:   · Be aware of situations that may give you the urge to overeat, such as eating while watching television  Find ways to avoid these situations  For example, read a book, go for a walk, or do crafts  · Meet with a weight loss support group or friends who are also trying to lose weight  This may help you stay motivated to continue working on your weight loss goals  © Copyright [address] Information is for End User's use only and may not be sold, redistributed or otherwise used for commercial purposes  All illustrations and images included in CareNotes® are the copyrighted property of [employer] M [employer] Inc  or [facility] Janina Torres   The above information is an  only  It is not intended as medical advice for individual conditions or treatments  Talk to your doctor, nurse or pharmacist before following any medical regimen to see if it is safe and effective for you  Heart Healthy Diet   AMBULATORY CARE:   A heart healthy diet  is an eating plan low in unhealthy fats and sodium (salt)  The plan is high in healthy fats and fiber  A heart healthy diet helps improve your cholesterol levels and lowers your risk for heart disease and stroke  A dietitian will teach you how to read and understand food labels  Heart healthy diet guidelines to follow:   · Choose foods that contain healthy fats  ? Unsaturated fats  include monounsaturated and polyunsaturated fats  Unsaturated fat is found in foods such as soybean, canola, olive, corn, and safflower oils  It is also found in soft tub margarine that is made with liquid vegetable oil  ? Omega-3 fat  is found in certain fish, such as salmon, tuna, and trout, and in walnuts and flaxseed  Eat fish high in omega-3 fats at least 2 times a week  · Get 20 to 30 grams of fiber each day  Fruits, vegetables, whole-grain foods, and legumes (cooked beans) are good sources of fiber  · Limit or do not have unhealthy fats  ? Cholesterol  is found in animal foods, such as eggs and lobster, and in dairy products made from whole milk  Limit cholesterol to less than 200 mg each day  ? Saturated fat  is found in meats, such as gallo and hamburger  It is also found in chicken or turkey skin, whole milk, and butter  Limit saturated fat to less than 7% of your total daily calories  ? Trans fat  is found in packaged foods, such as potato chips and cookies  It is also in hard margarine, some fried foods, and shortening  Do not eat foods that contain trans fats  · Limit sodium as directed  You may be told to limit sodium to 2,000 to 2,300 mg each day  Choose low-sodium or no-salt-added foods  Add little or no salt to food you prepare  Use herbs and spices in place of salt  Include the following in your heart healthy plan:  Ask your dietitian or healthcare provider how many servings to have from each of the following food groups:  · Grains:      ? Whole-wheat breads, cereals, and pastas, and brown rice    ? Low-fat, low-sodium crackers and chips    · Vegetables:      ? Broccoli, green beans, green peas, and spinach    ? Collards, kale, and lima beans    ? Carrots, sweet potatoes, tomatoes, and peppers    ? Canned vegetables with no salt added    · Fruits:      ? Bananas, peaches, pears, and pineapple    ? Grapes, raisins, and dates    ? Oranges, tangerines, grapefruit, orange juice, and grapefruit juice    ? Apricots, mangoes, melons, and papaya    ? Raspberries and strawberries    ? Canned fruit with no added sugar    · Low-fat dairy:      ? Nonfat (skim) milk, 1% milk, and low-fat almond, cashew, or soy milks fortified with calcium    ?  Low-fat cheese, regular or frozen yogurt, and cottage cheese    · Meats and proteins:      ? Lean cuts of beef and pork (loin, leg, round), skinless chicken and turkey    ? Legumes, soy products, egg whites, or nuts    Limit or do not include the following in your heart healthy plan:   · Unhealthy fats and oils:      ? Whole or 2% milk, cream cheese, sour cream, or cheese    ? High-fat cuts of beef (T-bone steaks, ribs), chicken or turkey with skin, and organ meats such as liver    ? Butter, stick margarine, shortening, and cooking oils such as coconut or palm oil    · Foods and liquids high in sodium:      ? Packaged foods, such as frozen dinners, cookies, macaroni and cheese, and cereals with more than 300 mg of sodium per serving    ? Vegetables with added sodium, such as instant potatoes, vegetables with added sauces, or regular canned vegetables    ? Cured or smoked meats, such as hot dogs, gallo, and sausage    ? High-sodium ketchup, barbecue sauce, salad dressing, pickles, olives, soy sauce, or miso    · Foods and liquids high in sugar:      ? Candy, cake, cookies, pies, or doughnuts    ? Soft drinks (soda), sports drinks, or sweetened tea    ? Canned or dry mixes for cakes, soups, sauces, or gravies    Other healthy heart guidelines:   · Do not smoke  Nicotine and other chemicals in cigarettes and cigars can cause lung and heart damage  Ask your healthcare provider for information if you currently smoke and need help to quit  E-cigarettes or smokeless tobacco still contain nicotine  Talk to your healthcare provider before you use these products  · Limit or do not drink alcohol as directed  Alcohol can damage your heart and raise your blood pressure  Your healthcare provider may give you specific daily and weekly limits  The general recommended limit is 1 drink a day for women 21 or older and for men 72 or older  Do not have more than 3 drinks in a day or 7 in a week  The recommended limit is 2 drinks a day for men 24to 59years of age   Do not have more than 4 drinks in a day or 14 in a week  A drink of alcohol is 12 ounces of beer, 5 ounces of wine, or 1½ ounces of liquor  · Exercise regularly  Exercise can help you maintain a healthy weight and improve your blood pressure and cholesterol levels  Regular exercise can also decrease your risk for heart problems  Ask your healthcare provider about the best exercise plan for you  Do not start an exercise program without asking your healthcare provider  Follow up with your doctor or cardiologist as directed:  Write down your questions so you remember to ask them during your visits  © Copyright [address] Information is for End User's use only and may not be sold, redistributed or otherwise used for commercial purposes  All illustrations and images included in CareNotes® are the copyrighted property of [employer]  or [facility] Janina Torres   The above information is an  only  It is not intended as medical advice for individual conditions or treatments  Talk to your doctor, nurse or pharmacist before following any medical regimen to see if it is safe and effective for you  Calorie Counting Diet   WHAT YOU NEED TO KNOW:   What is a calorie counting diet? It is a meal plan based on counting calories each day to reach a healthy body weight  You will need to eat fewer calories if you are trying to lose weight  Weight loss may decrease your risk for certain health problems or improve your health if you have health problems  Some of these health problems include heart disease, high blood pressure, and diabetes  What foods should I avoid? Your dietitian will tell you if you need to avoid certain foods based on your body weight and health condition  You may need to avoid high-fat foods if you are at risk for or have heart disease  You may need to eat fewer foods from the breads and starches food group if you have diabetes  How many calories are in foods?   The following is a list of foods and drinks with the approximate number of calories in each  Check the food label to find the exact number of calories  A dietitian can tell you how many calories you should have from each food group each day  · Carbohydrate:      ? ½ of a 3-inch bagel, 1 slice of bread, or ½ of a hamburger bun or hot dog bun (80)    ? 1 (8-inch) flour tortilla or ½ cup of cooked rice (100)    ? 1 (6-inch) corn tortilla (80)    ? 1 (6-inch) pancake or 1 cup of bran flakes cereal (110)    ? ½ cup of cooked cereal (80)    ? ½ cup of cooked pasta (85)    ? 1 ounce of pretzels (100)    ? 3 cups of air-popped popcorn without butter or oil (80)    · Dairy:      ? 1 cup of skim or 1% milk (90)    ? 1 cup of 2% milk (120)    ? 1 cup of whole milk (160)    ? 1 cup of 2% chocolate milk (220)    ? 1 ounce of low-fat cheese with 3 grams of fat per ounce (70)    ? 1 ounce of cheddar cheese (114)    ? ½ cup of 1% fat cottage cheese (80)    ? 1 cup of plain or sugar-free, fat-free yogurt (90)    · Protein foods:      ? 3 ounces of fish (not breaded or fried) (95)    ? 3 ounces of breaded, fried fish (195)    ? ¾ cup of tuna canned in water (105)    ? 3 ounces of chicken breast without skin (105)    ? 1 fried chicken breast with skin (350)    ? ¼ cup of fat free egg substitute (40)    ? 1 large egg (75)    ? 3 ounces of lean beef or pork (165)    ? 3 ounces of fried pork chop or ham (185)    ? ½ cup of cooked dried beans, such as kidney, muniz, lentils, or navy (115)    ? 3 ounces of bologna or lunch meat (225)    ? 2 links of breakfast sausage (140)    · Vegetables:      ? ½ cup of sliced mushrooms (10)    ? 1 cup of salad greens, such as lettuce, spinach, or fernie (15)    ? ½ cup of steamed asparagus (20)    ? ½ cup of cooked summer squash, zucchini squash, or green or wax beans (25)    ? 1 cup of broccoli or cauliflower florets, or 1 medium tomato (25)    ? 1 large raw carrot or ½ cup of cooked carrots (40)    ?  ? of a medium cucumber or 1 stalk of celery (5)    ? 1 small baked potato (160)    ? 1 cup of breaded, fried vegetables (230)    · Fruit:      ? 1 (6-inch) banana (55)     ? ½ of a 4-inch grapefruit (55)    ? 15 grapes (60)    ? 1 medium orange or apple (70)    ? 1 large peach (65)    ? 1 cup of fresh pineapple chunks (75)    ? 1 cup of melon cubes (50)    ? 1¼ cups of whole strawberries (45)    ? ½ cup of fruit canned in juice (55)    ? ½ cup of fruit canned in heavy syrup (110)    ? ? cup of raisins (130)    ? ½ cup of unsweetened fruit juice (60)    ? ½ cup of grape, cranberry, or prune juice (90)    · Fat:      ? 10 peanuts or 2 teaspoons of peanut butter (55)    ? 2 tablespoons of avocado or 1 tablespoon of regular salad dressing (45)    ? 2 slices of gallo (90)    ? 1 teaspoon of oil, such as safflower, canola, corn, or olive oil (45)    ? 2 teaspoons of low-fat margarine, or 1 tablespoon of low-fat mayonnaise (50)    ? 1 teaspoon of regular margarine (40)    ? 1 tablespoon of regular mayonnaise (135)    ? 1 tablespoon of cream cheese or 2 tablespoons of low-fat cream cheese (45)    ? 2 tablespoons of vegetable shortening (215)    · Dessert and sweets:      ? 8 animal crackers or 5 vanilla wafers (80)    ? 1 frozen fruit juice bar (80)    ? ½ cup of ice milk or low-fat frozen yogurt (90)    ? ½ cup of sherbet or sorbet (125)    ? ½ cup of sugar-free pudding or custard (60)    ? ½ cup of ice cream (140)    ? ½ cup of pudding or custard (175)    ? 1 (2-inch) square chocolate brownie (185)    · Combination foods:      ? Bean burrito made with an 8-inch tortilla, without cheese (275)    ? Chicken breast sandwich with lettuce and tomato (325)    ? 1 cup of chicken noodle soup (60)    ? 1 beef taco (175)    ? Regular hamburger with lettuce and tomato (310)    ? Regular cheeseburger with lettuce and tomato (410)     ? ¼ of a 12-inch cheese pizza (280)    ? Fried fish sandwich with lettuce and tomato (425)    ?  Hot dog and bun (275)    ? 1½ cups of macaroni and cheese (310)    ? Taco salad with a fried tortilla shell (870)    · Low-calorie foods:      ? 1 tablespoon of ketchup or 1 tablespoon of fat free sour cream (15)    ? 1 teaspoon of mustard (5)    ? ¼ cup of salsa (20)    ? 1 large dill pickle (15)    ? 1 tablespoon of fat free salad dressing (10)    ? 2 teaspoons of low-sugar, light jam or jelly, or 1 tablespoon of sugar-free syrup (15)    ? 1 sugar-free popsicle (15)    ? 1 cup of club soda, seltzer water, or diet soda (0)    CARE AGREEMENT:   You have the right to help plan your care  Discuss treatment options with your healthcare provider to decide what care you want to receive  You always have the right to refuse treatment  The above information is an  only  It is not intended as medical advice for individual conditions or treatments  Talk to your doctor, nurse or pharmacist before following any medical regimen to see if it is safe and effective for you  © Copyright [address] Information is for End User's use only and may not be sold, redistributed or otherwise used for commercial purposes   All illustrations and images included in CareNotes® are the copyrighted property of [employer]  or [address] [place] [address]

## 2021-04-22 NOTE — ASSESSMENT & PLAN NOTE
Diarrhea has improved with improvement in diet  To begin probiotic and Benefiber as advised by GI    To continue care with GI

## 2021-04-22 NOTE — ASSESSMENT & PLAN NOTE
To continue methotrexate and folic acid  Encouraged patient to receive the COVID-19 immunization    To continue care with rheumatologist

## 2021-04-22 NOTE — PROGRESS NOTES
Assessment/Plan:    Thyroid cancer ([place] [address] )  To continue care with endocrinologist    Post-surgical hypothyroidism  To continue levothyroxine 200 mcg daily  NEO (obstructive sleep apnea)  To begin wearing CPAP daily  Rheumatoid arthritis involving multiple sites [facility])  To continue methotrexate and folic acid  Encouraged patient to receive the COVID-19 immunization  To continue care with rheumatologist     Morbid obesity with BMI of 40 0-44 9, adult ([place] [address] )  Counseled on the importance of weight reduction, daily physical activity, and healthy diet  Depression with anxiety  Depression has recently worsened  To continue counseling weekly  Will increase Zoloft from 50 mg to 75 mg daily  Follow-up in 1 month if no improvement in mood or sooner if symptoms worsen  Otherwise to follow-up in 4 months  Impaired fasting glucose  Discussed the importance of a low carb diet and daily physical activity  Irritable bowel syndrome with diarrhea  Diarrhea has improved with improvement in diet  To begin probiotic and Benefiber as advised by GI  To continue care with GI  Diagnoses and all orders for this visit:    Thyroid cancer ([place] [address] )    Depression with anxiety  -     sertraline (ZOLOFT) 50 mg tablet; Take 1 1/2 tabs daily    Post-surgical hypothyroidism    NEO (obstructive sleep apnea)    Rheumatoid arthritis involving multiple sites, unspecified whether rheumatoid factor present ([place] [address] )    Morbid obesity with BMI of 40 0-44 9, adult ([facility])    Impaired fasting glucose    Irritable bowel syndrome with diarrhea          Subjective:      Patient ID: Floyd Easton is a 38 y o  female  Cintia presents for a follow-up  She notes a recent worsening of her depression since her mother suddenly passed from COVID-23  She recently had a conflict with her father shortly after her mother's death  Cintia will have good days and bad days, but at times she has difficulty getting out of bed    She has been taking buspirone 5 mg every 8 hours and her Zoloft 50 mg daily  She recently resumed counseling  She continues to see the endocrinologist due to her history of thyroid cancer  Her blood sugars recently noted to be elevated  Since this time, Anika Bertrand has been trying to change her diet, reduce carbs, and started walking daily  In regards to her IBS, her diarrhea has greatly improved since she has been consuming a  diet  She tries to avoid junk food  Becca plans on starting a probiotic and Benefiber as advised by GI  She is in the process of obtaining a CPAP due to her sleep apnea    In regards to her rheumatoid arthritis, she continues to see the rheumatologist        The following portions of the patient's history were reviewed and updated as appropriate:   She   Patient Active Problem List    Diagnosis Date Noted    Irritable bowel syndrome with diarrhea 04/22/2021    NEO (obstructive sleep apnea)     Impaired fasting glucose 05/29/2019    Menorrhagia with regular cycle 04/08/2019    Post traumatic stress disorder (PTSD) 01/25/2019    Depression with anxiety 12/10/2018    Gastroesophageal reflux disease without esophagitis 03/27/2018    Morbid obesity with BMI of 40 0-44 9, adult ([place] [address] ) 03/27/2018    Post-surgical hypothyroidism 03/27/2018    Rheumatoid arthritis involving multiple sites ([place] [address] ) 03/27/2018    Thyroid cancer ([place] [address] ) 03/27/2018     Current Outpatient Medications   Medication Sig Dispense Refill    albuterol (PROVENTIL HFA,VENTOLIN HFA) 90 mcg/act inhaler Inhale 2 puffs every 6 (six) hours as needed for wheezing or shortness of breath 1 Inhaler 5    ascorbic Acid (VITAMIN C) 500 MG CPCR Take 500 mg by mouth daily      busPIRone (BUSPAR) 5 mg tablet TAKE ONE TABLET BY MOUTH THREE TIMES A DAY 90 tablet 3    folic acid (FOLVITE) 1 mg tablet       levothyroxine 200 mcg tablet Take 200 mcg by mouth daily      methotrexate (RHEUMATREX) 2 5 MG tablet Take 12 5 mg by mouth once a week      pantoprazole (PROTONIX) 40 mg tablet Take 1 tablet (40 mg total) by mouth daily 31 tablet 11    sertraline (ZOLOFT) 50 mg tablet Take 1 1/2 tabs daily 45 tablet 3    Cholecalciferol (VITAMIN D-3) 5000 units TABS Take 5,000 Units by mouth      dicyclomine (BENTYL) 20 mg tablet Take 1 tablet (20 mg total) by mouth every 6 (six) hours as needed (abdominal pain) 60 tablet 2     No current facility-administered medications for this visit  She has No Known Allergies       Review of Systems   Constitutional: Negative  HENT: Negative  Respiratory: Negative  Cardiovascular: Negative  Gastrointestinal: Positive for diarrhea (Improving)  Genitourinary: Negative  Skin: Negative  Neurological: Negative  Psychiatric/Behavioral: Positive for agitation, dysphoric mood and sleep disturbance  The patient is nervous/anxious  /80   Pulse 80   Temp (!) 96 8 °F (36 °C)   Resp 14   Ht 5' 7" (1 702 m)   Wt 123 kg (270 lb 9 6 oz)   SpO2 98%   BMI 42 38 kg/m²     Objective:     Physical Exam  Vitals signs and nursing note reviewed  Constitutional:       General: She is not in acute distress  Appearance: Normal appearance  She is well-developed  She is obese  She is not ill-appearing, toxic-appearing or diaphoretic  HENT:      Head: Normocephalic and atraumatic  Eyes:      Conjunctiva/sclera: Conjunctivae normal    Neck:      Musculoskeletal: Neck supple  Cardiovascular:      Rate and Rhythm: Normal rate and regular rhythm  Heart sounds: Normal heart sounds  No murmur  Pulmonary:      Effort: Pulmonary effort is normal  No respiratory distress  Breath sounds: Normal breath sounds  No wheezing or rales  Chest:      Chest wall: No tenderness  Neurological:      General: No focal deficit present  Mental Status: She is alert and oriented to person, place, and time     Psychiatric:         Mood and Affect: Mood normal          Behavior: Behavior normal  Thought Content: Thought content normal          Judgment: Judgment normal          BMI Counseling: Body mass index is 42 38 kg/m²  The BMI is above normal  Nutrition recommendations include decreasing overall calorie intake, 3-5 servings of fruits/vegetables daily, reducing fast food intake, consuming healthier snacks, decreasing soda and/or juice intake, moderation in carbohydrate intake and increasing intake of lean protein  Exercise recommendations include exercising 3-5 times per week

## 2021-04-29 ENCOUNTER — OFFICE VISIT (OUTPATIENT)
Dept: PULMONOLOGY | Facility: CLINIC | Age: 41
End: 2021-04-29
Payer: COMMERCIAL

## 2021-04-29 ENCOUNTER — DOCUMENTATION (OUTPATIENT)
Dept: PULMONOLOGY | Facility: CLINIC | Age: 41
End: 2021-04-29

## 2021-04-29 VITALS
TEMPERATURE: 97.7 F | WEIGHT: 267 LBS | BODY MASS INDEX: 41.91 KG/M2 | DIASTOLIC BLOOD PRESSURE: 78 MMHG | HEART RATE: 80 BPM | OXYGEN SATURATION: 97 % | HEIGHT: 67 IN | SYSTOLIC BLOOD PRESSURE: 122 MMHG

## 2021-04-29 DIAGNOSIS — G47.33 OSA (OBSTRUCTIVE SLEEP APNEA): Primary | ICD-10-CM

## 2021-04-29 DIAGNOSIS — E66.01 MORBID OBESITY WITH BMI OF 40.0-44.9, ADULT (HCC): ICD-10-CM

## 2021-04-29 DIAGNOSIS — G47.19 EXCESSIVE DAYTIME SLEEPINESS: ICD-10-CM

## 2021-04-29 PROCEDURE — 99213 OFFICE O/P EST LOW 20 MIN: CPT | Performed by: PHYSICIAN ASSISTANT

## 2021-04-29 PROCEDURE — 3008F BODY MASS INDEX DOCD: CPT | Performed by: NURSE PRACTITIONER

## 2021-04-29 NOTE — PROGRESS NOTES
Assessment/Plan:   Diagnoses and all orders for this visit:    NEO (obstructive sleep apnea)  -     CPAP Auto New DME    Morbid obesity with BMI of 40 0-44 9, adult (HCC)    Excessive daytime sleepiness      Patient is here today for follow-up on her sleep study results  She had a home sleep study done which shows mild sleep apnea with an AHI of 5  Suspect that this is underestimated given the severity of her symptoms  She continues to have nighttime awakenings, significant snoring, and excessive daytime sleepiness  She had been on a CPAP that was started in the end of 2019 into 2020  She did feel improvement in her nighttime and daytime symptoms with the use of the CPAP but this was taken away as she was noncompliant due to having COVID  She is motivated to use the CPAP given her improvement with its past use  Will order her an auto CPAP  She will follow-up with us in 3 months or sooner if necessary  Return in about 3 months (around 7/29/2021)  All questions are answered to the patient's satisfaction and understanding  She verbalizes understanding  She is encouraged to call with any further questions or concerns  Portions of the record may have been created with voice recognition software  Occasional wrong word or "sound a like" substitutions may have occurred due to the inherent limitations of voice recognition software  Read the chart carefully and recognize, using context, where substitutions have occurred  Electronically Signed by Atiya Caraballo PA-C    ______________________________________________________________________    Chief Complaint:   Chief Complaint   Patient presents with    Follow-up    Sleep Apnea       Patient ID: Praveen Degroot is a 38 y o  y o  female has a past medical history of Arthritis, Colon polyp, NEO (obstructive sleep apnea), Sleep apnea, Thyroid cancer ([facility] [address] ), and Varicella  4/29/2021  Patient presents today for follow-up visit    Patient is a 48-year-old female with past medical history of thyroid cancer, RA, mild sleep apnea not currently on CPAP  There were some issues with compliance due to having COVID and she had to return her CPAP to the [employer] company  She underwent a repeat sleep study and is here today for follow-up  Sleep study shows mild sleep apnea with an AHI of 5  She continues to have nighttime awakenings, snoring, excessive daytime sleepiness  She did benefit from the use of the CPAP when she did have it last year  Review of Systems   Constitutional: Positive for fatigue  HENT: Negative  Respiratory: Positive for apnea  Cardiovascular: Negative  Gastrointestinal: Negative  Genitourinary: Negative  Musculoskeletal: Negative  Skin: Negative  Allergic/Immunologic: Negative  Neurological: Negative  Psychiatric/Behavioral: Negative  Smoking history: She reports that she quit smoking about 5 years ago   She has never used smokeless tobacco     The following portions of the patient's history were reviewed and updated as appropriate: allergies, current medications, past family history, past medical history, past social history, past surgical history and problem list     Immunization History   Administered Date(s) Administered    Influenza, recombinant, quadrivalent,injectable, preservative free 12/10/2018    Pneumococcal Polysaccharide PPV23 09/21/2020    Tdap 12/10/2018    influenza, injectable, quadrivalent 10/01/2020     Current Outpatient Medications   Medication Sig Dispense Refill    albuterol (PROVENTIL HFA,VENTOLIN HFA) 90 mcg/act inhaler Inhale 2 puffs every 6 (six) hours as needed for wheezing or shortness of breath 1 Inhaler 5    ascorbic Acid (VITAMIN C) 500 MG CPCR Take 500 mg by mouth daily      busPIRone (BUSPAR) 5 mg tablet TAKE ONE TABLET BY MOUTH THREE TIMES A DAY 90 tablet 3    Cholecalciferol (VITAMIN D-3) 5000 units TABS Take 5,000 Units by mouth      dicyclomine (BENTYL) 20 mg tablet Take 1 tablet (20 mg total) by mouth every 6 (six) hours as needed (abdominal pain) 60 tablet 2    folic acid (FOLVITE) 1 mg tablet       levothyroxine 200 mcg tablet Take 200 mcg by mouth daily      methotrexate (RHEUMATREX) 2 5 MG tablet Take 12 5 mg by mouth once a week      pantoprazole (PROTONIX) 40 mg tablet Take 1 tablet (40 mg total) by mouth daily 31 tablet 11    sertraline (ZOLOFT) 50 mg tablet Take 1 1/2 tabs daily 45 tablet 3     No current facility-administered medications for this visit  Allergies: Patient has no known allergies  Objective:  Vitals:    04/29/21 0950   BP: 122/78   Pulse: 80   Temp: 97 7 °F (36 5 °C)   SpO2: 97%   Weight: 121 kg (267 lb)   Height: 5' 7" (1 702 m)   Oxygen Therapy  SpO2: 97 %    Wt Readings from Last 3 Encounters:   04/29/21 121 kg (267 lb)   04/22/21 123 kg (270 lb 9 6 oz)   04/19/21 123 kg (270 lb 6 4 oz)     Body mass index is 41 82 kg/m²  Physical Exam    Lab Review:   Lab Results   Component Value Date    K 3 9 03/22/2021     03/22/2021    CO2 25 03/22/2021    BUN 14 03/22/2021    CREATININE 0 94 03/22/2021    CALCIUM 8 3 03/22/2021     Lab Results   Component Value Date    WBC 7 47 03/22/2021    HGB 13 3 03/22/2021    HCT 41 8 03/22/2021    MCV 91 03/22/2021     (H) 03/22/2021       Diagnostics:  I have personally reviewed pertinent reports  Reviewed sleep study  Office Spirometry Results:     ESS: Total score: 9  No results found

## 2021-05-25 ENCOUNTER — HOSPITAL ENCOUNTER (OUTPATIENT)
Dept: MAMMOGRAPHY | Facility: CLINIC | Age: 41
Discharge: HOME | End: 2021-05-25
Payer: COMMERCIAL

## 2021-05-25 ENCOUNTER — TELEPHONE (OUTPATIENT)
Dept: FAMILY MEDICINE CLINIC | Facility: CLINIC | Age: 41
End: 2021-05-25

## 2021-05-25 ENCOUNTER — TELEPHONE (OUTPATIENT)
Dept: PSYCHIATRY | Facility: CLINIC | Age: 41
End: 2021-05-25

## 2021-05-25 ENCOUNTER — HOSPITAL ENCOUNTER (OUTPATIENT)
Dept: ULTRASOUND IMAGING | Facility: CLINIC | Age: 41
Discharge: HOME | End: 2021-05-25
Payer: COMMERCIAL

## 2021-05-25 DIAGNOSIS — R92.8 ABNORMAL MAMMOGRAM: ICD-10-CM

## 2021-05-25 DIAGNOSIS — F41.8 DEPRESSION WITH ANXIETY: Primary | ICD-10-CM

## 2021-05-25 PROCEDURE — 77066 DX MAMMO INCL CAD BI: CPT

## 2021-05-25 PROCEDURE — G0279 TOMOSYNTHESIS, MAMMO: HCPCS

## 2021-05-25 PROCEDURE — 76642 ULTRASOUND BREAST LIMITED: CPT

## 2021-05-25 NOTE — TELEPHONE ENCOUNTER
Pt called looking to set up w/ a psychiatrist  I informed pt  call RUBEN Guajardo to put in a referral in the sytem   Once in I will forward information to Intake

## 2021-05-26 ENCOUNTER — TELEPHONE (OUTPATIENT)
Dept: PSYCHIATRY | Facility: CLINIC | Age: 41
End: 2021-05-26

## 2021-05-26 NOTE — TELEPHONE ENCOUNTER
Behavorial Health Outpatient Intake Questions    Referred by: PCP    Please advised interviewee that they need to answer all questions truthfully to allow for best care and any misrepresentations of information may affect their ability to be seen at this clinic   => Was this discussed? Yes     [facility]     Presenting Problem (in patient's words):   Depression and anxiety  PCP has been prescribing meds, patient feels they aren't working  Meds aren't working     Are there any developmental disabilities? ? If yes, can they speak to you on the phone? If they are too limited to speak to you on phone, refer out No    Are you taking any psychiatric medications? Yes    => If yes, who prescribes? If yes, are they injectable medications?   sertraline (ZOLOFT) 50 mg tablet   busPIRone (BUSPAR) 5 mg tablet       Does the patient have a language barrier or hearing impairment? No     Have you been treated at [facility] by a therapist or a doctor in the past? If yes, who? Yes, Janine Dsouza    Has the patient been hospitalized for mental health? No   If yes, how long ago was last hospitalization and where was it? Do you actively use alcohol or marijuana or illegal substances? If yes, what and how much - refer out to Drug and alcohol treatment if use is excessive or daily use of illegal substances No concerns of substance abuse are reported  Do you have a community treatment team or ? No    Legal History-     Does the patient have any history of arrests, retirement/skilled nursing time, or DUIs? No  If Yes-  1) What types of charges? 2) When were they last incarcerated? 3) Are they currently on parole or probation? Minor Child-    Who has custody of the child? Is there a custody agreement? If there is a custody agreement remind parent that they must bring a copy to the first appt or they will not be seen       Intake Team, please check with provider before scheduling if flags come up such as:  - complex case  - legal history (other than DUI)  - communication barrier concerns are present  - if, in your judgment, this needs further review    ACCEPTED as a patient Yes  => Appointment Date: 07/26/2021 at 1:00 p m with Beverley Cerda    Referred Elsewhere? No    Name of Insurance Co: Zain Sexton [address] ID# [id number] Phone #  If ins is primary or secondary  If patient is a minor, parents information such as Name, D  O B of guarantor

## 2021-06-01 DIAGNOSIS — N63.0 BREAST MASS IN FEMALE: ICD-10-CM

## 2021-06-01 DIAGNOSIS — Z12.31 ENCOUNTER FOR SCREENING MAMMOGRAM FOR MALIGNANT NEOPLASM OF BREAST: Primary | ICD-10-CM

## 2021-06-02 ENCOUNTER — TELEPHONE (OUTPATIENT)
Dept: PULMONOLOGY | Facility: CLINIC | Age: 41
End: 2021-06-02

## 2021-06-02 ENCOUNTER — DOCUMENTATION (OUTPATIENT)
Dept: PULMONOLOGY | Facility: CLINIC | Age: 41
End: 2021-06-02

## 2021-06-02 NOTE — TELEPHONE ENCOUNTER
Patient states she still has not heard from [facility] about her cpap  Can one of you look into this for her? Thank you

## 2021-06-02 NOTE — PROGRESS NOTES
Pt called stating has not heard from [employer]   I called Jose and the claim they never received so I entered into [place]

## 2021-06-03 ENCOUNTER — TELEPHONE (OUTPATIENT)
Dept: PSYCHOLOGY | Facility: CLINIC | Age: 41
End: 2021-06-03

## 2021-06-03 ENCOUNTER — OFFICE VISIT (OUTPATIENT)
Dept: FAMILY MEDICINE CLINIC | Facility: CLINIC | Age: 41
End: 2021-06-03
Payer: COMMERCIAL

## 2021-06-03 ENCOUNTER — TELEPHONE (OUTPATIENT)
Dept: FAMILY MEDICINE CLINIC | Facility: CLINIC | Age: 41
End: 2021-06-03

## 2021-06-03 VITALS
WEIGHT: 264 LBS | OXYGEN SATURATION: 98 % | HEART RATE: 92 BPM | HEIGHT: 67 IN | RESPIRATION RATE: 16 BRPM | SYSTOLIC BLOOD PRESSURE: 110 MMHG | BODY MASS INDEX: 41.44 KG/M2 | TEMPERATURE: 97 F | DIASTOLIC BLOOD PRESSURE: 80 MMHG

## 2021-06-03 DIAGNOSIS — F41.8 DEPRESSION WITH ANXIETY: Primary | ICD-10-CM

## 2021-06-03 DIAGNOSIS — F43.10 POST TRAUMATIC STRESS DISORDER (PTSD): ICD-10-CM

## 2021-06-03 PROCEDURE — 3008F BODY MASS INDEX DOCD: CPT | Performed by: NURSE PRACTITIONER

## 2021-06-03 PROCEDURE — 1036F TOBACCO NON-USER: CPT | Performed by: NURSE PRACTITIONER

## 2021-06-03 PROCEDURE — 99214 OFFICE O/P EST MOD 30 MIN: CPT | Performed by: NURSE PRACTITIONER

## 2021-06-03 RX ORDER — SERTRALINE HYDROCHLORIDE 100 MG/1
100 TABLET, FILM COATED ORAL DAILY
Qty: 30 TABLET | Refills: 1 | Status: SHIPPED | OUTPATIENT
Start: 2021-06-03 | End: 2021-08-23 | Stop reason: DRUGHIGH

## 2021-06-03 RX ORDER — BUSPIRONE HYDROCHLORIDE 10 MG/1
10 TABLET ORAL 3 TIMES DAILY
Qty: 90 TABLET | Refills: 0 | Status: SHIPPED | OUTPATIENT
Start: 2021-06-03

## 2021-06-03 RX ORDER — LORAZEPAM 0.5 MG/1
0.5 TABLET ORAL 2 TIMES DAILY PRN
Qty: 20 TABLET | Refills: 0 | Status: SHIPPED | OUTPATIENT
Start: 2021-06-03 | End: 2021-08-23 | Stop reason: ALTCHOICE

## 2021-06-03 NOTE — ASSESSMENT & PLAN NOTE
Will increase buspirone to 10 mg every 8 hours and sertraline 100 mg daily  Will also add lorazepam 0 5 mg as needed for breakthrough anxiety  Provided referral to [facility]   Patient is were agreeable to this  Will have office staff call to begin process  To follow-up in 1 month or sooner if needed

## 2021-06-03 NOTE — TELEPHONE ENCOUNTER
Called patient to offer PHP but she stated she is heading to the hospital and cannot do PHP at this time      Sudheer Dubon

## 2021-06-03 NOTE — PROGRESS NOTES
Assessment/Plan:    Depression with anxiety  Will increase buspirone to 10 mg every 8 hours and sertraline 100 mg daily  Will also add lorazepam 0 5 mg as needed for breakthrough anxiety  Provided referral to [facility]   Patient is were agreeable to this  Will have office staff call to begin process  To follow-up in 1 month or sooner if needed  Post traumatic stress disorder (PTSD)   To continue weekly counseling until [facility] starts  Diagnoses and all orders for this visit:    Depression with anxiety  -     Ambulatory referral to [facility] or partial psych program; Future  -     sertraline (ZOLOFT) 100 mg tablet; Take 1 tablet (100 mg total) by mouth daily  -     busPIRone (BUSPAR) 10 mg tablet; Take 1 tablet (10 mg total) by mouth 3 (three) times a day  -     LORazepam (ATIVAN) 0 5 mg tablet; Take 1 tablet (0 5 mg total) by mouth 2 (two) times a day as needed for anxiety    Post traumatic stress disorder (PTSD)  -     Ambulatory referral to [facility] or partial psych program; Future          Subjective:      Patient ID: Nuris Calderon is a 38 y o  female  Suzie Coupe presents for a follow-up  She reports a recent worsening of her anxiety and depression  She had a significant panic attack on Saturday which she thinks was triggered by a nightmare  Suzie Noble attributes her worsening anxiety and depression to her mother's recent death as well as conflict with her father  She is having difficulty concentrating, difficulty sleeping at times, decrease in appetite, fatigue, feeling anxious and on edge, and difficulty getting out of bed in the morning  Denies thoughts of self-harm or harming others  Suzie Noble continues to have counseling weekly which is helpful but she also feels as though it brings a lot of issues which worsens anxiety  She also made an appointment with his psychiatrist which is at the end of July        The following portions of the patient's history were reviewed and updated as appropriate:   She   Patient Active Problem List    Diagnosis Date Noted    Irritable bowel syndrome with diarrhea 04/22/2021    NEO (obstructive sleep apnea)     Impaired fasting glucose 05/29/2019    Menorrhagia with regular cycle 04/08/2019    Post traumatic stress disorder (PTSD) 01/25/2019    Depression with anxiety 12/10/2018    Gastroesophageal reflux disease without esophagitis 03/27/2018    Morbid obesity with BMI of 40 0-44 9, adult ([place] [id number] ) 03/27/2018    Post-surgical hypothyroidism 03/27/2018    Rheumatoid arthritis involving multiple sites ([place] [id number] ) 03/27/2018    Thyroid cancer ([place] [id number] ) 03/27/2018     Current Outpatient Medications   Medication Sig Dispense Refill    albuterol (PROVENTIL HFA,VENTOLIN HFA) 90 mcg/act inhaler Inhale 2 puffs every 6 (six) hours as needed for wheezing or shortness of breath 1 Inhaler 5    ascorbic Acid (VITAMIN C) 500 MG CPCR Take 500 mg by mouth daily      Cholecalciferol (VITAMIN D-3) 5000 units TABS Take 5,000 Units by mouth      folic acid (FOLVITE) 1 mg tablet       levothyroxine 200 mcg tablet Take 200 mcg by mouth daily      methotrexate (RHEUMATREX) 2 5 MG tablet Take 12 5 mg by mouth once a week      pantoprazole (PROTONIX) 40 mg tablet Take 1 tablet (40 mg total) by mouth daily 31 tablet 11    busPIRone (BUSPAR) 10 mg tablet Take 1 tablet (10 mg total) by mouth 3 (three) times a day 90 tablet 0    dicyclomine (BENTYL) 20 mg tablet Take 1 tablet (20 mg total) by mouth every 6 (six) hours as needed (abdominal pain) 60 tablet 2    LORazepam (ATIVAN) 0 5 mg tablet Take 1 tablet (0 5 mg total) by mouth 2 (two) times a day as needed for anxiety 20 tablet 0    sertraline (ZOLOFT) 100 mg tablet Take 1 tablet (100 mg total) by mouth daily 30 tablet 1     No current facility-administered medications for this visit  She has No Known Allergies       Review of Systems   Constitutional: Positive for fatigue  Respiratory: Negative  Cardiovascular: Negative  Gastrointestinal:        Abdominal discomfort    Musculoskeletal: Positive for arthralgias  Psychiatric/Behavioral: Positive for agitation, decreased concentration, dysphoric mood and sleep disturbance  Negative for self-injury and suicidal ideas  The patient is nervous/anxious  /80   Pulse 92   Temp (!) 97 °F (36 1 °C)   Resp 16   Ht 5' 7" (1 702 m)   Wt 120 kg (264 lb)   LMP 05/16/2021 (Exact Date)   SpO2 98%   BMI 41 35 kg/m²     Objective:     Physical Exam  Vitals signs and nursing note reviewed  Constitutional:       Appearance: She is well-developed  HENT:      Head: Normocephalic and atraumatic  Eyes:      Conjunctiva/sclera: Conjunctivae normal    Neck:      Musculoskeletal: Neck supple  Cardiovascular:      Rate and Rhythm: Normal rate and regular rhythm  Heart sounds: Normal heart sounds  No murmur  Pulmonary:      Effort: Pulmonary effort is normal  No respiratory distress  Breath sounds: Normal breath sounds  No wheezing or rales  Chest:      Chest wall: No tenderness  Neurological:      Mental Status: She is alert and oriented to person, place, and time  Psychiatric:         Attention and Perception: Attention normal          Mood and Affect: Mood is anxious  Affect is flat and tearful  Speech: Speech normal          Behavior: Behavior normal          Thought Content:  Thought content normal          Cognition and Memory: Cognition normal          Judgment: Judgment normal       Comments:   Poor eye contact

## 2021-06-03 NOTE — TELEPHONE ENCOUNTER
Patient called advising she is not feeling well and said she will be checking herself in to the hospital

## 2021-07-27 ENCOUNTER — OFFICE VISIT (OUTPATIENT)
Dept: PULMONOLOGY | Facility: CLINIC | Age: 41
End: 2021-07-27
Payer: COMMERCIAL

## 2021-07-27 VITALS
TEMPERATURE: 97.4 F | HEART RATE: 68 BPM | WEIGHT: 275.4 LBS | SYSTOLIC BLOOD PRESSURE: 122 MMHG | DIASTOLIC BLOOD PRESSURE: 78 MMHG | OXYGEN SATURATION: 97 % | HEIGHT: 67 IN | BODY MASS INDEX: 43.22 KG/M2

## 2021-07-27 DIAGNOSIS — R40.0 DAYTIME SLEEPINESS: ICD-10-CM

## 2021-07-27 DIAGNOSIS — E66.01 MORBID OBESITY WITH BMI OF 40.0-44.9, ADULT (HCC): ICD-10-CM

## 2021-07-27 DIAGNOSIS — G47.33 OSA (OBSTRUCTIVE SLEEP APNEA): Primary | ICD-10-CM

## 2021-07-27 PROCEDURE — 3008F BODY MASS INDEX DOCD: CPT | Performed by: PHYSICIAN ASSISTANT

## 2021-07-27 PROCEDURE — 1036F TOBACCO NON-USER: CPT | Performed by: PHYSICIAN ASSISTANT

## 2021-07-27 PROCEDURE — 99213 OFFICE O/P EST LOW 20 MIN: CPT | Performed by: PHYSICIAN ASSISTANT

## 2021-07-27 RX ORDER — ARIPIPRAZOLE 5 MG/1
5 TABLET ORAL DAILY
COMMUNITY
Start: 2021-06-30

## 2021-07-27 RX ORDER — HYDROXYZINE PAMOATE 25 MG/1
1 CAPSULE ORAL 2 TIMES DAILY
COMMUNITY
Start: 2021-06-30

## 2021-07-27 RX ORDER — TRAZODONE HYDROCHLORIDE 50 MG/1
1 TABLET ORAL DAILY PRN
COMMUNITY
Start: 2021-06-30

## 2021-07-27 RX ORDER — PRAZOSIN HYDROCHLORIDE 1 MG/1
2 CAPSULE ORAL
COMMUNITY
Start: 2021-06-30

## 2021-07-27 NOTE — PROGRESS NOTES
Assessment/Plan:   Diagnoses and all orders for this visit:    NEO (obstructive sleep apnea)    Daytime sleepiness    Morbid obesity with BMI of 40 0-44 9, adult (HCC)    Other orders  -     ARIPiprazole (ABILIFY) 5 mg tablet; Take 5 mg by mouth daily  -     hydrOXYzine pamoate (VISTARIL) 25 mg capsule; Take 1 capsule by mouth 2 (two) times a day  -     methotrexate 2 5 mg tablet; Take 5 tablets by mouth once a week  -     prazosin (MINIPRESS) 1 mg capsule; Take 2 mg by mouth daily at bedtime  -     sertraline (ZOLOFT) 50 mg tablet; Take 50 mg by mouth daily  -     traZODone (DESYREL) 50 mg tablet; Take 1 tablet by mouth daily as needed     Patient is here today for follow-up  She had a sleep study done was found to have mild sleep apnea with AHI 5, given her significant symptoms of daytime sleepiness we decided to treat her with a CPAP  Also do think the home study was underestimated  Reviewed compliance which shows she is using the CPAP about 4 hours per night, she has slowly been able to adjust to using it longer periods  Residual AHI is 1 9  She not really noted much change in her daytime sleepiness so far  Daytime sleepiness may also be contributed by her underlying rheumatoid arthritis as well as hypothyroidism after her thyroid cancer  Will have her continue with the CPAP at the current settings, will hopefully be able to use it for longer periods of time as she gets used to it  Will have her follow-up with us in 3 months or sooner if necessary  Return in about 3 months (around 10/27/2021)  All questions are answered to the patient's satisfaction and understanding  She verbalizes understanding  She is encouraged to call with any further questions or concerns  Portions of the record may have been created with voice recognition software  Occasional wrong word or "sound a like" substitutions may have occurred due to the inherent limitations of voice recognition software    Read the chart carefully and recognize, using context, where substitutions have occurred  Electronically Signed by Juan Sauer PA-C    ______________________________________________________________________    Chief Complaint: No chief complaint on file  Patient ID: Devin Snellen is a 38 y o  y o  female has a past medical history of Arthritis, Colon polyp, NEO (obstructive sleep apnea), Sleep apnea, Thyroid cancer ([address] ), and Varicella  7/27/2021  Patient presents today for follow-up visit  Patient is a 41-year-old female with past medical history of thyroid cancer, RA, mild sleep apnea not currently on CPAP  There were some issues with compliance due to having COVID and she had to return her CPAP to the [employer] company  She is here today for follow-up  She recently received her CPAP  Has been slowly getting used to it  She does not note any change in her daytime sleepiness so far  Also having adjustments made to her thyroid medications    primary symptoms  Pertinent negatives include no chest pain, fever, headaches, myalgias or sore throat  Review of Systems   Constitutional: Positive for appetite change  Negative for fever  HENT: Negative  Negative for ear pain, postnasal drip, rhinorrhea, sneezing, sore throat and trouble swallowing  Respiratory: Negative  Cardiovascular: Negative  Negative for chest pain  Gastrointestinal: Negative  Genitourinary: Negative  Musculoskeletal: Negative  Negative for myalgias  Skin: Negative  Allergic/Immunologic: Negative  Neurological: Negative  Negative for headaches  Psychiatric/Behavioral: Negative  Smoking history: She reports that she quit smoking about 6 years ago   She has never used smokeless tobacco     The following portions of the patient's history were reviewed and updated as appropriate: allergies, current medications, past family history, past medical history, past social history, past surgical history and problem list     Immunization History   Administered Date(s) Administered    Influenza, recombinant, quadrivalent,injectable, preservative free 12/10/2018    Pneumococcal Polysaccharide PPV23 09/21/2020    Tdap 12/10/2018    influenza, injectable, quadrivalent 10/01/2020     Current Outpatient Medications   Medication Sig Dispense Refill    albuterol (PROVENTIL HFA,VENTOLIN HFA) 90 mcg/act inhaler Inhale 2 puffs every 6 (six) hours as needed for wheezing or shortness of breath 1 Inhaler 5    ARIPiprazole (ABILIFY) 5 mg tablet Take 5 mg by mouth daily      ascorbic Acid (VITAMIN C) 500 MG CPCR Take 500 mg by mouth daily      busPIRone (BUSPAR) 10 mg tablet Take 1 tablet (10 mg total) by mouth 3 (three) times a day 90 tablet 0    Cholecalciferol (VITAMIN D-3) 5000 units TABS Take 5,000 Units by mouth      dicyclomine (BENTYL) 20 mg tablet Take 1 tablet (20 mg total) by mouth every 6 (six) hours as needed (abdominal pain) 60 tablet 2    folic acid (FOLVITE) 1 mg tablet       hydrOXYzine pamoate (VISTARIL) 25 mg capsule Take 1 capsule by mouth 2 (two) times a day      levothyroxine 200 mcg tablet Take 200 mcg by mouth daily      LORazepam (ATIVAN) 0 5 mg tablet Take 1 tablet (0 5 mg total) by mouth 2 (two) times a day as needed for anxiety 20 tablet 0    methotrexate (RHEUMATREX) 2 5 MG tablet Take 12 5 mg by mouth once a week      methotrexate 2 5 mg tablet Take 5 tablets by mouth once a week      pantoprazole (PROTONIX) 40 mg tablet Take 1 tablet (40 mg total) by mouth daily 31 tablet 11    prazosin (MINIPRESS) 1 mg capsule Take 2 mg by mouth daily at bedtime      sertraline (ZOLOFT) 50 mg tablet Take 50 mg by mouth daily      traZODone (DESYREL) 50 mg tablet Take 1 tablet by mouth daily as needed      sertraline (ZOLOFT) 100 mg tablet Take 1 tablet (100 mg total) by mouth daily (Patient not taking: Reported on 7/27/2021) 30 tablet 1     No current facility-administered medications for this visit  Allergies: Patient has no known allergies  Objective:  Vitals:    07/27/21 0940   BP: 122/78   Pulse: 68   Temp: (!) 97 4 °F (36 3 °C)   SpO2: 97%   Weight: 125 kg (275 lb 6 4 oz)   Height: 5' 7" (1 702 m)   Oxygen Therapy  SpO2: 97 %    Wt Readings from Last 3 Encounters:   07/27/21 125 kg (275 lb 6 4 oz)   06/03/21 120 kg (264 lb)   04/29/21 121 kg (267 lb)     Body mass index is 43 13 kg/m²  Physical Exam  Vitals reviewed  Constitutional:       General: She is not in acute distress  Appearance: Normal appearance  She is obese  She is not ill-appearing  HENT:      Head: Normocephalic and atraumatic  Eyes:      Conjunctiva/sclera: Conjunctivae normal    Cardiovascular:      Rate and Rhythm: Normal rate and regular rhythm  Pulmonary:      Effort: Pulmonary effort is normal  No respiratory distress  Breath sounds: Normal breath sounds  No decreased breath sounds, wheezing, rhonchi or rales  Abdominal:      General: Abdomen is flat  There is no distension  Musculoskeletal:         General: Normal range of motion  Cervical back: Normal range of motion  Right lower leg: No edema  Left lower leg: No edema  Skin:     General: Skin is warm and dry  Neurological:      Mental Status: She is alert and oriented to person, place, and time  Psychiatric:         Mood and Affect: Mood normal          Behavior: Behavior normal          Lab Review:   Lab Results   Component Value Date    K 3 9 03/22/2021     03/22/2021    CO2 25 03/22/2021    BUN 14 03/22/2021    CREATININE 0 94 03/22/2021    CALCIUM 8 3 03/22/2021     Lab Results   Component Value Date    WBC 7 47 03/22/2021    HGB 13 3 03/22/2021    HCT 41 8 03/22/2021    MCV 91 03/22/2021     (H) 03/22/2021       Diagnostics:  I have personally reviewed pertinent reports  reviewed CPAP compliance  Office Spirometry Results:     ESS: Total score: 9  No results found    Answers for HPI/ROS submitted by the patient on 7/27/2021  Chronicity: chronic  When did you first notice your symptoms?: more than 1 year ago  How often do your symptoms occur?: intermittently  Since you first noticed this problem, how has it changed?: unchanged  Do you have shortness of breath that occurs with effort or exertion?: No  Do you have ear congestion?: No  Do you have heartburn?: No  Do you have fatigue?: Yes  Do you have nasal congestion?: No  Do you have shortness of breath when lying flat?: No  Do you have shortness of breath when you wake up?: No  Do you have sweats?: No  Have you experienced weight loss?: No  Which of the following makes your symptoms worse?: emotional stress  Which of the following makes your symptoms better?: change in position, cold air, rest

## 2021-08-15 DIAGNOSIS — F32.A DEPRESSION, UNSPECIFIED DEPRESSION TYPE: Primary | ICD-10-CM

## 2021-08-23 ENCOUNTER — OFFICE VISIT (OUTPATIENT)
Dept: FAMILY MEDICINE CLINIC | Facility: CLINIC | Age: 41
End: 2021-08-23
Payer: COMMERCIAL

## 2021-08-23 ENCOUNTER — APPOINTMENT (OUTPATIENT)
Dept: LAB | Facility: HOSPITAL | Age: 41
End: 2021-08-23
Payer: COMMERCIAL

## 2021-08-23 VITALS
OXYGEN SATURATION: 98 % | BODY MASS INDEX: 43.79 KG/M2 | WEIGHT: 279 LBS | DIASTOLIC BLOOD PRESSURE: 70 MMHG | HEART RATE: 76 BPM | HEIGHT: 67 IN | SYSTOLIC BLOOD PRESSURE: 118 MMHG

## 2021-08-23 DIAGNOSIS — M06.9 RHEUMATOID ARTHRITIS INVOLVING MULTIPLE SITES, UNSPECIFIED WHETHER RHEUMATOID FACTOR PRESENT (HCC): ICD-10-CM

## 2021-08-23 DIAGNOSIS — F43.10 POST TRAUMATIC STRESS DISORDER (PTSD): ICD-10-CM

## 2021-08-23 DIAGNOSIS — E66.01 MORBID OBESITY WITH BMI OF 40.0-44.9, ADULT (HCC): ICD-10-CM

## 2021-08-23 DIAGNOSIS — R07.9 CHEST PAIN, UNSPECIFIED TYPE: ICD-10-CM

## 2021-08-23 DIAGNOSIS — C73 THYROID CANCER (HCC): ICD-10-CM

## 2021-08-23 DIAGNOSIS — M54.2 NECK PAIN: ICD-10-CM

## 2021-08-23 DIAGNOSIS — R07.9 CHEST PAIN, UNSPECIFIED TYPE: Primary | ICD-10-CM

## 2021-08-23 DIAGNOSIS — F41.8 DEPRESSION WITH ANXIETY: ICD-10-CM

## 2021-08-23 LAB
ALBUMIN SERPL BCP-MCNC: 3.6 G/DL (ref 3.5–5)
ALP SERPL-CCNC: 40 U/L (ref 46–116)
ALT SERPL W P-5'-P-CCNC: 38 U/L (ref 12–78)
ANION GAP SERPL CALCULATED.3IONS-SCNC: 9 MMOL/L (ref 4–13)
AST SERPL W P-5'-P-CCNC: 22 U/L (ref 5–45)
BASOPHILS # BLD AUTO: 0.07 THOUSANDS/ΜL (ref 0–0.1)
BASOPHILS NFR BLD AUTO: 1 % (ref 0–1)
BILIRUB SERPL-MCNC: 0.39 MG/DL (ref 0.2–1)
BUN SERPL-MCNC: 17 MG/DL (ref 5–25)
CALCIUM SERPL-MCNC: 8.5 MG/DL (ref 8.3–10.1)
CHLORIDE SERPL-SCNC: 103 MMOL/L (ref 100–108)
CO2 SERPL-SCNC: 26 MMOL/L (ref 21–32)
CREAT SERPL-MCNC: 1.13 MG/DL (ref 0.6–1.3)
EOSINOPHIL # BLD AUTO: 0.08 THOUSAND/ΜL (ref 0–0.61)
EOSINOPHIL NFR BLD AUTO: 1 % (ref 0–6)
ERYTHROCYTE [DISTWIDTH] IN BLOOD BY AUTOMATED COUNT: 14.4 % (ref 11.6–15.1)
GFR SERPL CREATININE-BSD FRML MDRD: 61 ML/MIN/1.73SQ M
GLUCOSE P FAST SERPL-MCNC: 96 MG/DL (ref 65–99)
HCT VFR BLD AUTO: 37.3 % (ref 34.8–46.1)
HGB BLD-MCNC: 12 G/DL (ref 11.5–15.4)
IMM GRANULOCYTES # BLD AUTO: 0.02 THOUSAND/UL (ref 0–0.2)
IMM GRANULOCYTES NFR BLD AUTO: 0 % (ref 0–2)
LYMPHOCYTES # BLD AUTO: 1.5 THOUSANDS/ΜL (ref 0.6–4.47)
LYMPHOCYTES NFR BLD AUTO: 21 % (ref 14–44)
MCH RBC QN AUTO: 29.3 PG (ref 26.8–34.3)
MCHC RBC AUTO-ENTMCNC: 32.2 G/DL (ref 31.4–37.4)
MCV RBC AUTO: 91 FL (ref 82–98)
MONOCYTES # BLD AUTO: 0.44 THOUSAND/ΜL (ref 0.17–1.22)
MONOCYTES NFR BLD AUTO: 6 % (ref 4–12)
NEUTROPHILS # BLD AUTO: 5.09 THOUSANDS/ΜL (ref 1.85–7.62)
NEUTS SEG NFR BLD AUTO: 71 % (ref 43–75)
NRBC BLD AUTO-RTO: 0 /100 WBCS
PLATELET # BLD AUTO: 366 THOUSANDS/UL (ref 149–390)
PMV BLD AUTO: 9.2 FL (ref 8.9–12.7)
POTASSIUM SERPL-SCNC: 4.2 MMOL/L (ref 3.5–5.3)
PROT SERPL-MCNC: 7.3 G/DL (ref 6.4–8.2)
RBC # BLD AUTO: 4.1 MILLION/UL (ref 3.81–5.12)
SODIUM SERPL-SCNC: 138 MMOL/L (ref 136–145)
T4 FREE SERPL-MCNC: 0.82 NG/DL (ref 0.76–1.46)
TSH SERPL DL<=0.05 MIU/L-ACNC: 7.88 UIU/ML (ref 0.36–3.74)
WBC # BLD AUTO: 7.2 THOUSAND/UL (ref 4.31–10.16)

## 2021-08-23 PROCEDURE — 1036F TOBACCO NON-USER: CPT | Performed by: NURSE PRACTITIONER

## 2021-08-23 PROCEDURE — 85025 COMPLETE CBC W/AUTO DIFF WBC: CPT

## 2021-08-23 PROCEDURE — 84439 ASSAY OF FREE THYROXINE: CPT

## 2021-08-23 PROCEDURE — 80053 COMPREHEN METABOLIC PANEL: CPT

## 2021-08-23 PROCEDURE — 99214 OFFICE O/P EST MOD 30 MIN: CPT | Performed by: NURSE PRACTITIONER

## 2021-08-23 PROCEDURE — 36415 COLL VENOUS BLD VENIPUNCTURE: CPT

## 2021-08-23 PROCEDURE — 3008F BODY MASS INDEX DOCD: CPT | Performed by: NURSE PRACTITIONER

## 2021-08-23 PROCEDURE — 93000 ELECTROCARDIOGRAM COMPLETE: CPT | Performed by: NURSE PRACTITIONER

## 2021-08-23 PROCEDURE — 84443 ASSAY THYROID STIM HORMONE: CPT

## 2021-08-23 RX ORDER — LEVOTHYROXINE SODIUM 175 UG/1
175 TABLET ORAL DAILY
COMMUNITY
Start: 2021-06-30 | End: 2022-06-30

## 2021-08-23 RX ORDER — TIZANIDINE 4 MG/1
4 TABLET ORAL
Qty: 15 TABLET | Refills: 0 | Status: SHIPPED | OUTPATIENT
Start: 2021-08-23 | End: 2022-06-03 | Stop reason: ALTCHOICE

## 2021-08-23 NOTE — PROGRESS NOTES
Assessment/Plan:    Chest pain   Reviewed EKG in office, presence of normal sinus rhythm  To obtain stress test, will call with results  Also counseled patient on the fact that TSH is quite suppressed due to her history of thyroid cancer  This may also contribute to chest pain and worsening anxiety  To ER for any worsening of symptoms  Morbid obesity with BMI of 40 0-44 9, adult ([facility] )    Counseled on the importance of weight loss, healthy food choices, and daily physical activity as tolerated  Depression with anxiety    Improved  To continue current medication regimen  To continue monthly appointment with psychiatrist and weekly counseling  Rheumatoid arthritis involving multiple sites [facility])    To continue care with Rheumatology  To continue folic acid, and methotrexate  Diagnoses and all orders for this visit:    Chest pain, unspecified type  -     CBC and differential; Future  -     Comprehensive metabolic panel; Future  -     TSH, 3rd generation with Free T4 reflex; Future  -     POCT ECG  -     Stress test only, exercise; Future    Neck pain  -     tiZANidine (ZANAFLEX) 4 mg tablet; Take 1 tablet (4 mg total) by mouth daily at bedtime as needed for muscle spasms    Thyroid cancer ([facility] )    Morbid obesity with BMI of 40 0-44 9, adult ([facility] )    Depression with anxiety    Post traumatic stress disorder (PTSD)    Rheumatoid arthritis involving multiple sites, unspecified whether rheumatoid factor present ([place] [id number] )    Other orders  -     levothyroxine 175 mcg tablet; Take 175 mcg by mouth daily          Subjective:      Patient ID: Miller Keita is a 38 y o  female  Rodolfo Naren presents for a follow-up  She was admitted in early June to [facility] -meal lumbar due to worsening depression  She was admitted for about a week and her medications were augmented  She is now connected to a psychiatrist and receives weekly counseling    She feels as though her symptoms have improved somewhat  Denies HI/SI  She continues to feel fatigued  For the past week, Lizette Ruiz has noted a left anterior chest discomfort she describes as a cramping  She will experience shortness of breath associated with her symptoms  Denies dizziness, nausea, or diaphoresis        The following portions of the patient's history were reviewed and updated as appropriate:   She   Patient Active Problem List    Diagnosis Date Noted    Chest pain 08/23/2021    Irritable bowel syndrome with diarrhea 04/22/2021    NEO (obstructive sleep apnea)     Impaired fasting glucose 05/29/2019    Menorrhagia with regular cycle 04/08/2019    Post traumatic stress disorder (PTSD) 01/25/2019    Depression with anxiety 12/10/2018    Gastroesophageal reflux disease without esophagitis 03/27/2018    Morbid obesity with BMI of 40 0-44 9, adult ([place] [id number] ) 03/27/2018    Post-surgical hypothyroidism 03/27/2018    Rheumatoid arthritis involving multiple sites ([place] [id number] ) 03/27/2018    Thyroid cancer ([place] [address] ) 03/27/2018     Current Outpatient Medications   Medication Sig Dispense Refill    albuterol (PROVENTIL HFA,VENTOLIN HFA) 90 mcg/act inhaler Inhale 2 puffs every 6 (six) hours as needed for wheezing or shortness of breath 1 Inhaler 5    ARIPiprazole (ABILIFY) 5 mg tablet Take 5 mg by mouth daily      ascorbic Acid (VITAMIN C) 500 MG CPCR Take 500 mg by mouth daily      busPIRone (BUSPAR) 10 mg tablet Take 1 tablet (10 mg total) by mouth 3 (three) times a day (Patient taking differently: Take 10 mg by mouth 2 (two) times a day ) 90 tablet 0    Cholecalciferol (VITAMIN D-3) 5000 units TABS Take 5,000 Units by mouth      folic acid (FOLVITE) 1 mg tablet       hydrOXYzine pamoate (VISTARIL) 25 mg capsule Take 1 capsule by mouth 2 (two) times a day      levothyroxine 175 mcg tablet Take 175 mcg by mouth daily      methotrexate 2 5 mg tablet Take 5 tablets by mouth once a week      pantoprazole (PROTONIX) 40 mg tablet Take 1 tablet (40 mg total) by mouth daily 31 tablet 11    prazosin (MINIPRESS) 1 mg capsule Take 2 mg by mouth daily at bedtime      sertraline (ZOLOFT) 50 mg tablet TAKE ONE TABLET BY MOUTH EVERY DAY 30 tablet 3    traZODone (DESYREL) 50 mg tablet Take 1 tablet by mouth daily as needed      tiZANidine (ZANAFLEX) 4 mg tablet Take 1 tablet (4 mg total) by mouth daily at bedtime as needed for muscle spasms 15 tablet 0     No current facility-administered medications for this visit  She has No Known Allergies       Review of Systems   Constitutional: Positive for fatigue  HENT: Negative  Respiratory: Positive for shortness of breath  Cardiovascular: Positive for chest pain  Negative for palpitations and leg swelling  Gastrointestinal: Positive for abdominal pain  Musculoskeletal: Positive for neck pain  Skin: Negative  Neurological: Negative  Psychiatric/Behavioral: Positive for dysphoric mood and sleep disturbance  The patient is nervous/anxious  /70   Pulse 76   Ht 5' 7" (1 702 m)   Wt 127 kg (279 lb)   SpO2 98%   BMI 43 70 kg/m²     Objective:     Physical Exam  Vitals and nursing note reviewed  Constitutional:       General: She is not in acute distress  Appearance: Normal appearance  She is well-developed  She is obese  She is not ill-appearing, toxic-appearing or diaphoretic  HENT:      Head: Normocephalic and atraumatic  Eyes:      Conjunctiva/sclera: Conjunctivae normal    Cardiovascular:      Rate and Rhythm: Normal rate and regular rhythm  Heart sounds: Normal heart sounds  No murmur heard  Pulmonary:      Effort: Pulmonary effort is normal  No respiratory distress  Breath sounds: Normal breath sounds  No wheezing or rales  Chest:      Chest wall: No tenderness  Musculoskeletal:      Cervical back: Neck supple  Comments: Bilateral tenderness at trapezius   Neurological:      Mental Status: She is alert and oriented to person, place, and time  Psychiatric:         Behavior: Behavior normal          Thought Content:  Thought content normal          Judgment: Judgment normal

## 2021-08-23 NOTE — ASSESSMENT & PLAN NOTE
Improved  To continue current medication regimen  To continue monthly appointment with psychiatrist and weekly counseling

## 2021-08-23 NOTE — ASSESSMENT & PLAN NOTE
Counseled on the importance of weight loss, healthy food choices, and daily physical activity as tolerated

## 2021-08-23 NOTE — ASSESSMENT & PLAN NOTE
Reviewed EKG in office, presence of normal sinus rhythm  To obtain stress test, will call with results  Also counseled patient on the fact that TSH is quite suppressed due to her history of thyroid cancer  This may also contribute to chest pain and worsening anxiety  To ER for any worsening of symptoms

## 2021-08-25 ENCOUNTER — HOSPITAL ENCOUNTER (EMERGENCY)
Facility: HOSPITAL | Age: 41
Discharge: HOME | End: 2021-08-25
Attending: EMERGENCY MEDICINE
Payer: OTHER MISCELLANEOUS

## 2021-08-25 ENCOUNTER — OCCMED (OUTPATIENT)
Dept: URGENT CARE | Age: 41
End: 2021-08-25

## 2021-08-25 ENCOUNTER — APPOINTMENT (EMERGENCY)
Dept: RADIOLOGY | Facility: HOSPITAL | Age: 41
End: 2021-08-25
Payer: OTHER MISCELLANEOUS

## 2021-08-25 VITALS
SYSTOLIC BLOOD PRESSURE: 111 MMHG | HEART RATE: 65 BPM | OXYGEN SATURATION: 98 % | RESPIRATION RATE: 16 BRPM | DIASTOLIC BLOOD PRESSURE: 55 MMHG | TEMPERATURE: 98.1 F

## 2021-08-25 DIAGNOSIS — M54.2 NECK PAIN: ICD-10-CM

## 2021-08-25 DIAGNOSIS — S06.0X0A CONCUSSION WITHOUT LOSS OF CONSCIOUSNESS, INITIAL ENCOUNTER: ICD-10-CM

## 2021-08-25 DIAGNOSIS — S09.90XA INJURY OF HEAD, INITIAL ENCOUNTER: Primary | ICD-10-CM

## 2021-08-25 DIAGNOSIS — R51.9 HEADACHE: ICD-10-CM

## 2021-08-25 DIAGNOSIS — R42 DIZZINESS AND GIDDINESS: ICD-10-CM

## 2021-08-25 DIAGNOSIS — R11.0 NAUSEA: ICD-10-CM

## 2021-08-25 PROCEDURE — 96372 THER/PROPH/DIAG INJ SC/IM: CPT

## 2021-08-25 PROCEDURE — 99284 EMERGENCY DEPT VISIT MOD MDM: CPT | Performed by: EMERGENCY MEDICINE

## 2021-08-25 PROCEDURE — 70450 CT HEAD/BRAIN W/O DYE: CPT

## 2021-08-25 PROCEDURE — 99284 EMERGENCY DEPT VISIT MOD MDM: CPT

## 2021-08-25 PROCEDURE — 72125 CT NECK SPINE W/O DYE: CPT

## 2021-08-25 RX ORDER — ONDANSETRON 4 MG/1
4 TABLET, ORALLY DISINTEGRATING ORAL ONCE
Status: COMPLETED | OUTPATIENT
Start: 2021-08-25 | End: 2021-08-25

## 2021-08-25 RX ORDER — KETOROLAC TROMETHAMINE 30 MG/ML
15 INJECTION, SOLUTION INTRAMUSCULAR; INTRAVENOUS ONCE
Status: COMPLETED | OUTPATIENT
Start: 2021-08-25 | End: 2021-08-25

## 2021-08-25 RX ADMIN — ONDANSETRON 4 MG: 4 TABLET, ORALLY DISINTEGRATING ORAL at 12:57

## 2021-08-25 RX ADMIN — KETOROLAC TROMETHAMINE 15 MG: 30 INJECTION, SOLUTION INTRAMUSCULAR at 12:57
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
## 2021-08-25 NOTE — DISCHARGE INSTRUCTIONS
You did not have any acute fracture or bleed seen on your head or neck CT  You are showing signs of a concussion  Please see attached form for basic information on concussion  If you have prolonged symptoms, we recommend following up with our concussion clinic  Contact information is provided
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
## 2021-08-25 NOTE — Clinical Note
Natalia Axel was seen and treated in our emergency department on 8/25/2021  Diagnosis:     Alejandrina Avendaño    She may return on this date: It is recommended that [address] stay home for work for the next 24 hours due to symptoms  She may follow up with occupational health any time this week  If you have any questions or concerns, please don't hesitate to call        Jesica Bhandari DO    ______________________________           _______________          _______________  Hospital Representative                              Date                                Time

## 2021-08-30 ENCOUNTER — OCCMED (OUTPATIENT)
Dept: URGENT CARE | Facility: CLINIC | Age: 41
End: 2021-08-30
Payer: OTHER MISCELLANEOUS

## 2021-08-30 ENCOUNTER — HOSPITAL ENCOUNTER (OUTPATIENT)
Dept: NON INVASIVE DIAGNOSTICS | Facility: CLINIC | Age: 41
Discharge: HOME | End: 2021-08-30
Payer: COMMERCIAL

## 2021-08-30 DIAGNOSIS — R07.9 CHEST PAIN, UNSPECIFIED TYPE: ICD-10-CM

## 2021-08-30 DIAGNOSIS — S09.90XD INJURY OF HEAD, SUBSEQUENT ENCOUNTER: Primary | ICD-10-CM

## 2021-08-30 PROCEDURE — 93017 CV STRESS TEST TRACING ONLY: CPT

## 2021-08-30 PROCEDURE — 93016 CV STRESS TEST SUPVJ ONLY: CPT | Performed by: INTERNAL MEDICINE

## 2021-08-30 PROCEDURE — 93018 CV STRESS TEST I&R ONLY: CPT | Performed by: INTERNAL MEDICINE

## 2021-08-30 PROCEDURE — 99213 OFFICE O/P EST LOW 20 MIN: CPT | Performed by: PHYSICIAN ASSISTANT

## 2021-09-02 ENCOUNTER — IMMUNIZATIONS (OUTPATIENT)
Dept: FAMILY MEDICINE CLINIC | Facility: MEDICAL CENTER | Age: 41
End: 2021-09-02

## 2021-09-02 DIAGNOSIS — Z23 ENCOUNTER FOR IMMUNIZATION: Primary | ICD-10-CM

## 2021-09-02 PROCEDURE — 91301 SARS-COV-2 / COVID-19 MRNA VACCINE (MODERNA) 100 MCG: CPT

## 2021-09-07 ENCOUNTER — TELEPHONE (OUTPATIENT)
Dept: FAMILY MEDICINE CLINIC | Facility: CLINIC | Age: 41
End: 2021-09-07

## 2021-09-07 ENCOUNTER — CONSULT (OUTPATIENT)
Dept: CARDIOLOGY CLINIC | Facility: CLINIC | Age: 41
End: 2021-09-07
Payer: COMMERCIAL

## 2021-09-07 VITALS
WEIGHT: 279 LBS | SYSTOLIC BLOOD PRESSURE: 108 MMHG | OXYGEN SATURATION: 98 % | HEART RATE: 66 BPM | DIASTOLIC BLOOD PRESSURE: 72 MMHG | BODY MASS INDEX: 43.7 KG/M2

## 2021-09-07 DIAGNOSIS — R94.39 ABNORMAL STRESS ECG WITH TREADMILL: ICD-10-CM

## 2021-09-07 DIAGNOSIS — R07.9 CHEST PAIN, UNSPECIFIED TYPE: Primary | ICD-10-CM

## 2021-09-07 DIAGNOSIS — R94.39 ABNORMAL STRESS TEST: ICD-10-CM

## 2021-09-07 PROCEDURE — 93000 ELECTROCARDIOGRAM COMPLETE: CPT | Performed by: INTERNAL MEDICINE

## 2021-09-07 PROCEDURE — 99204 OFFICE O/P NEW MOD 45 MIN: CPT | Performed by: INTERNAL MEDICINE

## 2021-09-07 NOTE — PROGRESS NOTES
OP Consultation - Cardiology    Boogie Rosales 38 y o  female MRN: [id number]    Physician Requesting Consult: Lucile Meckel, [address]    Reason for Consult / Chief Complaint: Chest pain    HPI:     38year old woman with a past medical history of rheumatoid arthritis who presents for follow up abnormal stress test      Patient initially had chest pain 2 weeks ago  Pain was substernal and radiated to her left arm  Pain can last from a few seconds to a few minutes  Pain is off and on - occurs a couple times a week  Can occur at both rest and exertion  However, usually on exertion  Not sure if its related to her breathing  She is scheduled for a nuclear stress test later this month  Last CP episode was 2 days ago  Lasted a second  This was at rest after her COVID booster shot  She also has SOB on exertion  Social History:  Does not smoke  Denies alcohol intake  Works for a Non-profit organization  Works as a  and is at [employer]  Exercise stress test 8/30/2021:     -  Stress results: Duration of exercise was 5 min  Target heart rate was achieved  The patient experienced typical chest pain during stress; pain resolved spontaneously  -  ECG conclusions: The stress ECG was negative for ischemia  There were no stress arrhythmias or conduction abnormalities      IMPRESSIONS: Abnormal study after maximal exercise    Patient experienced anginal chest pain at peak exercise including shortness of breath  decreased excercise capacity  Stress ekg negative for ischemic changes  no arrythmia  further evalaution is recommended    FAMILY HISTORY:  Family History   Problem Relation Age of Onset    Rheum arthritis Mother     COPD Mother     Seizures Mother     No Known Problems Father     No Known Problems Sister     Hypertension Maternal Grandmother     Lung cancer Maternal Grandmother     Heart disease Maternal Grandmother     Heart disease Maternal Grandfather     Emphysema Maternal Grandfather  No Known Problems Paternal Grandmother     Breast cancer Maternal Aunt 47    No Known Problems Sister     No Known Problems Sister     No Known Problems Sister     No Known Problems Maternal Aunt     No Known Problems Paternal Aunt     No Known Problems Paternal Aunt     No Known Problems Paternal Aunt     No Known Problems Paternal Aunt     Colon cancer Neg Hx     Ovarian cancer Neg Hx     Cervical cancer Neg Hx     Uterine cancer Neg Hx         MEDS & ALLERGIES:  No Known Allergies      Current Outpatient Medications:     albuterol (PROVENTIL HFA,VENTOLIN HFA) 90 mcg/act inhaler, Inhale 2 puffs every 6 (six) hours as needed for wheezing or shortness of breath, Disp: 1 Inhaler, Rfl: 5    ARIPiprazole (ABILIFY) 5 mg tablet, Take 5 mg by mouth daily, Disp: , Rfl:     ascorbic Acid (VITAMIN C) 500 MG CPCR, Take 500 mg by mouth daily, Disp: , Rfl:     busPIRone (BUSPAR) 10 mg tablet, Take 1 tablet (10 mg total) by mouth 3 (three) times a day (Patient taking differently: Take 10 mg by mouth 2 (two) times a day ), Disp: 90 tablet, Rfl: 0    Cholecalciferol (VITAMIN D-3) 5000 units TABS, Take 5,000 Units by mouth, Disp: , Rfl:     folic acid (FOLVITE) 1 mg tablet, , Disp: , Rfl:     hydrOXYzine pamoate (VISTARIL) 25 mg capsule, Take 1 capsule by mouth 2 (two) times a day, Disp: , Rfl:     levothyroxine 175 mcg tablet, Take 175 mcg by mouth daily, Disp: , Rfl:     methotrexate 2 5 mg tablet, Take 5 tablets by mouth once a week, Disp: , Rfl:     pantoprazole (PROTONIX) 40 mg tablet, Take 1 tablet (40 mg total) by mouth daily, Disp: 31 tablet, Rfl: 11    prazosin (MINIPRESS) 1 mg capsule, Take 2 mg by mouth daily at bedtime, Disp: , Rfl:     sertraline (ZOLOFT) 50 mg tablet, TAKE ONE TABLET BY MOUTH EVERY DAY, Disp: 30 tablet, Rfl: 3    tiZANidine (ZANAFLEX) 4 mg tablet, Take 1 tablet (4 mg total) by mouth daily at bedtime as needed for muscle spasms, Disp: 15 tablet, Rfl: 0    traZODone (DESYREL) 50 mg tablet, Take 1 tablet by mouth daily as needed, Disp: , Rfl:     Past Medical History:   Diagnosis Date    Arthritis     RA    Colon polyp     NEO (obstructive sleep apnea)     Sleep apnea     Thyroid cancer ([facility] [address] )     Varicella          Review of Systems:  Review of Systems   Constitutional: Negative  Negative for chills and fatigue  Respiratory: Positive for chest tightness and shortness of breath  Negative for cough  Cardiovascular: Positive for chest pain  Negative for palpitations and leg swelling  Gastrointestinal: Negative for nausea and vomiting  Neurological: Negative for dizziness, weakness and light-headedness  All other systems reviewed and are negative  PHYSICAL EXAM:  Vitals:   Vitals:    09/07/21 1034   BP: 108/72   BP Location: Left arm   Patient Position: Sitting   Cuff Size: Standard   Pulse: 66   SpO2: 98%   Weight: 127 kg (279 lb)        Physical Exam:  Physical Exam  Vitals and nursing note reviewed  Constitutional:       General: She is not in acute distress  Appearance: Normal appearance  She is not ill-appearing or diaphoretic  HENT:      Head: Normocephalic and atraumatic  Eyes:      Extraocular Movements: Extraocular movements intact  Cardiovascular:      Rate and Rhythm: Normal rate and regular rhythm  Heart sounds: No murmur heard  No friction rub  No gallop  Pulmonary:      Effort: No respiratory distress  Breath sounds: Normal breath sounds  Abdominal:      General: There is no distension  Palpations: Abdomen is soft  Musculoskeletal:         General: No swelling  Normal range of motion  Cervical back: Normal range of motion  Skin:     General: Skin is dry  Capillary Refill: Capillary refill takes less than 2 seconds  Coloration: Skin is pale  Neurological:      General: No focal deficit present  Mental Status: She is alert and oriented to person, place, and time     Psychiatric: Mood and Affect: Mood normal          LABORATORY RESULTS:    Lab Results   Component Value Date    WBC 7 20 08/23/2021    HGB 12 0 08/23/2021    HCT 37 3 08/23/2021    MCV 91 08/23/2021     08/23/2021     Lab Results   Component Value Date    CALCIUM 8 5 08/23/2021    K 4 2 08/23/2021    CO2 26 08/23/2021     08/23/2021    BUN 17 08/23/2021    CREATININE 1 13 08/23/2021     Lab Results   Component Value Date    HGBA1C 5 6 05/28/2019       Lipid Profile:   No results found for: CHOL  Lab Results   Component Value Date    HDL 36 (L) 10/02/2020     Lab Results   Component Value Date    LDLCALC 115 (H) 10/02/2020     Lab Results   Component Value Date    TRIG 143 10/02/2020       The 10-year ASCVD risk score (Edgar Valderrama et al , 2013) is: 0 8%    Values used to calculate the score:      Age: 38 years      Sex: Female      Is Non- : No      Diabetic: No      Tobacco smoker: No      Systolic Blood Pressure: 439 mmHg      Is BP treated: No      HDL Cholesterol: 36 mg/dL      Total Cholesterol: 180 mg/dL    Imaging: I have personally reviewed pertinent reports  No results found  EKG reviewed personally: Normal sinus rhytm    Assessment and Plan:    Tosha Restrepo was seen today for follow-up  Diagnoses and all orders for this visit:    Chest pain, unspecified type    Abnormal stress test  -     Ambulatory referral to Cardiology         Patient presents with atypical chest pain  Exercise stress test was borderline abnormal> She had chest pain during stress but no ECG changes reported  She is scheduled for a nuclear stress test later this month  I have discussed both invasive and non-invasive assessment and management options for CAD  Discussed indication, rosk, benefit and alternatives for coronary angiography  Following detailed discussion, patient prefers to do stress testing first      She will inform us if she has worsenign symptoms     Previous studies were reviewed  Safety measures were reviewed  Questions were entertained and answered  Patient was advised to report any problems requiring medical attention  Follow-up with PCP and appropriate specialist and lab work as discussed  Return for follow up visit as scheduled or earlier, if needed  Thank you for allowing me to participate in the care and evaluation of your patient  Should you have any questions, please feel free to contact me      Return to clinic in 4 or PRN    Cristian Moore MD  9/7/2021,10:46 AM

## 2021-09-07 NOTE — TELEPHONE ENCOUNTER
Needs Auth done for her nuclear stress test     Test is on 9/20/2021  Bill under [employer] NPI [id number]

## 2021-09-20 ENCOUNTER — HOSPITAL ENCOUNTER (OUTPATIENT)
Dept: NON INVASIVE DIAGNOSTICS | Facility: CLINIC | Age: 41
Discharge: HOME | End: 2021-09-20
Payer: COMMERCIAL

## 2021-09-20 DIAGNOSIS — R94.39 ABNORMAL STRESS TEST: ICD-10-CM

## 2021-09-20 PROCEDURE — 78452 HT MUSCLE IMAGE SPECT MULT: CPT | Performed by: INTERNAL MEDICINE

## 2021-09-20 PROCEDURE — A9502 TC99M TETROFOSMIN: HCPCS

## 2021-09-20 PROCEDURE — 78452 HT MUSCLE IMAGE SPECT MULT: CPT

## 2021-09-20 PROCEDURE — 93018 CV STRESS TEST I&R ONLY: CPT | Performed by: INTERNAL MEDICINE

## 2021-09-20 PROCEDURE — 93017 CV STRESS TEST TRACING ONLY: CPT

## 2021-09-20 PROCEDURE — G1004 CDSM NDSC: HCPCS

## 2021-09-20 PROCEDURE — 93016 CV STRESS TEST SUPVJ ONLY: CPT | Performed by: INTERNAL MEDICINE

## 2021-09-20 RX ADMIN — REGADENOSON 0.4 MG: 0.08 INJECTION, SOLUTION INTRAVENOUS at 09:26

## 2021-09-22 NOTE — PATIENT INSTRUCTIONS
Breast Self Exam for Women   WHAT YOU NEED TO KNOW:   What is a breast self-exam (BSE)? A BSE is a way to check your breasts for lumps and other changes  Regular BSEs can help you know how your breasts normally look and feel  Most breast lumps or changes are not cancer, but you should always have them checked by a healthcare provider  Your healthcare provider can also watch you do a BSE and can tell you if you are doing your BSE correctly  Why should I do a BSE? Breast cancer is the most common type of cancer in women  Even if you have mammograms, you may still want to do a BSE regularly  If you know how your breasts normally feel and look, it may help you know when to contact your healthcare provider  Mammograms can miss some cancers  You may find a lump during a BSE that did not show up on a mammogram   When should I do a BSE? If you have periods, you may want to do your BSE 1 week after your period ends  This is the time when your breasts may be the least swollen, lumpy, or tender  You can do regular BSEs even if you are breastfeeding or have breast implants  How should I do a BSE? · Look at your breasts in a mirror  Look at the size and shape of each breast and nipple  Check for swelling, lumps, dimpling, scaly skin, or other skin changes  Look for nipple changes, such as a nipple that is painful or beginning to pull inward  Gently squeeze both nipples and check to see if fluid (that is not breast milk) comes out of them  If you find any of these or other breast changes, contact your healthcare provider  Check your breasts while you sit or  the following 3 positions:    ? Hang your arms down at your sides  ? Raise your hands and join them behind your head  ? Put firm pressure with your hands on your hips  Bend slightly forward while you look at your breasts in the mirror  · Lie down and feel your breasts    When you lie down, your breast tissue spreads out evenly over your chest  This makes it easier for you to feel for lumps and anything that may not be normal for your breasts  Do a BSE on one breast at a time  ? Place a small pillow or towel under your left shoulder  Put your left arm behind your head  ? Use the 3 middle fingers of your right hand  Use your fingertip pads, on the top of your fingers  Your fingertip pad is the most sensitive part of your finger  ? Use small circles to feel your breast tissue  Use your fingertip pads to make dime-sized, overlapping circles on your breast and armpits  Use light, medium, and firm pressure  First, press lightly  Second, press with medium pressure to feel a little deeper into the breast  Last, use firm pressure to feel deep within your breast     ? Examine your entire breast area  Examine the breast area from above the breast to below the breast where you feel only ribs  Make small circles with your fingertips, starting in the middle of your armpit  Make circles going up and down the breast area  Continue toward your breast and all the way across it  Examine the area from your armpit all the way over to the middle of your chest (breastbone)  Stop at the middle of your chest     ? Move the pillow or towel to your right shoulder, and put your right arm behind your head  Use the 3 fingertip pads of your left hand, and repeat the above steps to do a BSE on your right breast   What else can I do to check for breast problems or cancer? Talk to your healthcare provider about mammograms  A mammogram is an x-ray of your breasts to screen for breast cancer or other problems  Your provider can tell you the benefits and risks of mammograms  The first mammogram is usually at age 39 or 48  Your provider may recommend you start at 36 or younger if your risk for breast cancer is high  Mammograms usually continue every 1 to 2 years until age 76  When should I call my doctor? · You find any lumps or changes in your breasts      · You have breast pain or fluid coming from your nipples  · You have questions or concerns or concerns about your condition or care  CARE AGREEMENT:   You have the right to help plan your care  Learn about your health condition and how it may be treated  Discuss treatment options with your healthcare providers to decide what care you want to receive  You always have the right to refuse treatment  The above information is an  only  It is not intended as medical advice for individual conditions or treatments  Talk to your doctor, nurse or pharmacist before following any medical regimen to see if it is safe and effective for you  © Copyright [employer] 2021 Information is for End User's use only and may not be sold, redistributed or otherwise used for commercial purposes   All illustrations and images included in CareNotes® are the copyrighted property of A [employer] A M , Inc  or [address] [place], [place] [address]ed miriam

## 2021-09-22 NOTE — PROGRESS NOTES
Diagnoses and all orders for this visit:    1  Encounter for annual routine gynecological examination   Healthy eating and nutrition, daily exercise discussed and SBE reinforced  Call with any issues and all questions and concerns addressed  2  Encounter for screening mammogram for malignant neoplasm of breast  -     Mammo screening bilateral w 3d & cad; Future    3  Skin rash  -     nystatin (MYCOSTATIN) cream; Apply topically 2 (two) times a day  Ok to apply as needed underneath breasts, keep area dry, clean and if no improvement call office  All questions and concerns answered  Call with any problems  Pleasant 38 y o  nulliparous, premenopausal female here for annual exam   She denies any issues with bleeding or her menses  Reports regular cycles  Denies history of abnormal pap smears  Denies vaginal, urinary or breast issues, today  Denies pelvic pain  Monogamous female relationship, pt declines STD testing  Denies F/C/N/V  Patient mentioned when donating blood, due to her history of autoimmune dx-RA, syphilis testing resulted as positive  But confirmatory was negative  Health Maintenance:  Last PAP:  7/12/19 PAP & HPV neg    Last Mammogram:  5/25/21    Patient's mother recently passed away from T3 MOTION in January  Patient is receiving counseling        Past Medical History:   Diagnosis Date    Arthritis     RA    Colon polyp     NEO (obstructive sleep apnea)     Sleep apnea     Thyroid cancer (HCC)     Varicella      Past Surgical History:   Procedure Laterality Date    CHOLECYSTECTOMY      COLONOSCOPY      KNEE SURGERY Left     ACL repair     KNEE SURGERY      SKIN GRAFT Right     thumb    THYROIDECTOMY       Family History   Problem Relation Age of Onset    Rheum arthritis Mother     COPD Mother     Seizures Mother     No Known Problems Father     No Known Problems Sister     Hypertension Maternal Grandmother     Lung cancer Maternal Grandmother     Heart disease Maternal Grandmother     Heart disease Maternal Grandfather     Emphysema Maternal Grandfather     No Known Problems Paternal Grandmother     Breast cancer Maternal Aunt 47    No Known Problems Sister     No Known Problems Sister     No Known Problems Sister     No Known Problems Maternal Aunt     No Known Problems Paternal Aunt     No Known Problems Paternal Aunt     No Known Problems Paternal Aunt     No Known Problems Paternal Aunt     Colon cancer Neg Hx     Ovarian cancer Neg Hx     Cervical cancer Neg Hx     Uterine cancer Neg Hx      Social History     Tobacco Use    Smoking status: Former Smoker     Quit date: 2015     Years since quittin 1    Smokeless tobacco: Never Used    Tobacco comment: quit 5 years    Vaping Use    Vaping Use: Never used   Substance Use Topics    Alcohol use: Yes     Comment: socially    Drug use: Never       Current Outpatient Medications:     albuterol (PROVENTIL HFA,VENTOLIN HFA) 90 mcg/act inhaler, Inhale 2 puffs every 6 (six) hours as needed for wheezing or shortness of breath, Disp: 1 Inhaler, Rfl: 5    ARIPiprazole (ABILIFY) 5 mg tablet, Take 5 mg by mouth daily, Disp: , Rfl:     ascorbic Acid (VITAMIN C) 500 MG CPCR, Take 500 mg by mouth daily, Disp: , Rfl:     busPIRone (BUSPAR) 10 mg tablet, Take 1 tablet (10 mg total) by mouth 3 (three) times a day (Patient taking differently: Take 10 mg by mouth 2 (two) times a day ), Disp: 90 tablet, Rfl: 0    Cholecalciferol (VITAMIN D-3) 5000 units TABS, Take 5,000 Units by mouth, Disp: , Rfl:     folic acid (FOLVITE) 1 mg tablet, , Disp: , Rfl:     hydrOXYzine pamoate (VISTARIL) 25 mg capsule, Take 1 capsule by mouth 2 (two) times a day, Disp: , Rfl:     levothyroxine 175 mcg tablet, Take 175 mcg by mouth daily, Disp: , Rfl:     methotrexate 2 5 mg tablet, Take 5 tablets by mouth once a week, Disp: , Rfl:     pantoprazole (PROTONIX) 40 mg tablet, Take 1 tablet (40 mg total) by mouth daily, Disp: 31 tablet, Rfl: 11    prazosin (MINIPRESS) 1 mg capsule, Take 2 mg by mouth daily at bedtime, Disp: , Rfl:     sertraline (ZOLOFT) 50 mg tablet, TAKE ONE TABLET BY MOUTH EVERY DAY, Disp: 30 tablet, Rfl: 3    tiZANidine (ZANAFLEX) 4 mg tablet, Take 1 tablet (4 mg total) by mouth daily at bedtime as needed for muscle spasms, Disp: 15 tablet, Rfl: 0    traZODone (DESYREL) 50 mg tablet, Take 1 tablet by mouth daily as needed, Disp: , Rfl:     nystatin (MYCOSTATIN) cream, Apply topically 2 (two) times a day, Disp: 30 g, Rfl: 2  Patient Active Problem List    Diagnosis Date Noted    Abnormal stress ECG with treadmill 2021    Chest pain 2021    Irritable bowel syndrome with diarrhea 2021    NEO (obstructive sleep apnea)     Impaired fasting glucose 2019    Menorrhagia with regular cycle 2019    Post traumatic stress disorder (PTSD) 2019    Depression with anxiety 12/10/2018    Gastroesophageal reflux disease without esophagitis 2018    Morbid obesity with BMI of 40 0-44 9, adult ([place] [address] ) 2018    Post-surgical hypothyroidism 2018    Rheumatoid arthritis involving multiple sites ([place] [address] ) 2018    Thyroid cancer ([place] [address] ) 2018       No Known Allergies    OB History    Para Term  AB Living   0 0 0 0 0 0   SAB TAB Ectopic Multiple Live Births   0 0 0 0 0       Vitals:    21 0912   BP: 114/80   BP Location: Left arm   Patient Position: Sitting   Weight: 130 kg (285 lb 12 8 oz)   Height: 5' 7" (1 702 m)     Body mass index is 44 76 kg/m²  Review of Systems   Constitutional: Negative for chills, fatigue, fever and unexpected weight change  Respiratory: Negative for shortness of breath  Gastrointestinal: Negative for anal bleeding, blood in stool, constipation and diarrhea  Genitourinary: Negative for difficulty urinating, dysuria and hematuria  Physical Exam  Constitutional:       Appearance: Normal appearance  She is well-developed  She is obese  Genitourinary:      Pelvic exam was performed with patient in the lithotomy position  Vulva, inguinal canal, urethra, vagina, cervix, uterus, right adnexa, left adnexa and rectum normal    No labial fusion  No vaginal discharge  Cardiovascular:      Rate and Rhythm: Normal rate and regular rhythm  Heart sounds: Normal heart sounds  Pulmonary:      Effort: Pulmonary effort is normal       Breath sounds: Normal breath sounds  Chest:      Breasts:         Right: No inverted nipple, mass, nipple discharge, skin change or tenderness  Left: No inverted nipple, mass, nipple discharge, skin change or tenderness  Abdominal:      Palpations: Abdomen is soft  Musculoskeletal:         General: Normal range of motion  Neurological:      Mental Status: She is alert and oriented to person, place, and time  Skin:     General: Skin is warm and dry  Psychiatric:         Behavior: Behavior normal          Thought Content: Thought content normal          Judgment: Judgment normal    Vitals and nursing note reviewed

## 2021-09-23 ENCOUNTER — ANNUAL EXAM (OUTPATIENT)
Dept: OBGYN CLINIC | Facility: CLINIC | Age: 41
End: 2021-09-23
Payer: COMMERCIAL

## 2021-09-23 VITALS
BODY MASS INDEX: 44.86 KG/M2 | DIASTOLIC BLOOD PRESSURE: 80 MMHG | WEIGHT: 285.8 LBS | SYSTOLIC BLOOD PRESSURE: 114 MMHG | HEIGHT: 67 IN

## 2021-09-23 DIAGNOSIS — Z01.419 ENCOUNTER FOR ANNUAL ROUTINE GYNECOLOGICAL EXAMINATION: Primary | ICD-10-CM

## 2021-09-23 DIAGNOSIS — Z12.31 ENCOUNTER FOR SCREENING MAMMOGRAM FOR MALIGNANT NEOPLASM OF BREAST: ICD-10-CM

## 2021-09-23 DIAGNOSIS — R21 SKIN RASH: ICD-10-CM

## 2021-09-23 LAB
MAX DIASTOLIC BP: 66 MMHG
MAX HEART RATE: 96 BPM
MAX PREDICTED HEART RATE: 179 BPM
MAX. SYSTOLIC BP: 136 MMHG
PROTOCOL NAME: NORMAL
REASON FOR TERMINATION: NORMAL
TARGET HR FORMULA: NORMAL
TIME IN EXERCISE PHASE: NORMAL

## 2021-09-23 PROCEDURE — S0612 ANNUAL GYNECOLOGICAL EXAMINA: HCPCS | Performed by: NURSE PRACTITIONER

## 2021-09-23 RX ORDER — NYSTATIN 100000 U/G
CREAM TOPICAL 2 TIMES DAILY
Qty: 30 G | Refills: 2 | Status: SHIPPED | OUTPATIENT
Start: 2021-09-23

## 2021-09-27 ENCOUNTER — OFFICE VISIT (OUTPATIENT)
Dept: FAMILY MEDICINE CLINIC | Facility: CLINIC | Age: 41
End: 2021-09-27
Payer: COMMERCIAL

## 2021-09-27 VITALS
TEMPERATURE: 98 F | HEIGHT: 67 IN | HEART RATE: 84 BPM | WEIGHT: 286.6 LBS | BODY MASS INDEX: 44.98 KG/M2 | OXYGEN SATURATION: 100 % | RESPIRATION RATE: 16 BRPM | DIASTOLIC BLOOD PRESSURE: 80 MMHG | SYSTOLIC BLOOD PRESSURE: 126 MMHG

## 2021-09-27 DIAGNOSIS — H61.23 BILATERAL IMPACTED CERUMEN: ICD-10-CM

## 2021-09-27 DIAGNOSIS — R06.02 SOB (SHORTNESS OF BREATH): ICD-10-CM

## 2021-09-27 DIAGNOSIS — E66.01 MORBID OBESITY WITH BMI OF 40.0-44.9, ADULT (HCC): ICD-10-CM

## 2021-09-27 DIAGNOSIS — Z00.00 ANNUAL PHYSICAL EXAM: Primary | ICD-10-CM

## 2021-09-27 DIAGNOSIS — Z23 NEED FOR IMMUNIZATION AGAINST INFLUENZA: ICD-10-CM

## 2021-09-27 DIAGNOSIS — M06.9 RHEUMATOID ARTHRITIS INVOLVING MULTIPLE SITES, UNSPECIFIED WHETHER RHEUMATOID FACTOR PRESENT (HCC): ICD-10-CM

## 2021-09-27 DIAGNOSIS — F41.8 DEPRESSION WITH ANXIETY: ICD-10-CM

## 2021-09-27 DIAGNOSIS — C73 THYROID CANCER (HCC): ICD-10-CM

## 2021-09-27 DIAGNOSIS — R07.9 CHEST PAIN, UNSPECIFIED TYPE: ICD-10-CM

## 2021-09-27 PROCEDURE — 99396 PREV VISIT EST AGE 40-64: CPT | Performed by: NURSE PRACTITIONER

## 2021-09-27 PROCEDURE — 90471 IMMUNIZATION ADMIN: CPT | Performed by: NURSE PRACTITIONER

## 2021-09-27 PROCEDURE — 3008F BODY MASS INDEX DOCD: CPT | Performed by: NURSE PRACTITIONER

## 2021-09-27 PROCEDURE — 1036F TOBACCO NON-USER: CPT | Performed by: NURSE PRACTITIONER

## 2021-09-27 PROCEDURE — 90682 RIV4 VACC RECOMBINANT DNA IM: CPT | Performed by: NURSE PRACTITIONER

## 2021-09-27 RX ORDER — ALBUTEROL SULFATE 90 UG/1
2 AEROSOL, METERED RESPIRATORY (INHALATION) EVERY 6 HOURS PRN
Qty: 8 G | Refills: 0 | Status: SHIPPED | OUTPATIENT
Start: 2021-09-27

## 2021-09-27 NOTE — PROGRESS NOTES
[place] [id number] [facility]    NAME: Dahiana Salcedo  AGE: 38 y o  SEX: female  : 1980     DATE: 2021     Assessment and Plan:     Problem List Items Addressed This Visit        Endocrine    Thyroid cancer ([facility] [address] )       To continue current dose of levothyroxine  To continue care with endocrinology  Musculoskeletal and Integument    Rheumatoid arthritis involving multiple sites [facility])       To continue methotrexate and folic acid  To continue care with Rheumatology  Other    Morbid obesity with BMI of 40 0-44 9, adult ([facility] [address] )       Counseled the importance of healthy food choices, daily physical activity, and reducing BMI  Depression with anxiety       Stable  To continue current dose of trazodone, Zoloft, prazosin, hydroxyzine, and Abilify  To continue counseling weekly and continue care with psychiatry  Annual physical exam - Primary      Advised to make an appointment for eye exam          Chest pain      Chest pain improving  Recent nuclear stress test negative  for ischemia  Other Visit Diagnoses     SOB (shortness of breath)        Relevant Medications    albuterol (PROVENTIL HFA,VENTOLIN HFA) 90 mcg/act inhaler    Bilateral impacted cerumen         To begin using Debrox nightly  Follow-up in 1 week for ear lavage  Relevant Medications    carbamide peroxide (DEBROX) 6 5 % otic solution    Need for immunization against influenza        Relevant Orders    influenza vaccine, quadrivalent, recombinant, PF, 0 5 mL, for patients 18 yr+ (FLUBLOK) (Completed)          Immunizations and preventive care screenings were discussed with patient today  Appropriate education was printed on patient's after visit summary      Counseling:  Alcohol/drug use: discussed moderation in alcohol intake, the recommendations for healthy alcohol use, and avoidance of illicit drug use  Dental Health: discussed importance of regular tooth brushing, flossing, and dental visits  Injury prevention: discussed safety/seat belts, safety helmets, smoke detectors, carbon dioxide detectors, and smoking near bedding or upholstery  Sexual health: discussed sexually transmitted diseases, partner selection, use of condoms, avoidance of unintended pregnancy, and contraceptive alternatives  · Exercise: the importance of regular exercise/physical activity was discussed  Recommend exercise 3-5 times per week for at least 30 minutes  Return in about 1 week (around 10/4/2021) for ear lavage  Chief Complaint:     Chief Complaint   Patient presents with    Physical Exam      History of Present Illness:     Adult Annual Physical   Patient here for a comprehensive physical exam  The patient reports no problems  Diet and Physical Activity  · Diet/Nutrition: poor diet  · Exercise: walking and 3-4 times a week on average  Depression Screening  PHQ-9 Depression Screening    PHQ-9:   Frequency of the following problems over the past two weeks:           General Health  · Sleep: gets 7-8 hours of sleep on average  · Hearing: decreased - left  · Vision: no vision problems and most recent eye exam >1 year ago  · Dental: regular dental visits  /GYN Health  · Patient is: premenopausal  · LMP: 9/13/21  · Contraceptive method: none  Review of Systems:     Review of Systems   Constitutional: Negative  HENT: Negative  Eyes: Negative  Respiratory: Positive for shortness of breath (on exertion)  Cardiovascular: Positive for chest pain (improving)  Gastrointestinal: Negative  Endocrine: Positive for heat intolerance  Genitourinary: Negative  Musculoskeletal: Negative  Skin: Negative  Allergic/Immunologic: Negative  Neurological: Negative  Psychiatric/Behavioral: The patient is nervous/anxious (improving)         Past Medical History:     Past Medical History:   Diagnosis Date    Arthritis     RA    Colon polyp     NEO (obstructive sleep apnea)     Sleep apnea     Thyroid cancer (HCC)     Varicella       Past Surgical History:     Past Surgical History:   Procedure Laterality Date    CHOLECYSTECTOMY      COLONOSCOPY      KNEE SURGERY Left     ACL repair     KNEE SURGERY      SKIN GRAFT Right     thumb    THYROIDECTOMY        Social History:     Social History     Socioeconomic History    Marital status: Unknown     Spouse name: None    Number of children: None    Years of education: None    Highest education level: None   Occupational History    None   Tobacco Use    Smoking status: Former Smoker     Quit date: 2015     Years since quittin 1    Smokeless tobacco: Never Used    Tobacco comment: quit 5 years    Vaping Use    Vaping Use: Never used   Substance and Sexual Activity    Alcohol use: Yes     Comment: socially    Drug use: Never    Sexual activity: Yes     Partners: Female     Birth control/protection: None   Other Topics Concern    None   Social History Narrative    None     Social Determinants of Health     Financial Resource Strain:     Difficulty of Paying Living Expenses:    Food Insecurity:     Worried About Running Out of Food in the Last Year:     Ran Out of Food in the Last Year:    Transportation Needs:     Lack of Transportation (Medical):      Lack of Transportation (Non-Medical):    Physical Activity:     Days of Exercise per Week:     Minutes of Exercise per Session:    Stress:     Feeling of Stress :    Social Connections:     Frequency of Communication with Friends and Family:     Frequency of Social Gatherings with Friends and Family:     Attends Islam Services:     Active Member of Clubs or Organizations:     Attends Club or Organization Meetings:     Marital Status:    Intimate Partner Violence:     Fear of Current or Ex-Partner:     Emotionally Abused:     Physically Abused:     Sexually Abused:       Family History:     Family History   Problem Relation Age of Onset    Rheum arthritis Mother     COPD Mother     Seizures Mother     No Known Problems Father     No Known Problems Sister     Hypertension Maternal Grandmother     Lung cancer Maternal Grandmother     Heart disease Maternal Grandmother     Heart disease Maternal Grandfather     Emphysema Maternal Grandfather     No Known Problems Paternal Grandmother     Breast cancer Maternal Aunt 47    No Known Problems Sister     No Known Problems Sister     No Known Problems Sister     No Known Problems Maternal Aunt     No Known Problems Paternal Aunt     No Known Problems Paternal Aunt     No Known Problems Paternal Aunt     No Known Problems Paternal Aunt     Colon cancer Neg Hx     Ovarian cancer Neg Hx     Cervical cancer Neg Hx     Uterine cancer Neg Hx       Current Medications:     Current Outpatient Medications   Medication Sig Dispense Refill    albuterol (PROVENTIL HFA,VENTOLIN HFA) 90 mcg/act inhaler Inhale 2 puffs every 6 (six) hours as needed for wheezing or shortness of breath 8 g 0    ARIPiprazole (ABILIFY) 5 mg tablet Take 5 mg by mouth daily      ascorbic Acid (VITAMIN C) 500 MG CPCR Take 500 mg by mouth daily      busPIRone (BUSPAR) 10 mg tablet Take 1 tablet (10 mg total) by mouth 3 (three) times a day (Patient taking differently: Take 10 mg by mouth 2 (two) times a day ) 90 tablet 0    Cholecalciferol (VITAMIN D-3) 5000 units TABS Take 5,000 Units by mouth      folic acid (FOLVITE) 1 mg tablet       hydrOXYzine pamoate (VISTARIL) 25 mg capsule Take 1 capsule by mouth 2 (two) times a day      levothyroxine 175 mcg tablet Take 175 mcg by mouth daily      methotrexate 2 5 mg tablet Take 5 tablets by mouth once a week      pantoprazole (PROTONIX) 40 mg tablet Take 1 tablet (40 mg total) by mouth daily 31 tablet 11    prazosin (MINIPRESS) 1 mg capsule Take 2 mg by mouth daily at bedtime      sertraline (ZOLOFT) 50 mg tablet TAKE ONE TABLET BY MOUTH EVERY DAY 30 tablet 3    tiZANidine (ZANAFLEX) 4 mg tablet Take 1 tablet (4 mg total) by mouth daily at bedtime as needed for muscle spasms 15 tablet 0    traZODone (DESYREL) 50 mg tablet Take 1 tablet by mouth daily as needed      carbamide peroxide (DEBROX) 6 5 % otic solution Administer 5 drops into both ears 2 (two) times a day 15 mL 0    nystatin (MYCOSTATIN) cream Apply topically 2 (two) times a day 30 g 2     No current facility-administered medications for this visit  Allergies:     No Known Allergies   Physical Exam:     /80   Pulse 84   Temp 98 °F (36 7 °C)   Resp 16   Ht 5' 7" (1 702 m)   Wt 130 kg (286 lb 9 6 oz)   SpO2 100%   BMI 44 89 kg/m²     Physical Exam  Vitals and nursing note reviewed  Constitutional:       General: She is not in acute distress  Appearance: Normal appearance  She is well-developed  She is obese  She is not ill-appearing, toxic-appearing or diaphoretic  HENT:      Head: Normocephalic and atraumatic  Right Ear: Tympanic membrane and external ear normal  There is impacted cerumen  Left Ear: Tympanic membrane and external ear normal  There is impacted cerumen  Nose: Nose normal       Mouth/Throat:      Mouth: Mucous membranes are moist       Dentition: Has dentures (  Upper)  Pharynx: Oropharynx is clear  No oropharyngeal exudate  Eyes:      Extraocular Movements: Extraocular movements intact  Conjunctiva/sclera: Conjunctivae normal       Pupils: Pupils are equal, round, and reactive to light  Neck:      Thyroid: No thyromegaly  Cardiovascular:      Rate and Rhythm: Normal rate and regular rhythm  Heart sounds: Normal heart sounds  No murmur heard  Pulmonary:      Effort: Pulmonary effort is normal  No respiratory distress  Breath sounds: Normal breath sounds  No stridor  No wheezing or rales  Chest:      Chest wall: No tenderness  Abdominal:      General: Bowel sounds are normal  There is no distension  Palpations: Abdomen is soft  There is no mass  Tenderness: There is no abdominal tenderness  There is no guarding or rebound  Hernia: No hernia is present  Musculoskeletal:         General: Normal range of motion  Cervical back: Normal range of motion and neck supple  Skin:     Capillary Refill: Capillary refill takes less than 2 seconds  Neurological:      General: No focal deficit present  Mental Status: She is alert and oriented to person, place, and time  Cranial Nerves: No cranial nerve deficit  Psychiatric:         Mood and Affect: Mood normal          Behavior: Behavior normal          Thought Content:  Thought content normal          Judgment: Judgment normal           Madison Kraft, [address] [facility]

## 2021-09-27 NOTE — ASSESSMENT & PLAN NOTE
Stable  To continue current dose of trazodone, Zoloft, prazosin, hydroxyzine, and Abilify  To continue counseling weekly and continue care with psychiatry

## 2021-09-27 NOTE — PATIENT INSTRUCTIONS

## 2021-11-02 ENCOUNTER — OFFICE VISIT (OUTPATIENT)
Dept: PULMONOLOGY | Facility: CLINIC | Age: 41
End: 2021-11-02
Payer: COMMERCIAL

## 2021-11-02 VITALS
SYSTOLIC BLOOD PRESSURE: 120 MMHG | BODY MASS INDEX: 45.52 KG/M2 | HEART RATE: 77 BPM | WEIGHT: 290 LBS | HEIGHT: 67 IN | DIASTOLIC BLOOD PRESSURE: 76 MMHG | OXYGEN SATURATION: 97 %

## 2021-11-02 DIAGNOSIS — E66.01 MORBID OBESITY (HCC): ICD-10-CM

## 2021-11-02 DIAGNOSIS — G47.19 EXCESSIVE DAYTIME SLEEPINESS: ICD-10-CM

## 2021-11-02 DIAGNOSIS — G47.33 OSA (OBSTRUCTIVE SLEEP APNEA): Primary | ICD-10-CM

## 2021-11-02 PROCEDURE — 99214 OFFICE O/P EST MOD 30 MIN: CPT | Performed by: INTERNAL MEDICINE

## 2021-11-03 ENCOUNTER — CONSULT (OUTPATIENT)
Dept: BARIATRICS | Facility: CLINIC | Age: 41
End: 2021-11-03
Payer: COMMERCIAL

## 2021-11-03 VITALS
RESPIRATION RATE: 12 BRPM | HEART RATE: 87 BPM | BODY MASS INDEX: 45.99 KG/M2 | DIASTOLIC BLOOD PRESSURE: 60 MMHG | WEIGHT: 293 LBS | HEIGHT: 67 IN | SYSTOLIC BLOOD PRESSURE: 98 MMHG | TEMPERATURE: 97.6 F

## 2021-11-03 DIAGNOSIS — R63.5 ABNORMAL WEIGHT GAIN: ICD-10-CM

## 2021-11-03 DIAGNOSIS — F41.8 DEPRESSION WITH ANXIETY: ICD-10-CM

## 2021-11-03 DIAGNOSIS — M06.9 RHEUMATOID ARTHRITIS INVOLVING MULTIPLE SITES, UNSPECIFIED WHETHER RHEUMATOID FACTOR PRESENT (HCC): ICD-10-CM

## 2021-11-03 DIAGNOSIS — E66.01 MORBID OBESITY (HCC): Primary | ICD-10-CM

## 2021-11-03 DIAGNOSIS — G47.33 OSA (OBSTRUCTIVE SLEEP APNEA): ICD-10-CM

## 2021-11-03 DIAGNOSIS — R73.01 IMPAIRED FASTING GLUCOSE: ICD-10-CM

## 2021-11-03 DIAGNOSIS — E89.0 POST-SURGICAL HYPOTHYROIDISM: ICD-10-CM

## 2021-11-03 PROCEDURE — 99204 OFFICE O/P NEW MOD 45 MIN: CPT | Performed by: INTERNAL MEDICINE

## 2021-11-03 RX ORDER — SERTRALINE HYDROCHLORIDE 100 MG/1
100 TABLET, FILM COATED ORAL DAILY
COMMUNITY
Start: 2021-10-21

## 2021-11-08 ENCOUNTER — OFFICE VISIT (OUTPATIENT)
Dept: CARDIOLOGY CLINIC | Facility: CLINIC | Age: 41
End: 2021-11-08
Payer: COMMERCIAL

## 2021-11-08 VITALS
BODY MASS INDEX: 45.67 KG/M2 | HEART RATE: 93 BPM | WEIGHT: 291 LBS | HEIGHT: 67 IN | RESPIRATION RATE: 16 BRPM | SYSTOLIC BLOOD PRESSURE: 124 MMHG | OXYGEN SATURATION: 97 % | DIASTOLIC BLOOD PRESSURE: 74 MMHG

## 2021-11-08 DIAGNOSIS — R07.9 CHEST PAIN, UNSPECIFIED TYPE: Primary | ICD-10-CM

## 2021-11-08 PROCEDURE — 3008F BODY MASS INDEX DOCD: CPT | Performed by: INTERNAL MEDICINE

## 2021-11-08 PROCEDURE — 1036F TOBACCO NON-USER: CPT | Performed by: INTERNAL MEDICINE

## 2021-11-08 PROCEDURE — 99213 OFFICE O/P EST LOW 20 MIN: CPT | Performed by: INTERNAL MEDICINE

## 2021-11-26 ENCOUNTER — HOSPITAL ENCOUNTER (OUTPATIENT)
Dept: ULTRASOUND IMAGING | Facility: CLINIC | Age: 41
Discharge: HOME | End: 2021-11-26
Payer: COMMERCIAL

## 2021-11-26 DIAGNOSIS — R92.8 ABNORMAL MAMMOGRAM: ICD-10-CM

## 2021-11-26 PROCEDURE — 76642 ULTRASOUND BREAST LIMITED: CPT

## 2022-02-13 DIAGNOSIS — R10.13 DYSPEPSIA: ICD-10-CM

## 2022-02-14 RX ORDER — PANTOPRAZOLE SODIUM 40 MG/1
TABLET, DELAYED RELEASE ORAL
Qty: 31 TABLET | Refills: 11 | Status: SHIPPED | OUTPATIENT
Start: 2022-02-14

## 2022-03-17 ENCOUNTER — TELEPHONE (OUTPATIENT)
Dept: GASTROENTEROLOGY | Facility: CLINIC | Age: 42
End: 2022-03-17

## 2022-03-17 NOTE — TELEPHONE ENCOUNTER
Coco patient - Scheduled an appt next week with Jan De La Vega , however patient is having rectal bleeding and would like to know if there is something she can do until her appt  Please RTRN call to [phone]    Thx

## 2022-03-17 NOTE — TELEPHONE ENCOUNTER
FAIZA: Spoke with patient  History of IBS-D, GERD, liver lesion    Patient c/o BRBPR for 3 days  Passing normal formed BM daily  Denies n/v, fever, chills, SOB, weakness, black stools  She will follow-up next week in OV  She is going to utilize prepH suppositories OTC until her OV

## 2022-03-23 ENCOUNTER — OFFICE VISIT (OUTPATIENT)
Dept: GASTROENTEROLOGY | Facility: CLINIC | Age: 42
End: 2022-03-23
Payer: COMMERCIAL

## 2022-03-23 VITALS
BODY MASS INDEX: 44.89 KG/M2 | DIASTOLIC BLOOD PRESSURE: 78 MMHG | SYSTOLIC BLOOD PRESSURE: 120 MMHG | OXYGEN SATURATION: 98 % | WEIGHT: 286 LBS | HEIGHT: 67 IN | HEART RATE: 70 BPM | TEMPERATURE: 98 F | RESPIRATION RATE: 16 BRPM

## 2022-03-23 DIAGNOSIS — K60.2 ANAL FISSURE: Primary | ICD-10-CM

## 2022-03-23 DIAGNOSIS — K62.5 RECTAL BLEEDING: ICD-10-CM

## 2022-03-23 PROCEDURE — 99213 OFFICE O/P EST LOW 20 MIN: CPT | Performed by: PHYSICIAN ASSISTANT

## 2022-03-23 PROCEDURE — 3008F BODY MASS INDEX DOCD: CPT | Performed by: PHYSICIAN ASSISTANT

## 2022-03-23 PROCEDURE — 1036F TOBACCO NON-USER: CPT | Performed by: PHYSICIAN ASSISTANT

## 2022-03-23 NOTE — LETTER
March 23, 2022     Daeon Clyde, [address] [facility] [address] [facility] [address]  [address]    Patient: Kehinde Conway   YOB: 1980   Date of Visit: 3/23/2022       Dear Dr Shields Parents: Thank you for referring Kehinde Conway to me for evaluation  Below are my notes for this consultation  If you have questions, please do not hesitate to call me  I look forward to following your patient along with you  Sincerely,        Nga Whitehead PA-C        CC: No Recipients  Tanisha Lynn  3/23/2022 10:40 AM  Sign when Signing Visit  [facility] - Outpatient Follow-up Note  Kehinde Conway 42 y o  female MRN: [id number]  Encounter: [id number]          ASSESSMENT AND PLAN:      1  Anal fissure  2  Rectal bleeding  -Will start nitroglycerin ointment for anal fissure   -Will start fiber and probiotic daily   -Will plan colonoscopy at this time     -Recent CBC is normal   -ESR 21  -CRP 12      -Follow-up after colonoscopy   ______________________________________________________________________    SUBJECTIVE:    69-year-old female presents the office today for GI follow-up  Patient reports for the past week or so she has been suffering with rectal bleeding and rectal irritation  Patient does have a history of precancerous polyps, benign polyps, and an anal fissure  Patient reports she is having 3-5 bowel movements a day  She is reporting soft stool with no straining  Patient reports bright red blood per rectum with occasional dark blood mixed in her stool  She denies any nausea or vomiting  She denies any recent travel or sick contacts  She reports that she has lost 17 lb but she is doing weight watchers  She is also reporting abdominal pain  She denies any family history of inflammatory bowel disease  Patient most recently had blood work this suggest that her CBC is normal but her inflammatory markers are slightly elevated    Patient's last colonoscopy from 2019 was reviewed she did have 2 flat polyps removed at that time  Pathology was benign  REVIEW OF SYSTEMS IS OTHERWISE NEGATIVE        Historical Information   Past Medical History:   Diagnosis Date    Arthritis     RA    Colon polyp     NEO (obstructive sleep apnea)     Sleep apnea     Thyroid cancer (HCC)     Varicella      Past Surgical History:   Procedure Laterality Date    CHOLECYSTECTOMY      COLONOSCOPY      KNEE SURGERY Left     ACL repair     KNEE SURGERY      SKIN GRAFT Right     thumb    THYROIDECTOMY       Social History   Social History     Substance and Sexual Activity   Alcohol Use Yes    Comment: socially     Social History     Substance and Sexual Activity   Drug Use Never     Social History     Tobacco Use   Smoking Status Former Smoker    Packs/day: 0 50    Years: 22 00    Pack years: 11 00    Start date: 45    Quit date: 2015    Years since quittin 6   Smokeless Tobacco Never Used   Tobacco Comment    quit 5 years      Family History   Problem Relation Age of Onset    Rheum arthritis Mother     COPD Mother     Seizures Mother     No Known Problems Father     No Known Problems Sister     Hypertension Maternal Grandmother     Lung cancer Maternal Grandmother     Heart disease Maternal Grandmother     Heart disease Maternal Grandfather     Emphysema Maternal Grandfather     No Known Problems Paternal Grandmother     Breast cancer Maternal Aunt 47    No Known Problems Sister     No Known Problems Sister     No Known Problems Sister     No Known Problems Maternal Aunt     No Known Problems Paternal Aunt     No Known Problems Paternal Aunt     No Known Problems Paternal Aunt     No Known Problems Paternal Aunt     Colon cancer Neg Hx     Ovarian cancer Neg Hx     Cervical cancer Neg Hx     Uterine cancer Neg Hx        Meds/Allergies       Current Outpatient Medications:     albuterol (PROVENTIL HFA,VENTOLIN HFA) 90 mcg/act inhaler   ARIPiprazole (ABILIFY) 5 mg tablet    ascorbic Acid (VITAMIN C) 500 MG CPCR    busPIRone (BUSPAR) 10 mg tablet    Cholecalciferol (VITAMIN D-3) 5000 units TABS    folic acid (FOLVITE) 1 mg tablet    hydrOXYzine pamoate (VISTARIL) 25 mg capsule    levothyroxine 175 mcg tablet    methotrexate 2 5 mg tablet    nystatin (MYCOSTATIN) cream    pantoprazole (PROTONIX) 40 mg tablet    prazosin (MINIPRESS) 1 mg capsule    sertraline (ZOLOFT) 100 mg tablet    tiZANidine (ZANAFLEX) 4 mg tablet    traZODone (DESYREL) 50 mg tablet    nitroglycerin (NITRO-BID) 2 % ointment    No Known Allergies        Objective     Blood pressure 120/78, pulse 70, temperature 98 °F (36 7 °C), temperature source Temporal, resp  rate 16, height 5' 7" (1 702 m), weight 130 kg (286 lb), SpO2 98 %  Body mass index is 44 79 kg/m²  PHYSICAL EXAM:      General Appearance:   Alert, cooperative, no distress   HEENT:   Normocephalic, atraumatic, anicteric      Neck:  Supple, symmetrical, trachea midline   Lungs:   Clear to auscultation bilaterally; no rales, rhonchi or wheezing; respirations unlabored    Heart[de-identified]   Regular rate and rhythm; no murmur, rub, or gallop  Abdomen:   Soft, non-tender, non-distended; normal bowel sounds; no masses, no organomegaly    Genitalia:   Deferred    Rectal:   Deferred    Extremities:  No cyanosis, clubbing or edema    Pulses:  2+ and symmetric    Skin:  No jaundice, rashes, or lesions    Lymph nodes:  No palpable cervical lymphadenopathy        Lab Results:   No visits with results within 1 Day(s) from this visit  Latest known visit with results is:   Hospital Outpatient Visit on 09/20/2021   Component Date Value    Protocol Name 09/20/2021 LEXISCAN-SIT     Time In Exercise Phase 09/20/2021 00:03:00     MAX   SYSTOLIC BP 03/44/6553 654     Max Diastolic Bp 26/83/1150 66     Max Heart Rate 09/20/2021 96     Max Predicted Heart Rate 09/20/2021 179     Reason for Termination 09/20/2021 Protocol Complete     Test Indication 09/20/2021                      Value:ABNORMAL STRESS TEST  CHEST PAIN      Target Hr Formular 09/20/2021 (220 - Age)*85%          Radiology Results:   No results found

## 2022-03-23 NOTE — PROGRESS NOTES
[facility] - Outpatient Follow-up Note  Darci Mcallister 42 y o  female MRN: [id number]  Encounter: [id number]          ASSESSMENT AND PLAN:      1  Anal fissure  2  Rectal bleeding  -Will start nitroglycerin ointment for anal fissure   -Will start fiber and probiotic daily   -Will plan colonoscopy at this time     -Recent CBC is normal   -ESR 21  -CRP 12      -Follow-up after colonoscopy   ______________________________________________________________________    SUBJECTIVE:    48-year-old female presents the office today for GI follow-up  Patient reports for the past week or so she has been suffering with rectal bleeding and rectal irritation  Patient does have a history of precancerous polyps, benign polyps, and an anal fissure  Patient reports she is having 3-5 bowel movements a day  She is reporting soft stool with no straining  Patient reports bright red blood per rectum with occasional dark blood mixed in her stool  She denies any nausea or vomiting  She denies any recent travel or sick contacts  She reports that she has lost 17 lb but she is doing weight watchers  She is also reporting abdominal pain  She denies any family history of inflammatory bowel disease  Patient most recently had blood work this suggest that her CBC is normal but her inflammatory markers are slightly elevated  Patient's last colonoscopy from 2019 was reviewed she did have 2 flat polyps removed at that time  Pathology was benign  REVIEW OF SYSTEMS IS OTHERWISE NEGATIVE        Historical Information   Past Medical History:   Diagnosis Date    Arthritis     RA    Colon polyp     NEO (obstructive sleep apnea)     Sleep apnea     Thyroid cancer ([facility] [address] )     Varicella      Past Surgical History:   Procedure Laterality Date    CHOLECYSTECTOMY      COLONOSCOPY      KNEE SURGERY Left     ACL repair     KNEE SURGERY      SKIN GRAFT Right     thumb    THYROIDECTOMY       Social History Social History     Substance and Sexual Activity   Alcohol Use Yes    Comment: socially     Social History     Substance and Sexual Activity   Drug Use Never     Social History     Tobacco Use   Smoking Status Former Smoker    Packs/day: 0 50    Years: 22 00    Pack years: 11 00    Start date: 45    Quit date: 2015    Years since quittin 6   Smokeless Tobacco Never Used   Tobacco Comment    quit 5 years      Family History   Problem Relation Age of Onset    Rheum arthritis Mother     COPD Mother     Seizures Mother     No Known Problems Father     No Known Problems Sister     Hypertension Maternal Grandmother     Lung cancer Maternal Grandmother     Heart disease Maternal Grandmother     Heart disease Maternal Grandfather     Emphysema Maternal Grandfather     No Known Problems Paternal Grandmother     Breast cancer Maternal Aunt 47    No Known Problems Sister     No Known Problems Sister     No Known Problems Sister     No Known Problems Maternal Aunt     No Known Problems Paternal Aunt     No Known Problems Paternal Aunt     No Known Problems Paternal Aunt     No Known Problems Paternal Aunt     Colon cancer Neg Hx     Ovarian cancer Neg Hx     Cervical cancer Neg Hx     Uterine cancer Neg Hx        Meds/Allergies       Current Outpatient Medications:     albuterol (PROVENTIL HFA,VENTOLIN HFA) 90 mcg/act inhaler    ARIPiprazole (ABILIFY) 5 mg tablet    ascorbic Acid (VITAMIN C) 500 MG CPCR    busPIRone (BUSPAR) 10 mg tablet    Cholecalciferol (VITAMIN D-3) 5000 units TABS    folic acid (FOLVITE) 1 mg tablet    hydrOXYzine pamoate (VISTARIL) 25 mg capsule    levothyroxine 175 mcg tablet    methotrexate 2 5 mg tablet    nystatin (MYCOSTATIN) cream    pantoprazole (PROTONIX) 40 mg tablet    prazosin (MINIPRESS) 1 mg capsule    sertraline (ZOLOFT) 100 mg tablet    tiZANidine (ZANAFLEX) 4 mg tablet    traZODone (DESYREL) 50 mg tablet    nitroglycerin (NITRO-BID) 2 % ointment    No Known Allergies        Objective     Blood pressure 120/78, pulse 70, temperature 98 °F (36 7 °C), temperature source Temporal, resp  rate 16, height 5' 7" (1 702 m), weight 130 kg (286 lb), SpO2 98 %  Body mass index is 44 79 kg/m²  PHYSICAL EXAM:      General Appearance:   Alert, cooperative, no distress   HEENT:   Normocephalic, atraumatic, anicteric      Neck:  Supple, symmetrical, trachea midline   Lungs:   Clear to auscultation bilaterally; no rales, rhonchi or wheezing; respirations unlabored    Heart[de-identified]   Regular rate and rhythm; no murmur, rub, or gallop  Abdomen:   Soft, non-tender, non-distended; normal bowel sounds; no masses, no organomegaly    Genitalia:   Deferred    Rectal:   Deferred    Extremities:  No cyanosis, clubbing or edema    Pulses:  2+ and symmetric    Skin:  No jaundice, rashes, or lesions    Lymph nodes:  No palpable cervical lymphadenopathy        Lab Results:   No visits with results within 1 Day(s) from this visit  Latest known visit with results is:   Hospital Outpatient Visit on 09/20/2021   Component Date Value    Protocol Name 09/20/2021 LEXISCAN-SIT     Time In Exercise Phase 09/20/2021 00:03:00     MAX  SYSTOLIC BP 43/66/1502 851     Max Diastolic Bp 46/03/2091 66     Max Heart Rate 09/20/2021 96     Max Predicted Heart Rate 09/20/2021 179     Reason for Termination 09/20/2021 Protocol Complete     Test Indication 09/20/2021                      Value:ABNORMAL STRESS TEST  CHEST PAIN      Target Hr Formular 09/20/2021 (220 - Age)*85%          Radiology Results:   No results found

## 2022-03-23 NOTE — PATIENT INSTRUCTIONS
Scheduled date of colonoscopy (as of today):4/19/22  Physician performing colonoscopy:stephanie  Location of colonoscopy:[place]  Bowel prep reviewed with patient:miralax/dulcolax  Instructions reviewed with patient by:Abril BOSE  Clearances: none  Hemorrhoids   WHAT YOU NEED TO KNOW:   Hemorrhoids are swollen blood vessels inside your rectum (internal hemorrhoids) or on your anus (external hemorrhoids)  Sometimes a hemorrhoid may prolapse  This means it extends out of your anus  DISCHARGE INSTRUCTIONS:   Return to the emergency department if:   · You have severe pain in your rectum or around your anus  · You have severe pain in your abdomen and you are vomiting  · You have bleeding from your anus that soaks through your underwear  Contact your healthcare provider if:   · You have frequent and painful bowel movements  · Your hemorrhoid looks or feels more swollen than usual      · You do not have a bowel movement for 2 days or more  · You see or feel tissue coming through your anus  · You have questions or concerns about your condition or care  Medicines: You may  need any of the following:  · A pad, cream, or ointment  can help decrease pain, swelling, and itching  · Stool softeners  help treat or prevent constipation  · NSAIDs , such as ibuprofen, help decrease swelling, pain, and fever  NSAIDs can cause stomach bleeding or kidney problems in certain people  If you take blood thinner medicine, always ask your healthcare provider if NSAIDs are safe for you  Always read the medicine label and follow directions  · Take your medicine as directed  Contact your healthcare provider if you think your medicine is not helping or if you have side effects  Tell him or her if you are allergic to any medicine  Keep a list of the medicines, vitamins, and herbs you take  Include the amounts, and when and why you take them  Bring the list or the pill bottles to follow-up visits   Carry your medicine list with you in case of an emergency  Manage your symptoms:   · Apply ice on your anus for 15 to 20 minutes every hour or as directed  Use an ice pack, or put crushed ice in a plastic bag  Cover it with a towel before you apply it to your anus  Ice helps prevent tissue damage and decreases swelling and pain  · Take a sitz bath  Fill a bathtub with 4 to 6 inches of warm water  You may also use a sitz bath pan that fits inside a toilet bowl  Sit in the sitz bath for 15 minutes  Do this 3 times a day, and after each bowel movement  The warm water can help decrease pain and swelling  · Keep your anal area clean  Gently wash the area with warm water daily  Soap may irritate the area  After a bowel movement, wipe with moist towelettes or wet toilet paper  Dry toilet paper can irritate the area  Prevent hemorrhoids:   · Do not strain to have a bowel movement  Do not sit on the toilet too long  These actions can increase pressure on the tissues in your rectum and anus  · Drink plenty of liquids  Liquids can help prevent constipation  Ask how much liquid to drink each day and which liquids are best for you  · Eat a variety of high-fiber foods  Examples include fruits, vegetables, and whole grains  Ask your healthcare provider how much fiber you need each day  You may need to take a fiber supplement  · Exercise as directed  Exercise, such as walking, may make it easier to have a bowel movement  Ask your healthcare provider to help you create an exercise plan  · Do not have anal sex  Anal sex can weaken the skin around your rectum and anus  · Avoid heavy lifting  This can cause straining and increase your risk for another hemorrhoid  Follow up with your doctor as directed:  Write down your questions so you remember to ask them during your visits     © Copyright [employer] 2022 Information is for End User's use only and may not be sold, redistributed or otherwise used for commercial purposes  All illustrations and images included in CareNotes® are the copyrighted property of A D A M , Inc  or Mariama Maravilla  The above information is an  only  It is not intended as medical advice for individual conditions or treatments  Talk to your doctor, nurse or pharmacist before following any medical regimen to see if it is safe and effective for you

## 2022-04-19 ENCOUNTER — ANESTHESIA (OUTPATIENT)
Dept: GASTROENTEROLOGY | Facility: HOSPITAL | Age: 42
End: 2022-04-19

## 2022-04-19 ENCOUNTER — HOSPITAL ENCOUNTER (OUTPATIENT)
Dept: GASTROENTEROLOGY | Facility: HOSPITAL | Age: 42
Setting detail: OUTPATIENT SURGERY
Discharge: HOME | End: 2022-04-19
Payer: COMMERCIAL

## 2022-04-19 ENCOUNTER — ANESTHESIA EVENT (OUTPATIENT)
Dept: GASTROENTEROLOGY | Facility: HOSPITAL | Age: 42
End: 2022-04-19

## 2022-04-19 VITALS
TEMPERATURE: 97.6 F | HEART RATE: 62 BPM | HEIGHT: 67 IN | RESPIRATION RATE: 16 BRPM | OXYGEN SATURATION: 99 % | BODY MASS INDEX: 43.88 KG/M2 | SYSTOLIC BLOOD PRESSURE: 110 MMHG | DIASTOLIC BLOOD PRESSURE: 60 MMHG | WEIGHT: 279.54 LBS

## 2022-04-19 DIAGNOSIS — K62.5 RECTAL BLEEDING: ICD-10-CM

## 2022-04-19 DIAGNOSIS — K60.2 ANAL FISSURE: ICD-10-CM

## 2022-04-19 LAB
EXT PREGNANCY TEST URINE: NEGATIVE
EXT. CONTROL: NORMAL

## 2022-04-19 PROCEDURE — 81025 URINE PREGNANCY TEST: CPT | Performed by: ANESTHESIOLOGY

## 2022-04-19 PROCEDURE — 45378 DIAGNOSTIC COLONOSCOPY: CPT | Performed by: INTERNAL MEDICINE

## 2022-04-19 RX ORDER — SODIUM CHLORIDE, SODIUM LACTATE, POTASSIUM CHLORIDE, CALCIUM CHLORIDE 600; 310; 30; 20 MG/100ML; MG/100ML; MG/100ML; MG/100ML
INJECTION, SOLUTION INTRAVENOUS CONTINUOUS PRN
Status: DISCONTINUED | OUTPATIENT
Start: 2022-04-19 | End: 2022-04-19

## 2022-04-19 RX ORDER — PROPOFOL 10 MG/ML
INJECTION, EMULSION INTRAVENOUS AS NEEDED
Status: DISCONTINUED | OUTPATIENT
Start: 2022-04-19 | End: 2022-04-19

## 2022-04-19 RX ORDER — LIDOCAINE HYDROCHLORIDE 10 MG/ML
INJECTION, SOLUTION EPIDURAL; INFILTRATION; INTRACAUDAL; PERINEURAL AS NEEDED
Status: DISCONTINUED | OUTPATIENT
Start: 2022-04-19 | End: 2022-04-19

## 2022-04-19 RX ADMIN — PROPOFOL 20 MG: 10 INJECTION, EMULSION INTRAVENOUS at 11:22

## 2022-04-19 RX ADMIN — PROPOFOL 30 MG: 10 INJECTION, EMULSION INTRAVENOUS at 11:18

## 2022-04-19 RX ADMIN — PROPOFOL 20 MG: 10 INJECTION, EMULSION INTRAVENOUS at 11:24

## 2022-04-19 RX ADMIN — SODIUM CHLORIDE, SODIUM LACTATE, POTASSIUM CHLORIDE, AND CALCIUM CHLORIDE: .6; .31; .03; .02 INJECTION, SOLUTION INTRAVENOUS at 11:00

## 2022-04-19 RX ADMIN — PROPOFOL 20 MG: 10 INJECTION, EMULSION INTRAVENOUS at 11:16

## 2022-04-19 RX ADMIN — PROPOFOL 30 MG: 10 INJECTION, EMULSION INTRAVENOUS at 11:20

## 2022-04-19 RX ADMIN — LIDOCAINE HYDROCHLORIDE 20 MG: 10 INJECTION, SOLUTION EPIDURAL; INFILTRATION; INTRACAUDAL; PERINEURAL at 11:13

## 2022-04-19 RX ADMIN — PROPOFOL 150 MG: 10 INJECTION, EMULSION INTRAVENOUS at 11:13

## 2022-04-19 RX ADMIN — PROPOFOL 30 MG: 10 INJECTION, EMULSION INTRAVENOUS at 11:14

## 2022-04-19 NOTE — ANESTHESIA POSTPROCEDURE EVALUATION
Post-Op Assessment Note    CV Status:  Stable  Pain Score: 0    Pain management: adequate     Mental Status:  Arousable and sleepy   Hydration Status:  Euvolemic   PONV Controlled:  Controlled   Airway Patency:  Patent      Post Op Vitals Reviewed: Yes      Staff: CRNA         No complications documented      BP   112/60   Temp      Pulse 70   Resp 18   SpO2 98% RA

## 2022-04-19 NOTE — ANESTHESIA PREPROCEDURE EVALUATION
Procedure:  COLONOSCOPY    Relevant Problems   CARDIO   (+) Chest pain      ENDO   (+) Post-surgical hypothyroidism      GI/HEPATIC   (+) Gastroesophageal reflux disease without esophagitis      MUSCULOSKELETAL   (+) Rheumatoid arthritis involving multiple sites (HCC)      NEURO/PSYCH   (+) Depression with anxiety   (+) Post traumatic stress disorder (PTSD)      PULMONARY   (+) NEO (obstructive sleep apnea)        Physical Exam    Airway    Mallampati score: II  TM Distance: >3 FB       Dental   upper dentures,     Cardiovascular  Rhythm: regular, Rate: normal,     Pulmonary  Pulmonary exam normal     Other Findings        Anesthesia Plan  ASA Score- 2     Anesthesia Type- IV sedation with anesthesia with ASA Monitors  Additional Monitors:   Airway Plan:           Plan Factors-Exercise tolerance (METS): >4 METS  Chart reviewed  Patient is not a current smoker  Patient not instructed to abstain from smoking on day of procedure  Patient did not smoke on day of surgery  Induction- intravenous  Postoperative Plan-     Informed Consent- Anesthetic plan and risks discussed with patient  I personally reviewed this patient with the CRNA  Discussed and agreed on the Anesthesia Plan with the CRNA  Jazmin Garrett

## 2022-04-19 NOTE — H&P
History and Physical - [facility]  Howard Schilder 42 y o  female MRN: [id number]      HPI: Howard Schilder is a 42y o  year old female who presents for anal fissure and rectal bleed      REVIEW OF SYSTEMS: Per the HPI, and otherwise unremarkable      Historical Information   Past Medical History:   Diagnosis Date    Arthritis     RA    Cancer (HCC)     Colon polyp     CPAP (continuous positive airway pressure) dependence     NEO (obstructive sleep apnea)     Sleep apnea     Thyroid cancer ([address] )     Varicella      Past Surgical History:   Procedure Laterality Date    CHOLECYSTECTOMY      COLONOSCOPY      KNEE SURGERY Left     ACL repair     KNEE SURGERY      SKIN GRAFT Right     thumb    THYROIDECTOMY       Social History   Social History     Substance and Sexual Activity   Alcohol Use Not Currently    Comment: socially     Social History     Substance and Sexual Activity   Drug Use Never     Social History     Tobacco Use   Smoking Status Former Smoker    Packs/day: 0 50    Years: 22 00    Pack years: 11 00    Start date: 45    Quit date: 2015    Years since quittin 7   Smokeless Tobacco Never Used   Tobacco Comment    quit 5 years      Family History   Problem Relation Age of Onset    Rheum arthritis Mother     COPD Mother     Seizures Mother     No Known Problems Father     No Known Problems Sister     Hypertension Maternal Grandmother     Lung cancer Maternal Grandmother     Heart disease Maternal Grandmother     Heart disease Maternal Grandfather     Emphysema Maternal Grandfather     No Known Problems Paternal Grandmother     Breast cancer Maternal Aunt 47    No Known Problems Sister     No Known Problems Sister     No Known Problems Sister     No Known Problems Maternal Aunt     No Known Problems Paternal Aunt     No Known Problems Paternal Aunt     No Known Problems Paternal Aunt     No Known Problems Paternal Aunt     Colon cancer Neg Hx     Ovarian cancer Neg Hx     Cervical cancer Neg Hx     Uterine cancer Neg Hx        Meds/Allergies     (Not in a hospital admission)      No Known Allergies    Objective     Blood pressure 112/65, pulse 78, temperature 97 7 °F (36 5 °C), temperature source Temporal, resp  rate 16, height 5' 7" (1 702 m), weight 127 kg (279 lb 8 7 oz), last menstrual period 04/17/2022, SpO2 97 %  PHYSICAL EXAM    Gen: NAD  CV: RRR  CHEST: Clear  ABD: soft, NT/ND  EXT: no edema      ASSESSMENT/PLAN:  This is a 42y o  year old female here for colonoscopy, and she is stable and optimized for her procedure

## 2022-04-19 NOTE — H&P
History and Physical - [facility]  Aleksander Mccauley 42 y o  female MRN: [id number]      HPI: Aleksander Mccauley is a 42y o  year old female who presents for evaluation anal fissure and rectal bleed      REVIEW OF SYSTEMS: Per the HPI, and otherwise unremarkable      Historical Information   Past Medical History:   Diagnosis Date    Arthritis     RA    Cancer (HCC)     Colon polyp     CPAP (continuous positive airway pressure) dependence     NEO (obstructive sleep apnea)     Sleep apnea     Thyroid cancer ([address] )     Varicella      Past Surgical History:   Procedure Laterality Date    CHOLECYSTECTOMY      COLONOSCOPY      KNEE SURGERY Left     ACL repair     KNEE SURGERY      SKIN GRAFT Right     thumb    THYROIDECTOMY       Social History   Social History     Substance and Sexual Activity   Alcohol Use Not Currently    Comment: socially     Social History     Substance and Sexual Activity   Drug Use Never     Social History     Tobacco Use   Smoking Status Former Smoker    Packs/day: 0 50    Years: 22 00    Pack years: 11 00    Start date: 45    Quit date: 2015    Years since quittin 7   Smokeless Tobacco Never Used   Tobacco Comment    quit 5 years      Family History   Problem Relation Age of Onset    Rheum arthritis Mother     COPD Mother     Seizures Mother     No Known Problems Father     No Known Problems Sister     Hypertension Maternal Grandmother     Lung cancer Maternal Grandmother     Heart disease Maternal Grandmother     Heart disease Maternal Grandfather     Emphysema Maternal Grandfather     No Known Problems Paternal Grandmother     Breast cancer Maternal Aunt 47    No Known Problems Sister     No Known Problems Sister     No Known Problems Sister     No Known Problems Maternal Aunt     No Known Problems Paternal Aunt     No Known Problems Paternal Aunt     No Known Problems Paternal Aunt     No Known Problems Paternal Aunt     Colon cancer Neg Hx     Ovarian cancer Neg Hx     Cervical cancer Neg Hx     Uterine cancer Neg Hx        Meds/Allergies     (Not in a hospital admission)      No Known Allergies    Objective     Blood pressure 112/65, pulse 78, temperature 97 7 °F (36 5 °C), temperature source Temporal, resp  rate 16, height 5' 7" (1 702 m), weight 127 kg (279 lb 8 7 oz), last menstrual period 04/17/2022, SpO2 97 %  PHYSICAL EXAM    Gen: NAD  CV: RRR  CHEST: Clear  ABD: soft, NT/ND  EXT: no edema      ASSESSMENT/PLAN:  This is a 42y o  year old female here for colonoscopy, and she is stable and optimized for her procedure

## 2022-05-19 NOTE — PROGRESS NOTES
[facility] - Outpatient Follow-up Note  Anjelica Pérez 38 y o  female MRN: [id number]  Encounter: [id number]          ASSESSMENT AND PLAN:      1  Liver lesion  Likely hemangiomas - radiologist is recommended MRI - will f/u    2  Irritable bowel syndrome with diarrhea  Improved s/p Xifaxan course  She is still taking Colace every other day as she has done for a long time  I have asked her to stop this - if diarrhea does not resolve will try Viberzi or Lotronex    3  Gastroesophageal reflux disease, unspecified whether esophagitis present  Continue Pantoprazole 40mg daily    ______________________________________________________________________    SUBJECTIVE:    50-year-old female with diarrhea predominant irritable bowel syndrome and GERD presents for follow-up  She admits that her symptoms are improved status post a 14 day Xifaxan course  She still has loose stools  She remains on Colace 100 mg every other day  She is unsure why she is still taking this but knows historically she has been constipated  She denies any recurrent episodes of rectal bleeding  She has no severe abdominal pain  Her acid reflux remains controlled on pantoprazole 40 mg daily  She denies dysphagia, hematemesis or melena  Her weight is stable  REVIEW OF SYSTEMS IS OTHERWISE NEGATIVE        Historical Information   Past Medical History:   Diagnosis Date    Arthritis     RA    Colon polyp     NEO (obstructive sleep apnea)     Sleep apnea     Thyroid cancer [address] [address]     Varicella      Past Surgical History:   Procedure Laterality Date    CHOLECYSTECTOMY      COLONOSCOPY      KNEE SURGERY Left     ACL repair     KNEE SURGERY      SKIN GRAFT Right     thumb    THYROIDECTOMY       Social History   Social History     Substance and Sexual Activity   Alcohol Use Yes    Frequency: 2-4 times a month    Comment: socially     Social History     Substance and Sexual Activity   Drug Use Never Social History     Tobacco Use   Smoking Status Former Smoker    Quit date: 2015    Years since quittin 6   Smokeless Tobacco Never Used   Tobacco Comment    quit 5 years      Family History   Problem Relation Age of Onset    Rheum arthritis Mother     COPD Mother     Seizures Mother     No Known Problems Father     Rheum arthritis Sister     Fibromyalgia Sister     Thyroid disease Sister     Hypertension Maternal Grandmother     Lung cancer Maternal Grandmother     Heart disease Maternal Grandmother     Heart disease Maternal Grandfather     Emphysema Maternal Grandfather     Breast cancer Maternal Aunt 47    Colon cancer Neg Hx     Ovarian cancer Neg Hx     Cervical cancer Neg Hx     Uterine cancer Neg Hx        Meds/Allergies       Current Outpatient Medications:     albuterol (PROVENTIL HFA,VENTOLIN HFA) 90 mcg/act inhaler    ascorbic Acid (VITAMIN C) 500 MG CPCR    busPIRone (BUSPAR) 5 mg tablet    Cholecalciferol (VITAMIN D-3) 5000 units TABS    dicyclomine (BENTYL) 20 mg tablet    folic acid (FOLVITE) 1 mg tablet    levothyroxine 200 mcg tablet    methotrexate (RHEUMATREX) 2 5 MG tablet    methotrexate 2 5 mg tablet    pantoprazole (PROTONIX) 40 mg tablet    sertraline (ZOLOFT) 50 mg tablet    No Known Allergies        Objective     Blood pressure 110/78, pulse 87, height 5' 7" (1 702 m), weight 124 kg (273 lb 12 8 oz)  Body mass index is 42 88 kg/m²  PHYSICAL EXAM:      General Appearance:   Alert, cooperative, no distress   HEENT:   Normocephalic, atraumatic, anicteric      Neck:  Supple, symmetrical, trachea midline   Lungs:   Clear to auscultation bilaterally; no rales, rhonchi or wheezing; respirations unlabored    Heart[de-identified]   Regular rate and rhythm; no murmur, rub, or gallop     Abdomen:   Soft, non-tender, non-distended; normal bowel sounds; no masses, no organomegaly    Genitalia:   Deferred    Rectal:   Deferred    Extremities:  No cyanosis, clubbing or edema    Pulses:  2+ and symmetric    Skin:  No jaundice, rashes, or lesions    Lymph nodes:  No palpable cervical lymphadenopathy        Lab Results:   No visits with results within 1 Day(s) from this visit  Latest known visit with results is: Ancillary Orders on 11/27/2020   Component Date Value    WBC 01/04/2021 7 28     RBC 01/04/2021 4 59     Hemoglobin 01/04/2021 13 2     Hematocrit 01/04/2021 41 2     MCV 01/04/2021 90     MCH 01/04/2021 28 8     MCHC 01/04/2021 32 0     RDW 01/04/2021 13 1     MPV 01/04/2021 9 6     Platelets 05/40/2444 364     nRBC 01/04/2021 0     Neutrophils Relative 01/04/2021 73     Immat GRANS % 01/04/2021 0     Lymphocytes Relative 01/04/2021 19     Monocytes Relative 01/04/2021 6     Eosinophils Relative 01/04/2021 1     Basophils Relative 01/04/2021 1     Neutrophils Absolute 01/04/2021 5 35     Immature Grans Absolute 01/04/2021 0 02     Lymphocytes Absolute 01/04/2021 1 35     Monocytes Absolute 01/04/2021 0 41     Eosinophils Absolute 01/04/2021 0 10     Basophils Absolute 01/04/2021 0 05     Sodium 01/04/2021 139     Potassium 01/04/2021 3 9     Chloride 01/04/2021 104     CO2 01/04/2021 25     ANION GAP 01/04/2021 10     BUN 01/04/2021 14     Creatinine 01/04/2021 0 85     Glucose, Fasting 01/04/2021 121*    Calcium 01/04/2021 8 4     AST 01/04/2021 15     ALT 01/04/2021 27     Alkaline Phosphatase 01/04/2021 41*    Total Protein 01/04/2021 7 0     Albumin 01/04/2021 3 5     Total Bilirubin 01/04/2021 0 40     eGFR 01/04/2021 85     CRP 01/04/2021 13 7*    Sed Rate 01/04/2021 21*         Radiology Results:   No results found  normal

## 2022-05-26 ENCOUNTER — HOSPITAL ENCOUNTER (OUTPATIENT)
Dept: ULTRASOUND IMAGING | Facility: CLINIC | Age: 42
Discharge: HOME | End: 2022-05-26
Payer: COMMERCIAL

## 2022-05-26 ENCOUNTER — HOSPITAL ENCOUNTER (OUTPATIENT)
Dept: MAMMOGRAPHY | Facility: CLINIC | Age: 42
Discharge: HOME | End: 2022-05-26
Payer: COMMERCIAL

## 2022-05-26 VITALS — HEIGHT: 67 IN | WEIGHT: 279.98 LBS | BODY MASS INDEX: 43.94 KG/M2

## 2022-05-26 DIAGNOSIS — R92.8 MAMMOGRAM ABNORMAL: ICD-10-CM

## 2022-05-26 PROCEDURE — 76642 ULTRASOUND BREAST LIMITED: CPT

## 2022-05-26 PROCEDURE — G0279 TOMOSYNTHESIS, MAMMO: HCPCS

## 2022-05-26 PROCEDURE — 77066 DX MAMMO INCL CAD BI: CPT

## 2022-06-01 ENCOUNTER — TELEMEDICINE (OUTPATIENT)
Dept: FAMILY MEDICINE CLINIC | Facility: CLINIC | Age: 42
End: 2022-06-01
Payer: COMMERCIAL

## 2022-06-01 DIAGNOSIS — R05.9 COUGH: ICD-10-CM

## 2022-06-01 DIAGNOSIS — B34.9 VIRAL INFECTION, UNSPECIFIED: Primary | ICD-10-CM

## 2022-06-01 DIAGNOSIS — R09.89 RUNNY NOSE: ICD-10-CM

## 2022-06-01 PROCEDURE — 87636 SARSCOV2 & INF A&B AMP PRB: CPT

## 2022-06-01 PROCEDURE — 99442 PR PHYS/QHP TELEPHONE EVALUATION 11-20 MIN: CPT

## 2022-06-01 RX ORDER — FLUTICASONE PROPIONATE 50 MCG
1 SPRAY, SUSPENSION (ML) NASAL DAILY
Qty: 18.2 ML | Refills: 1 | Status: SHIPPED | OUTPATIENT
Start: 2022-06-01

## 2022-06-01 NOTE — LETTER
[address] [address]  [phone]  Dept: [phone]    June 1, 2022    Patient: Erika Viera  YOB: 1980    Erika Viera was seen and evaluated at our [facility]  Please note if Covid and Flu tests are negative, they may return to work when fever free for 24 hours without the use of a fever reducing agent  If Covid or Flu test is positive, they may return to work on 6/3/22, as this is 5 days from the onset of symptoms  Upon return, they must then adhere to strict masking for an additional 5 days      Sincerely,    Yoanna CRIS Hernandez

## 2022-06-01 NOTE — PROGRESS NOTES
COVID-19 Outpatient Progress Note    Assessment/Plan:    Problem List Items Addressed This Visit    None     Visit Diagnoses     Viral infection, unspecified    -  Primary    Fully vaccinated for COVID, at home COVID test 3 days post onset of symptoms was negative     Relevant Medications    fluticasone (FLONASE) 50 mcg/act nasal spray    Other Relevant Orders    Covid/Flu- Office Collect    Cough        Start taking Mucinex 2x daily     Relevant Medications    fluticasone (FLONASE) 50 mcg/act nasal spray    Other Relevant Orders    Covid/Flu- Office Collect    Runny nose        Sart using Flonase     Relevant Medications    fluticasone (FLONASE) 50 mcg/act nasal spray    Other Relevant Orders    Covid/Flu- Office Collect         Disposition:     Recommended patient to come to the office to test for COVID-19/Influenza/RSV  Patient is fully vaccinated and is not yet due for their booster shot  According to CDC guidelines, quarantine is not required after close contact exposure and they were advised to wear a mask for 10 days after the exposure  If patient were to develop symptoms, they should immediately self isolate and call our office for further guidance  Discussed symptom directed medication options with patient  Discussed vitamin D, vitamin C, and/or zinc supplementation with patient  I have spent 15 minutes directly with the patient  Greater than 50% of this time was spent in counseling/coordination of care regarding: diagnostic results, prognosis, risks and benefits of treatment options, instructions for management, patient and family education, importance of treatment compliance, risk factor reductions and impressions  Encounter provider CRIS Heck    Provider located at [facility] [phone] [address] [phone]    Recent Visits  No visits were found meeting these conditions    Showing recent visits within past 7 days and meeting all other requirements  Today's Visits  Date Type Provider Dept   06/01/22 Telemedicine CR[facility] [address]   Showing today's visits and meeting all other requirements  Future Appointments  No visits were found meeting these conditions  Showing future appointments within next 150 days and meeting all other requirements       Patient agrees to participate in a virtual check in via telephone or video visit instead of presenting to the office to address urgent/immediate medical needs  Patient is aware this is a billable service  After connecting through [place] [facility], the patient was identified by name and date of birth  Nasir Gutierrez was informed that this was a telemedicine visit and that the exam was being conducted confidentially over secure lines  My office door was closed  No one else was in the room  Nasir Gutierrez acknowledged consent and understanding of privacy and security of the telemedicine visit  I informed the patient that I have reviewed her record in Epic and presented the opportunity for her to ask any questions regarding the visit today  The patient agreed to participate  Verification of patient location:  Patient is located in the following state in which I hold an active license: [place]    Subjective:   Nasir Gutierrez is a 42 y o  female who is concerned about COVID-19  Patient's symptoms include fatigue, nasal congestion, rhinorrhea, sore throat, abdominal pain, diarrhea and headache  Patient denies fever, chills, malaise, anosmia, loss of taste, cough, shortness of breath, chest tightness, nausea, vomiting and myalgias       - Date of symptom onset: 5/28/2022      COVID-19 vaccination status: Fully vaccinated with booster    Lab Results   Component Value Date    SARSCOV2 Not Detected 06/09/2020    [facility] [facility] [address] Not Detected 06/03/2021     Past Medical History:   Diagnosis Date    Arthritis     RA    Cancer (HCC)     Colon polyp     CPAP (continuous positive airway pressure) dependence     NEO (obstructive sleep apnea)     Sleep apnea     Thyroid cancer ([facility] [address] )     Varicella      Past Surgical History:   Procedure Laterality Date    CHOLECYSTECTOMY      COLONOSCOPY      KNEE SURGERY Left     ACL repair     KNEE SURGERY      SKIN GRAFT Right     thumb    THYROIDECTOMY       Current Outpatient Medications   Medication Sig Dispense Refill    fluticasone (FLONASE) 50 mcg/act nasal spray 1 spray into each nostril daily 18 2 mL 1    albuterol (PROVENTIL HFA,VENTOLIN HFA) 90 mcg/act inhaler Inhale 2 puffs every 6 (six) hours as needed for wheezing or shortness of breath 8 g 0    ARIPiprazole (ABILIFY) 5 mg tablet Take 5 mg by mouth daily      ascorbic Acid (VITAMIN C) 500 MG CPCR Take 500 mg by mouth daily      busPIRone (BUSPAR) 10 mg tablet Take 1 tablet (10 mg total) by mouth 3 (three) times a day (Patient taking differently: Take 10 mg by mouth 2 (two) times a day ) 90 tablet 0    Cholecalciferol (VITAMIN D-3) 5000 units TABS Take 5,000 Units by mouth      folic acid (FOLVITE) 1 mg tablet       hydrOXYzine pamoate (VISTARIL) 25 mg capsule Take 1 capsule by mouth 2 (two) times a day      levothyroxine 175 mcg tablet Take 175 mcg by mouth daily      methotrexate 2 5 mg tablet Take 5 tablets by mouth once a week      nitroglycerin (NITRO-BID) 2 % ointment Apply 1 inch topically once for 1 dose 30 g 0    nystatin (MYCOSTATIN) cream Apply topically 2 (two) times a day 30 g 2    pantoprazole (PROTONIX) 40 mg tablet TAKE ONE TABLET BY MOUTH EVERY DAY 31 tablet 11    prazosin (MINIPRESS) 1 mg capsule Take 2 mg by mouth daily at bedtime      sertraline (ZOLOFT) 100 mg tablet Take 100 mg by mouth daily      tiZANidine (ZANAFLEX) 4 mg tablet Take 1 tablet (4 mg total) by mouth daily at bedtime as needed for muscle spasms 15 tablet 0    traZODone (DESYREL) 50 mg tablet Take 1 tablet by mouth daily as needed       No current facility-administered medications for this visit  No Known Allergies    Review of Systems   Constitutional: Positive for fatigue  Negative for chills and fever  HENT: Positive for congestion, rhinorrhea and sore throat  Negative for ear pain  Eyes: Negative for pain and visual disturbance  Respiratory: Negative for cough, chest tightness and shortness of breath  Cardiovascular: Negative for chest pain and palpitations  Gastrointestinal: Positive for abdominal pain and diarrhea  Negative for nausea and vomiting  Genitourinary: Negative for dysuria and hematuria  Musculoskeletal: Negative for arthralgias, back pain and myalgias  Skin: Negative for color change and rash  Neurological: Positive for headaches  Negative for seizures and syncope  All other systems reviewed and are negative  Objective: There were no vitals filed for this visit  Physical Exam  Nursing note reviewed  Constitutional:       General: She is not in acute distress  Appearance: She is not ill-appearing  HENT:      Head: Normocephalic  Pulmonary:      Effort: Pulmonary effort is normal  No respiratory distress  Neurological:      Mental Status: She is alert and oriented to person, place, and time  Psychiatric:         Mood and Affect: Mood normal          Behavior: Behavior normal          Thought Content: Thought content normal          Judgment: Judgment normal          VIRTUAL VISIT DISCLAIMER    Harley Iglesias verbally agrees to participate in [employer]  Pt is aware that [employer] could be limited without vital signs or the ability to perform a full hands-on physical exam  Vanessa Torres understands she or the provider may request at any time to terminate the video visit and request the patient to seek care or treatment in person

## 2022-06-02 LAB
FLUAV RNA RESP QL NAA+PROBE: NEGATIVE
FLUBV RNA RESP QL NAA+PROBE: NEGATIVE
SARS-COV-2 RNA RESP QL NAA+PROBE: NEGATIVE

## 2022-06-03 ENCOUNTER — OFFICE VISIT (OUTPATIENT)
Dept: FAMILY MEDICINE CLINIC | Facility: CLINIC | Age: 42
End: 2022-06-03
Payer: COMMERCIAL

## 2022-06-03 VITALS
HEIGHT: 67 IN | HEART RATE: 77 BPM | BODY MASS INDEX: 43.85 KG/M2 | TEMPERATURE: 98.1 F | DIASTOLIC BLOOD PRESSURE: 70 MMHG | SYSTOLIC BLOOD PRESSURE: 112 MMHG | OXYGEN SATURATION: 98 %

## 2022-06-03 DIAGNOSIS — J02.9 SORE THROAT: ICD-10-CM

## 2022-06-03 DIAGNOSIS — J02.0 STREP PHARYNGITIS: Primary | ICD-10-CM

## 2022-06-03 DIAGNOSIS — B37.9 ANTIBIOTIC-INDUCED YEAST INFECTION: ICD-10-CM

## 2022-06-03 DIAGNOSIS — T36.95XA ANTIBIOTIC-INDUCED YEAST INFECTION: ICD-10-CM

## 2022-06-03 LAB — S PYO AG THROAT QL: NEGATIVE

## 2022-06-03 PROCEDURE — 1036F TOBACCO NON-USER: CPT

## 2022-06-03 PROCEDURE — 99214 OFFICE O/P EST MOD 30 MIN: CPT

## 2022-06-03 PROCEDURE — 87880 STREP A ASSAY W/OPTIC: CPT

## 2022-06-03 RX ORDER — FLUCONAZOLE 150 MG/1
150 TABLET ORAL ONCE
Qty: 1 TABLET | Refills: 0 | Status: SHIPPED | OUTPATIENT
Start: 2022-06-03 | End: 2022-06-03

## 2022-06-03 RX ORDER — METHYLPREDNISOLONE 4 MG/1
TABLET ORAL
Qty: 21 EACH | Refills: 0 | Status: SHIPPED | OUTPATIENT
Start: 2022-06-03

## 2022-06-03 RX ORDER — AMOXICILLIN 500 MG/1
500 TABLET, FILM COATED ORAL 2 TIMES DAILY
Qty: 20 TABLET | Refills: 0 | Status: SHIPPED | OUTPATIENT
Start: 2022-06-03 | End: 2022-06-13

## 2022-06-03 NOTE — PATIENT INSTRUCTIONS
Pharyngitis   AMBULATORY CARE:   Pharyngitis , or sore throat, is inflammation of the tissues and structures in your pharynx (throat)  Pharyngitis is most often caused by bacteria  It may also be caused by a cold or flu virus  Other causes include smoking, allergies, or acid reflux  Signs and symptoms that may occur with pharyngitis:   Sore throat or pain when you swallow    Fever, chills, and body aches    Hoarse or raspy voice    Cough, runny or stuffy nose, itchy or watery eyes    Headache    Upset stomach and loss of appetite    Mild neck stiffness    Swollen glands that feel like hard lumps when you touch your neck    White and yellow pus-filled blisters in the back of your throat    Call 911 for any of the following: You have trouble breathing or swallowing because your throat is swollen or sore  Seek care immediately if:   You are drooling because it hurts too much to swallow  Your fever is higher than 102? F (39?C) or lasts longer than 3 days  You are confused  You taste blood in your throat  Contact your healthcare provider if:   Your throat pain gets worse  You have a painful lump in your throat that does not go away after 5 days  Your symptoms do not improve after 5 days  You have questions or concerns about your condition or care  Treatment for pharyngitis:  Viral pharyngitis will go away on its own without treatment  Your sore throat should start to feel better in 3 to 5 days for both viral and bacterial infections  You may need any of the following:  Antibiotics  treat a bacterial infection  NSAIDs , such as ibuprofen, help decrease swelling, pain, and fever  NSAIDs can cause stomach bleeding or kidney problems in certain people  If you take blood thinner medicine, always ask your healthcare provider if NSAIDs are safe for you  Always read the medicine label and follow directions  Acetaminophen  decreases pain and fever  It is available without a doctor's order  Ask how much to take and how often to take it  Follow directions  Acetaminophen can cause liver damage if not taken correctly  Manage your symptoms:   Gargle salt water  Mix ¼ teaspoon salt in an 8 ounce glass of warm water and gargle  This may help decrease swelling in your throat  Drink liquids as directed  You may need to drink more liquids than usual  Liquids may help soothe your throat and prevent dehydration  Ask how much liquid to drink each day and which liquids are best for you  Use a cool-steam humidifier  to help moisten the air in your room and calm your cough  Soothe your throat  with cough drops, ice, soft foods, or popsicles  Prevent the spread of pharyngitis:  Cover your mouth and nose when you cough or sneeze  Do not share food or drinks  Wash your hands often  Use soap and water  If soap and water are unavailable, use an alcohol based hand   Follow up with your doctor as directed:  Write down your questions so you remember to ask them during your visits  © Copyright [employer] 2022 Information is for End User's use only and may not be sold, redistributed or otherwise used for commercial purposes  All illustrations and images included in CareNotes® are the copyrighted property of A D A M , Inc  or [facility] Janina Torres   The above information is an  only  It is not intended as medical advice for individual conditions or treatments  Talk to your doctor, nurse or pharmacist before following any medical regimen to see if it is safe and effective for you

## 2022-06-03 NOTE — PROGRESS NOTES
BMI Counseling: Body mass index is 43 85 kg/m²  The BMI is above normal  Nutrition recommendations include decreasing portion sizes, encouraging healthy choices of fruits and vegetables, decreasing fast food intake, consuming healthier snacks, limiting drinks that contain sugar, moderation in carbohydrate intake, increasing intake of lean protein, reducing intake of saturated and trans fat and reducing intake of cholesterol  Exercise recommendations include moderate physical activity 150 minutes/week and exercising 3-5 times per week  Rationale for BMI follow-up plan is due to patient being overweight or obese  Assessment/Plan:    Problem List Items Addressed This Visit    None     Visit Diagnoses     Strep pharyngitis    -  Primary    Start taking amoxicillin daily for 10 days for initial the doses even if you feel better  If he has symptoms improve any have trouble breathing report to ER    Relevant Medications    amoxicillin (AMOXIL) 500 MG tablet    fluconazole (DIFLUCAN) 150 mg tablet    methylPREDNISolone 4 MG tablet therapy pack    Sore throat        Start taking steroid Dosepak to help with swelling    Relevant Medications    amoxicillin (AMOXIL) 500 MG tablet    fluconazole (DIFLUCAN) 150 mg tablet    methylPREDNISolone 4 MG tablet therapy pack    Other Relevant Orders    POCT rapid strepA (Completed)    Antibiotic-induced yeast infection        Take 1 dose of Diflucan after he finished antibiotic treatment    Relevant Medications    amoxicillin (AMOXIL) 500 MG tablet    fluconazole (DIFLUCAN) 150 mg tablet    methylPREDNISolone 4 MG tablet therapy pack            Subjective:      Patient ID: gT Osborne is a 42 y o  female  Patient presents to the office complaining of increased sore throat that started this morning  She was tested for COVID checking back negative  Sore Throat   This is a new problem  The current episode started today  The problem has been gradually worsening   Neither side of throat is experiencing more pain than the other  There has been no fever  The pain is moderate  Associated symptoms include congestion, coughing, diarrhea, headaches, a hoarse voice, neck pain, shortness of breath, swollen glands and trouble swallowing  Pertinent negatives include no abdominal pain, drooling, ear discharge, ear pain, plugged ear sensation, stridor or vomiting  She has had no exposure to strep or mono  She has tried cool liquids and acetaminophen for the symptoms  The treatment provided mild relief  The following portions of the patient's history were reviewed and updated as appropriate:   Past Medical History:  She has a past medical history of Arthritis, Cancer ([facility] ), Colon polyp, CPAP (continuous positive airway pressure) dependence, ENO (obstructive sleep apnea), Sleep apnea, Thyroid cancer ([facility] ), and Varicella  ,  _______________________________________________________________________  Medical Problems:  does not have any pertinent problems on file ,  _______________________________________________________________________  Past Surgical History:   has a past surgical history that includes Cholecystectomy; Thyroidectomy; Knee surgery (Left); Knee surgery; Colonoscopy; and Skin graft (Right)  ,  _______________________________________________________________________  Family History:  family history includes Breast cancer (age of onset: 47) in her maternal aunt; COPD in her mother; Emphysema in her maternal grandfather; Heart disease in her maternal grandfather and maternal grandmother; Hypertension in her maternal grandmother; Lung cancer in her maternal grandmother; No Known Problems in her father, maternal aunt, paternal aunt, paternal aunt, paternal aunt, paternal aunt, paternal grandmother, sister, sister, sister, and sister;  Rheum arthritis in her mother; Seizures in her mother ,  _______________________________________________________________________  Social History:   reports that she quit smoking about 6 years ago  She started smoking about 29 years ago  She has a 11 00 pack-year smoking history  She has never used smokeless tobacco  She reports previous alcohol use  She reports that she does not use drugs  ,  _______________________________________________________________________  Allergies:  has No Known Allergies     _______________________________________________________________________  Current Outpatient Medications   Medication Sig Dispense Refill    albuterol (PROVENTIL HFA,VENTOLIN HFA) 90 mcg/act inhaler Inhale 2 puffs every 6 (six) hours as needed for wheezing or shortness of breath 8 g 0    amoxicillin (AMOXIL) 500 MG tablet Take 1 tablet (500 mg total) by mouth 2 (two) times a day for 10 days 20 tablet 0    ARIPiprazole (ABILIFY) 5 mg tablet Take 5 mg by mouth daily      ascorbic Acid (VITAMIN C) 500 MG CPCR Take 500 mg by mouth daily      busPIRone (BUSPAR) 10 mg tablet Take 1 tablet (10 mg total) by mouth 3 (three) times a day (Patient taking differently: Take 10 mg by mouth 2 (two) times a day) 90 tablet 0    Cholecalciferol (VITAMIN D-3) 5000 units TABS Take 5,000 Units by mouth      fluconazole (DIFLUCAN) 150 mg tablet Take 1 tablet (150 mg total) by mouth once for 1 dose 1 tablet 0    fluticasone (FLONASE) 50 mcg/act nasal spray 1 spray into each nostril daily 34 4 mL 1    folic acid (FOLVITE) 1 mg tablet       hydrOXYzine pamoate (VISTARIL) 25 mg capsule Take 1 capsule by mouth 2 (two) times a day      levothyroxine 175 mcg tablet Take 175 mcg by mouth daily      methotrexate 2 5 mg tablet Take 5 tablets by mouth once a week      methylPREDNISolone 4 MG tablet therapy pack Use as directed on package 21 each 0    nystatin (MYCOSTATIN) cream Apply topically 2 (two) times a day 30 g 2    pantoprazole (PROTONIX) 40 mg tablet TAKE ONE TABLET BY MOUTH EVERY DAY 31 tablet 11    prazosin (MINIPRESS) 1 mg capsule Take 2 mg by mouth daily at bedtime      sertraline (ZOLOFT) 100 mg tablet Take 100 mg by mouth daily      traZODone (DESYREL) 50 mg tablet Take 1 tablet by mouth daily as needed      nitroglycerin (NITRO-BID) 2 % ointment Apply 1 inch topically once for 1 dose 30 g 0     No current facility-administered medications for this visit      _______________________________________________________________________  Review of Systems   Constitutional: Negative for chills and fever  HENT: Positive for congestion, hoarse voice, sore throat and trouble swallowing  Negative for drooling, ear discharge and ear pain  Eyes: Negative for pain and visual disturbance  Respiratory: Positive for cough and shortness of breath  Negative for stridor  Cardiovascular: Negative for chest pain and palpitations  Gastrointestinal: Positive for diarrhea  Negative for abdominal pain and vomiting  Genitourinary: Negative for dysuria and hematuria  Musculoskeletal: Positive for neck pain  Negative for arthralgias and back pain  Skin: Negative for color change and rash  Neurological: Positive for headaches  Negative for seizures and syncope  All other systems reviewed and are negative  Objective:  Vitals:    06/03/22 1523   BP: 112/70   Pulse: 77   Temp: 98 1 °F (36 7 °C)   SpO2: 98%   Height: 5' 7" (1 702 m)     Body mass index is 43 85 kg/m²  Physical Exam  Vitals and nursing note reviewed  Constitutional:       General: She is not in acute distress  Appearance: Normal appearance  She is not ill-appearing  HENT:      Head: Normocephalic  Right Ear: External ear normal       Left Ear: External ear normal       Nose: Nose normal       Mouth/Throat:      Mouth: Mucous membranes are moist       Pharynx: Pharyngeal swelling (Airway clear, no drooling) and posterior oropharyngeal erythema present  Eyes:      Conjunctiva/sclera: Conjunctivae normal    Cardiovascular:      Rate and Rhythm: Normal rate and regular rhythm        Pulses: Normal pulses  Heart sounds: Normal heart sounds  Pulmonary:      Effort: Pulmonary effort is normal  No respiratory distress  Breath sounds: Normal breath sounds  No wheezing  Abdominal:      General: Bowel sounds are normal       Palpations: Abdomen is soft  Musculoskeletal:         General: No swelling  Normal range of motion  Cervical back: Normal range of motion  Tenderness present  Right lower leg: No edema  Left lower leg: No edema  Lymphadenopathy:      Cervical: Cervical adenopathy present  Skin:     General: Skin is warm and dry  Neurological:      Mental Status: She is alert and oriented to person, place, and time  Psychiatric:         Mood and Affect: Mood normal          Behavior: Behavior normal          Thought Content:  Thought content normal          Judgment: Judgment normal

## 2022-12-23 NOTE — PATIENT INSTRUCTIONS
Gomez Beltran will be seen again next week  Joao Goodman (MD)  Neurosurgery; Pediatric Neurosurgery  [address] [place] [address], [address]  [place], [place] [id number]  Phone: [phone]  Fax: [phone]  Follow Up Time:

## 2023-02-27 DIAGNOSIS — R10.13 DYSPEPSIA: ICD-10-CM

## 2023-02-27 RX ORDER — PANTOPRAZOLE SODIUM 40 MG/1
TABLET, DELAYED RELEASE ORAL
Qty: 31 TABLET | Refills: 11 | Status: SHIPPED | OUTPATIENT
Start: 2023-02-27

## 2023-06-20 DIAGNOSIS — R21 RASH: Primary | ICD-10-CM

## 2023-06-20 RX ORDER — NYSTATIN 100000 [USP'U]/G
POWDER TOPICAL 3 TIMES DAILY
Qty: 30 G | Refills: 1 | Status: SHIPPED | OUTPATIENT
Start: 2023-06-20

## 2023-06-23 ENCOUNTER — OFFICE VISIT (OUTPATIENT)
Age: 43
End: 2023-06-23
Payer: COMMERCIAL

## 2023-06-23 VITALS
SYSTOLIC BLOOD PRESSURE: 138 MMHG | DIASTOLIC BLOOD PRESSURE: 70 MMHG | RESPIRATION RATE: 20 BRPM | BODY MASS INDEX: 46.67 KG/M2 | OXYGEN SATURATION: 96 % | HEART RATE: 100 BPM | TEMPERATURE: 97.9 F | WEIGHT: 293 LBS

## 2023-06-23 DIAGNOSIS — R05.1 ACUTE COUGH: ICD-10-CM

## 2023-06-23 DIAGNOSIS — J06.9 ACUTE URI: ICD-10-CM

## 2023-06-23 DIAGNOSIS — J02.9 ACUTE PHARYNGITIS, UNSPECIFIED ETIOLOGY: Primary | ICD-10-CM

## 2023-06-23 LAB — S PYO AG THROAT QL: NEGATIVE

## 2023-06-23 PROCEDURE — 87070 CULTURE OTHR SPECIMN AEROBIC: CPT | Performed by: PHYSICIAN ASSISTANT

## 2023-06-23 PROCEDURE — 99213 OFFICE O/P EST LOW 20 MIN: CPT | Performed by: PHYSICIAN ASSISTANT

## 2023-06-23 PROCEDURE — 87880 STREP A ASSAY W/OPTIC: CPT | Performed by: PHYSICIAN ASSISTANT

## 2023-06-23 RX ORDER — ONDANSETRON 4 MG/1
TABLET, ORALLY DISINTEGRATING ORAL
COMMUNITY
Start: 2023-03-16

## 2023-06-23 RX ORDER — LEVOTHYROXINE SODIUM 112 UG/1
224 TABLET ORAL DAILY
COMMUNITY
Start: 2023-04-27

## 2023-06-23 RX ORDER — GUAIFENESIN, PSEUDOEPHEDRINE HYDROCHLORIDE 600; 60 MG/1; MG/1
1 TABLET, EXTENDED RELEASE ORAL EVERY 12 HOURS
Qty: 20 TABLET | Refills: 0 | Status: SHIPPED | OUTPATIENT
Start: 2023-06-23

## 2023-06-23 RX ORDER — FLUTICASONE PROPIONATE 50 MCG
1 SPRAY, SUSPENSION (ML) NASAL DAILY
Qty: 9.9 ML | Refills: 0 | Status: SHIPPED | OUTPATIENT
Start: 2023-06-23

## 2023-06-23 NOTE — PATIENT INSTRUCTIONS
Cold Symptoms   WHAT YOU NEED TO KNOW:   A cold is an infection caused by a virus  The infection causes your upper respiratory system to become inflamed  Common symptoms of a cold include sneezing, dry throat, a stuffy nose, headache, watery eyes, and a cough  Your cough may be dry, or you may cough up mucus  You may also have muscle aches, joint pain, and tiredness  Rarely, you may have a fever  Most colds go away without treatment  DISCHARGE INSTRUCTIONS:   Return to the emergency department if:   You have increased tiredness and weakness  You are unable to eat  Your heart is beating much faster than usual for you  You see white spots in the back of your throat and your neck is swollen and sore to the touch  You see pinpoint or larger reddish-purple dots on your skin  Contact your healthcare provider if:   You have a fever higher than 102°F (38 9°C)  You have new or worsening shortness of breath  You have thick nasal drainage for more than 2 days  Your symptoms do not improve or get worse within 5 days  You have questions or concerns about your condition or care  Medicines: The following medicines may be suggested by your healthcare provider to decrease your cold symptoms  These medicines are available without a doctor's order  Ask which medicines to take and when to take them  Follow directions  NSAIDs or acetaminophen  help to bring down a fever or decrease pain  Decongestants  help decrease nasal stuffiness  Antihistamines  help decrease sneezing and a runny nose  Cough suppressants  help decrease how much you cough  Expectorants  help loosen mucus so you can cough it up  Take your medicine as directed  Contact your healthcare provider if you think your medicine is not helping or if you have side effects  Tell your provider if you are allergic to any medicine  Keep a list of the medicines, vitamins, and herbs you take   Include the amounts, and when and why you take them  Bring the list or the pill bottles to follow-up visits  Carry your medicine list with you in case of an emergency  Symptom relief: The following may help relieve cold symptoms, such as a dry throat and congestion:  Gargle with mouthwash or warm salt water as directed  Suck on throat lozenges or hard candy  Use a cold or warm vaporizer or humidifier to ease your breathing  Rest for at least 2 days and then as needed to decrease tiredness and weakness  Use petroleum based jelly around your nostrils to decrease irritation from blowing your nose  Drink liquids:  Liquids will help thin and loosen thick mucus so you can cough it up  Liquids will also keep you hydrated  Ask your healthcare provider which liquids are best for you and how much to drink each day  Prevent the spread of germs: You can spread your cold germs to others for at least 3 days after your symptoms start  Wash your hands often  Do not share items, such as eating utensils  Cover your nose and mouth when you cough or sneeze using the crook of your elbow instead of your hands  Throw used tissues in the garbage  Do not smoke:  Smoking may worsen your symptoms and increase the length of time you feel sick  Talk with your healthcare provider if you need help to stop smoking  Follow up with your doctor as directed:  Write down your questions so you remember to ask them during your visits  © Copyright L[employer] 2022 Information is for End User's use only and may not be sold, redistributed or otherwise used for commercial purposes  The above information is an  only  It is not intended as medical advice for individual conditions or treatments  Talk to your doctor, nurse or pharmacist before following any medical regimen to see if it is safe and effective for you  Acute Cough   WHAT YOU NEED TO KNOW:   An acute cough can last up to 3 weeks   Common causes of an acute cough include a cold, allergies, or a lung infection  DISCHARGE INSTRUCTIONS:   Return to the emergency department if:   You have trouble breathing or feel short of breath  You cough up blood, or you see blood in your mucus  You faint or feel weak or dizzy  You have chest pain when you cough or take a deep breath  You have new wheezing  Contact your healthcare provider if:   You have a fever  Your cough lasts longer than 4 weeks  Your symptoms do not improve with treatment  You have questions or concerns about your condition or care  Medicines:   Medicines  may be needed to stop the cough, decrease swelling in your airways, or help open your airways  Medicine may also be given to help you cough up mucus  Ask your healthcare provider what over-the-counter medicines you can take  If you have an infection caused by bacteria, you may need antibiotics  Take your medicine as directed  Contact your healthcare provider if you think your medicine is not helping or if you have side effects  Tell your provider if you are allergic to any medicine  Keep a list of the medicines, vitamins, and herbs you take  Include the amounts, and when and why you take them  Bring the list or the pill bottles to follow-up visits  Carry your medicine list with you in case of an emergency  Manage your symptoms:   Do not smoke and stay away from others who smoke  Nicotine and other chemicals in cigarettes and cigars can cause lung damage and make your cough worse  Ask your healthcare provider for information if you currently smoke and need help to quit  E-cigarettes or smokeless tobacco still contain nicotine  Talk to your healthcare provider before you use these products  Drink extra liquids as directed  Liquids will help thin and loosen mucus so you can cough it up  Liquids will also help prevent dehydration  Examples of good liquids to drink include water, fruit juice, and broth  Do not drink liquids that contain caffeine   Caffeine can increase your risk for dehydration  Ask your healthcare provider how much liquid to drink each day  Rest as directed  Do not do activities that make your cough worse, such as exercise  Use a humidifier or vaporizer  Use a cool mist humidifier or a vaporizer to increase air moisture in your home  This may make it easier for you to breathe and help decrease your cough  Eat 2 to 5 mL of honey 2 times each day  Honey can help thin mucus and decrease your cough  Use cough drops or lozenges  These can help decrease throat irritation and your cough  Follow up with your healthcare provider as directed:  Write down your questions so you remember to ask them during your visits  © Copyright Gabriel Don 2022 Information is for End User's use only and may not be sold, redistributed or otherwise used for commercial purposes  The above information is an  only  It is not intended as medical advice for individual conditions or treatments  Talk to your doctor, nurse or pharmacist before following any medical regimen to see if it is safe and effective for you  Pharyngitis   WHAT YOU NEED TO KNOW:   Pharyngitis, or sore throat, is inflammation of the tissues and structures in your pharynx (throat)  Pharyngitis is most often caused by bacteria or a virus  Other causes include smoking, allergies, or acid reflux  DISCHARGE INSTRUCTIONS:   Call your local emergency number (911 in the [address] [place] [address],[address]) if:   You have trouble breathing or swallowing because your throat is swollen  Return to the emergency department if:   You are drooling because it hurts too much to swallow  Your fever is higher than 102? F (39?C) or lasts longer than 3 days  You are confused  You taste blood  Call your doctor if:   Your throat pain gets worse  You have a painful lump in your throat that does not go away after 5 days  Your symptoms do not improve after 5 days      You have questions or concerns about your condition or care  Medicines:  Viral pharyngitis will go away on its own without treatment  Your sore throat should start to feel better in 3 to 5 days  You may need any of the following:  Antibiotics  treat a bacterial infection  NSAIDs  help decrease swelling and pain or fever  This medicine is available with or without a doctor's order  NSAIDs can cause stomach bleeding or kidney problems in certain people  If you take blood thinner medicine, always ask your healthcare provider if NSAIDs are safe for you  Always read the medicine label and follow directions  Acetaminophen  decreases pain and fever  It is available without a doctor's order  Ask how much to take and how often to take it  Follow directions  Read the labels of all other medicines you are using to see if they also contain acetaminophen, or ask your doctor or pharmacist  Acetaminophen can cause liver damage if not taken correctly  Take your medicine as directed  Contact your healthcare provider if you think your medicine is not helping or if you have side effects  Tell your provider if you are allergic to any medicine  Keep a list of the medicines, vitamins, and herbs you take  Include the amounts, and when and why you take them  Bring the list or the pill bottles to follow-up visits  Carry your medicine list with you in case of an emergency  Manage your symptoms:   Gargle salt water  Mix ¼ teaspoon salt in an 8 ounce glass of warm water and gargle  Do not swallow  Salt water may help decrease swelling in your throat  Drink liquids as directed  You may need to drink more liquids than usual  Liquids may help soothe your throat and prevent dehydration  Ask how much liquid to drink each day and which liquids are best for you  Use a cool mist humidifier  This will add moisture to the air and help decrease your cough  Soothe your throat  Cough drops, ice, soft foods, or popsicles may help decrease throat pain      Prevent the spread of pharyngitis:  Cover your mouth and nose when you cough or sneeze  Do not share food or drinks  Wash your hands often  Use soap and water  If soap and water are unavailable, use an alcohol-based hand   Follow up with your doctor as directed:  Write down your questions so you remember to ask them during your visits  © Copyright [facility] 2022 Information is for End User's use only and may not be sold, redistributed or otherwise used for commercial purposes  The above information is an  only  It is not intended as medical advice for individual conditions or treatments  Talk to your doctor, nurse or pharmacist before following any medical regimen to see if it is safe and effective for you

## 2023-06-23 NOTE — PROGRESS NOTES
[facility]        NAME: Reynold Townsend is a 36 y o  female  : 1980    MRN: [id number]  DATE: 2023  TIME: 12:16 PM    Assessment and Plan   Acute pharyngitis, unspecified etiology [J02 9]  1  Acute pharyngitis, unspecified etiology  POCT rapid strepA    Throat culture      2  Acute URI  pseudoephedrine-guaifenesin (MUCINEX D)  MG per tablet    fluticasone (FLONASE) 50 mcg/act nasal spray      3  Acute cough  pseudoephedrine-guaifenesin (MUCINEX D)  MG per tablet            Patient Instructions       Follow up with PCP in 3-5 days  Proceed to  ER if symptoms worsen  Chief Complaint     Chief Complaint   Patient presents with   • Cold Like Symptoms     Pt states last night she developed chest congestion and cough  Pt states mucus feels deep in chest, causing pain with breathing  Pt  States she had chills last night  Mucus is green in color  History of Present Illness       Sore Throat   This is a new problem  The current episode started yesterday  The problem has been gradually worsening  There has been no fever  The pain is moderate  Associated symptoms include congestion, coughing, headaches, swollen glands and trouble swallowing  Pertinent negatives include no abdominal pain, drooling, ear discharge, neck pain, shortness of breath or vomiting  She has had no exposure to strep or mono  She has tried acetaminophen for the symptoms  The treatment provided no relief  Review of Systems   Review of Systems   Constitutional: Negative for activity change, appetite change, fatigue and fever  HENT: Positive for congestion, sore throat and trouble swallowing  Negative for drooling and ear discharge  Eyes: Negative for photophobia and visual disturbance  Respiratory: Positive for cough  Negative for chest tightness, shortness of breath and wheezing  Gastrointestinal: Negative for abdominal pain, nausea and vomiting     Musculoskeletal: Negative for neck pain    Neurological: Positive for headaches           Current Medications       Current Outpatient Medications:   •  ARIPiprazole (ABILIFY) 5 mg tablet, Take 5 mg by mouth daily, Disp: , Rfl:   •  ascorbic Acid (VITAMIN C) 500 MG CPCR, Take 500 mg by mouth daily, Disp: , Rfl:   •  busPIRone (BUSPAR) 10 mg tablet, Take 1 tablet (10 mg total) by mouth 3 (three) times a day (Patient taking differently: Take 10 mg by mouth 2 (two) times a day), Disp: 90 tablet, Rfl: 0  •  Cholecalciferol (VITAMIN D-3) 5000 units TABS, Take 5,000 Units by mouth, Disp: , Rfl:   •  fluticasone (FLONASE) 50 mcg/act nasal spray, 1 spray into each nostril daily, Disp: 9 9 mL, Rfl: 0  •  folic acid (FOLVITE) 1 mg tablet, , Disp: , Rfl:   •  hydrOXYzine pamoate (VISTARIL) 25 mg capsule, Take 1 capsule by mouth 2 (two) times a day, Disp: , Rfl:   •  levothyroxine 112 mcg tablet, Take 224 mcg by mouth daily, Disp: , Rfl:   •  methotrexate 2 5 mg tablet, Take 5 tablets by mouth once a week, Disp: , Rfl:   •  nystatin (MYCOSTATIN) powder, Apply topically 3 (three) times a day, Disp: 30 g, Rfl: 1  •  ondansetron (ZOFRAN-ODT) 4 mg disintegrating tablet, DISSOLVE ONE TABLET BY MOUTH EVERY 4 TO 6 HOURS FOR 4 DAYS, Disp: , Rfl:   •  pantoprazole (PROTONIX) 40 mg tablet, TAKE ONE TABLET BY MOUTH EVERY DAY, Disp: 31 tablet, Rfl: 11  •  prazosin (MINIPRESS) 1 mg capsule, Take 2 mg by mouth daily at bedtime, Disp: , Rfl:   •  pseudoephedrine-guaifenesin (MUCINEX D)  MG per tablet, Take 1 tablet by mouth every 12 (twelve) hours, Disp: 20 tablet, Rfl: 0  •  sertraline (ZOLOFT) 100 mg tablet, Take 100 mg by mouth daily, Disp: , Rfl:   •  traZODone (DESYREL) 50 mg tablet, Take 1 tablet by mouth daily as needed, Disp: , Rfl:   •  albuterol (PROVENTIL HFA,VENTOLIN HFA) 90 mcg/act inhaler, Inhale 2 puffs every 6 (six) hours as needed for wheezing or shortness of breath (Patient not taking: Reported on 4/19/2023), Disp: 8 g, Rfl: 0  •  levothyroxine 175 mcg tablet, Take 175 mcg by mouth daily, Disp: , Rfl:     Current Allergies     Allergies as of 06/23/2023   • (No Known Allergies)            The following portions of the patient's history were reviewed and updated as appropriate: allergies, current medications, past family history, past medical history, past social history, past surgical history and problem list      Past Medical History:   Diagnosis Date   • Arthritis     RA   • Cancer ([place] [address] )    • Colon polyp    • CPAP (continuous positive airway pressure) dependence    • NEO (obstructive sleep apnea)    • Sleep apnea    • Thyroid cancer ([facility] )    • Varicella        Past Surgical History:   Procedure Laterality Date   • CHOLECYSTECTOMY     • COLONOSCOPY     • KNEE SURGERY Left     ACL repair    • KNEE SURGERY     • SKIN GRAFT Right     thumb   • THYROIDECTOMY         Family History   Problem Relation Age of Onset   • Rheum arthritis Mother    • COPD Mother    • Seizures Mother    • No Known Problems Father    • No Known Problems Sister    • Hypertension Maternal Grandmother    • Lung cancer Maternal Grandmother    • Heart disease Maternal Grandmother    • Heart disease Maternal Grandfather    • Emphysema Maternal Grandfather    • No Known Problems Paternal Grandmother    • Breast cancer Maternal Aunt 54   • No Known Problems Sister    • No Known Problems Sister    • No Known Problems Sister    • No Known Problems Maternal Aunt    • No Known Problems Paternal Aunt    • No Known Problems Paternal Aunt    • No Known Problems Paternal Aunt    • No Known Problems Paternal Aunt    • Colon cancer Neg Hx    • Ovarian cancer Neg Hx    • Cervical cancer Neg Hx    • Uterine cancer Neg Hx          Medications have been verified  Objective   /70   Pulse 100   Temp 97 9 °F (36 6 °C)   Resp 20   Wt 135 kg (298 lb)   SpO2 96%   BMI 46 67 kg/m²        Physical Exam     Physical Exam  Vitals and nursing note reviewed     Constitutional:       General: She is not in acute distress  Appearance: Normal appearance  She is well-developed  She is not ill-appearing  HENT:      Head: Normocephalic and atraumatic  Right Ear: Tympanic membrane, ear canal and external ear normal       Left Ear: Tympanic membrane, ear canal and external ear normal       Nose: Congestion and rhinorrhea present  Mouth/Throat:      Mouth: Mucous membranes are moist  No oral lesions  Pharynx: Oropharynx is clear  No oropharyngeal exudate  Comments: Hyperemic posterior throat with clear postnasal drip  Eyes:      Extraocular Movements: Extraocular movements intact  Right eye: Normal extraocular motion  Left eye: Normal extraocular motion  Conjunctiva/sclera: Conjunctivae normal       Pupils: Pupils are equal, round, and reactive to light  Cardiovascular:      Rate and Rhythm: Normal rate and regular rhythm  Pulses: Normal pulses  Heart sounds: Normal heart sounds  Pulmonary:      Effort: Pulmonary effort is normal  No respiratory distress  Breath sounds: Normal breath sounds  No stridor  No wheezing, rhonchi or rales  Musculoskeletal:         General: Normal range of motion  Cervical back: Normal range of motion and neck supple  No tenderness  Lymphadenopathy:      Cervical: No cervical adenopathy  Skin:     General: Skin is warm and dry  Capillary Refill: Capillary refill takes less than 2 seconds  Findings: No erythema, lesion or rash  Neurological:      General: No focal deficit present  Mental Status: She is alert and oriented to person, place, and time  Coordination: Coordination normal       Gait: Gait normal    Psychiatric:         Mood and Affect: Mood normal          Behavior: Behavior normal          Thought Content:  Thought content normal          Judgment: Judgment normal

## 2023-06-25 LAB — BACTERIA THROAT CULT: NORMAL

## 2023-07-10 ENCOUNTER — OFFICE VISIT (OUTPATIENT)
Dept: FAMILY MEDICINE CLINIC | Facility: CLINIC | Age: 43
End: 2023-07-10
Payer: COMMERCIAL

## 2023-07-10 VITALS
TEMPERATURE: 96.5 F | HEART RATE: 96 BPM | BODY MASS INDEX: 45.99 KG/M2 | OXYGEN SATURATION: 97 % | DIASTOLIC BLOOD PRESSURE: 84 MMHG | WEIGHT: 293 LBS | HEIGHT: 67 IN | SYSTOLIC BLOOD PRESSURE: 120 MMHG

## 2023-07-10 DIAGNOSIS — E89.0 POST-SURGICAL HYPOTHYROIDISM: ICD-10-CM

## 2023-07-10 DIAGNOSIS — M06.9 RHEUMATOID ARTHRITIS INVOLVING MULTIPLE SITES, UNSPECIFIED WHETHER RHEUMATOID FACTOR PRESENT (HCC): ICD-10-CM

## 2023-07-10 DIAGNOSIS — G47.33 OSA (OBSTRUCTIVE SLEEP APNEA): ICD-10-CM

## 2023-07-10 DIAGNOSIS — Z12.31 ENCOUNTER FOR SCREENING MAMMOGRAM FOR MALIGNANT NEOPLASM OF BREAST: ICD-10-CM

## 2023-07-10 DIAGNOSIS — E66.01 MORBID OBESITY WITH BMI OF 40.0-44.9, ADULT (HCC): ICD-10-CM

## 2023-07-10 DIAGNOSIS — F43.10 POST TRAUMATIC STRESS DISORDER (PTSD): ICD-10-CM

## 2023-07-10 DIAGNOSIS — C73 THYROID CANCER (HCC): ICD-10-CM

## 2023-07-10 DIAGNOSIS — Z00.00 ANNUAL PHYSICAL EXAM: Primary | ICD-10-CM

## 2023-07-10 DIAGNOSIS — F41.8 DEPRESSION WITH ANXIETY: ICD-10-CM

## 2023-07-10 PROBLEM — R07.9 CHEST PAIN: Status: RESOLVED | Noted: 2021-08-23 | Resolved: 2023-07-10

## 2023-07-10 PROCEDURE — 99214 OFFICE O/P EST MOD 30 MIN: CPT | Performed by: NURSE PRACTITIONER

## 2023-07-10 PROCEDURE — 99396 PREV VISIT EST AGE 40-64: CPT | Performed by: NURSE PRACTITIONER

## 2023-07-10 NOTE — PROGRESS NOTES
[address] [facility]    NAME: Carson Lowry  AGE: 36 y.o. SEX: female  : 1980     DATE: 7/10/2023     Assessment and Plan:     Problem List Items Addressed This Visit        Endocrine    Post-surgical hypothyroidism    Thyroid cancer ([address])     Recent TSH 83.68  To continue current dose of levothyroxine for now. Stressed the importance of repeating thyroid function testing. Continue care with endocrinology. Respiratory    NEO (obstructive sleep apnea)     To continue use of CPAP nightly. Musculoskeletal and Integument    Rheumatoid arthritis involving multiple sites ([address])     Stable. To continue folic acid and methotrexate. To continue care with rheumatology. Other    Morbid obesity with BMI of 40.0-44.9, adult ([address])    Depression with anxiety     Stable. To continue care with psychiatry. Post traumatic stress disorder (PTSD)    Annual physical exam - Primary     To obtain mammogram, make an appointment for Pap test, and eye exam.        Other Visit Diagnoses     Encounter for screening mammogram for malignant neoplasm of breast        Relevant Orders    Mammo screening bilateral w 3d & cad          Immunizations and preventive care screenings were discussed with patient today. Appropriate education was printed on patient's after visit summary. Counseling:  Alcohol/drug use: discussed moderation in alcohol intake, the recommendations for healthy alcohol use, and avoidance of illicit drug use. Dental Health: discussed importance of regular tooth brushing, flossing, and dental visits. Injury prevention: discussed safety/seat belts, safety helmets, smoke detectors, carbon dioxide detectors, and smoking near bedding or upholstery.   Sexual health: discussed sexually transmitted diseases, partner selection, use of condoms, avoidance of unintended pregnancy, and contraceptive alternatives. · Exercise: the importance of regular exercise/physical activity was discussed. Recommend exercise 3-5 times per week for at least 30 minutes. Return in about 1 year (around 7/10/2024), or if symptoms worsen or fail to improve, for Annual physical.     Chief Complaint:     Chief Complaint   Patient presents with   • Physical Exam      History of Present Illness:     Adult Annual Physical   Patient here for a comprehensive physical exam. The patient reports no problems. Diet and Physical Activity  · Diet/Nutrition: well balanced diet. · Exercise: walking. Depression Screening  PHQ-2/9 Depression Screening         General Health  · Sleep: sleeps well and gets 7-8 hours of sleep on average. · Hearing: normal - right and decreased - left. · Vision: most recent eye exam >1 year ago and difficulty reading close up. · Dental: regular dental visits. /GYN Health  · Patient is: premenopausal  · Last menstrual period: 6/26/23  · Contraceptive method:      Review of Systems:     Review of Systems   Constitutional: Negative. HENT: Negative. Eyes: Negative. Respiratory: Negative. Cardiovascular: Negative. Gastrointestinal: Negative. Endocrine: Negative. Genitourinary: Negative. Musculoskeletal: Negative. Skin: Negative. Allergic/Immunologic: Negative. Neurological: Negative. Hematological: Negative. Psychiatric/Behavioral: Negative.        Past Medical History:     Past Medical History:   Diagnosis Date   • Arthritis     RA   • Cancer (HCC)    • Colon polyp    • CPAP (continuous positive airway pressure) dependence    • NEO (obstructive sleep apnea)    • Sleep apnea    • Thyroid cancer ([address])    • Varicella       Past Surgical History:     Past Surgical History:   Procedure Laterality Date   • CHOLECYSTECTOMY     • COLONOSCOPY     • KNEE SURGERY Left     ACL repair    • KNEE SURGERY     • SKIN GRAFT Right     thumb   • THYROIDECTOMY Social History:     Social History     Socioeconomic History   • Marital status: Unknown     Spouse name: None   • Number of children: None   • Years of education: None   • Highest education level: None   Occupational History   • Occupation: employed   Tobacco Use   • Smoking status: Former     Packs/day: 0.50     Years: 22.00     Total pack years: 11.00     Types: Cigarettes     Start date: 45     Quit date: 2015     Years since quittin.9   • Smokeless tobacco: Never   • Tobacco comments:     quit 5 years    Vaping Use   • Vaping Use: Never used   Substance and Sexual Activity   • Alcohol use: Not Currently     Comment: socially   • Drug use: Never   • Sexual activity: Yes     Partners: Female     Birth control/protection: None   Other Topics Concern   • None   Social History Narrative   • None     Social Determinants of Health     Financial Resource Strain: Not on file   Food Insecurity: Not on file   Transportation Needs: Not on file   Physical Activity: Not on file   Stress: Not on file   Social Connections: Not on file   Intimate Partner Violence: Not on file   Housing Stability: Not on file      Family History:     Family History   Problem Relation Age of Onset   • Rheum arthritis Mother    • COPD Mother    • Seizures Mother    • No Known Problems Father    • No Known Problems Sister    • Hypertension Maternal Grandmother    • Lung cancer Maternal Grandmother    • Heart disease Maternal Grandmother    • Heart disease Maternal Grandfather    • Emphysema Maternal Grandfather    • No Known Problems Paternal Grandmother    • Breast cancer Maternal Aunt 54   • No Known Problems Sister    • No Known Problems Sister    • No Known Problems Sister    • No Known Problems Maternal Aunt    • No Known Problems Paternal Aunt    • No Known Problems Paternal Aunt    • No Known Problems Paternal Aunt    • No Known Problems Paternal Aunt    • Colon cancer Neg Hx    • Ovarian cancer Neg Hx    • Cervical cancer Neg Hx    • Uterine cancer Neg Hx       Current Medications:     Current Outpatient Medications   Medication Sig Dispense Refill   • ARIPiprazole (ABILIFY) 5 mg tablet Take 5 mg by mouth daily     • ascorbic Acid (VITAMIN C) 500 MG CPCR Take 500 mg by mouth daily     • busPIRone (BUSPAR) 10 mg tablet Take 1 tablet (10 mg total) by mouth 3 (three) times a day (Patient taking differently: Take 10 mg by mouth 2 (two) times a day) 90 tablet 0   • Cholecalciferol (VITAMIN D-3) 5000 units TABS Take 5,000 Units by mouth     • fluticasone (FLONASE) 50 mcg/act nasal spray 1 spray into each nostril daily 9.9 mL 0   • folic acid (FOLVITE) 1 mg tablet      • methotrexate 2.5 mg tablet Take 5 tablets by mouth once a week     • nystatin (MYCOSTATIN) powder Apply topically 3 (three) times a day 30 g 1   • ondansetron (ZOFRAN-ODT) 4 mg disintegrating tablet DISSOLVE ONE TABLET BY MOUTH EVERY 4 TO 6 HOURS FOR 4 DAYS     • pantoprazole (PROTONIX) 40 mg tablet TAKE ONE TABLET BY MOUTH EVERY DAY 31 tablet 11   • prazosin (MINIPRESS) 1 mg capsule Take 2 mg by mouth daily at bedtime     • sertraline (ZOLOFT) 100 mg tablet Take 100 mg by mouth daily     • traZODone (DESYREL) 50 mg tablet Take 1 tablet by mouth daily as needed     • albuterol (PROVENTIL HFA,VENTOLIN HFA) 90 mcg/act inhaler Inhale 2 puffs every 6 (six) hours as needed for wheezing or shortness of breath (Patient not taking: Reported on 4/19/2023) 8 g 0   • levothyroxine 112 mcg tablet Take 224 mcg by mouth daily       No current facility-administered medications for this visit. Allergies:     No Known Allergies   Physical Exam:     /84 (BP Location: Left arm, Patient Position: Sitting, Cuff Size: Large)   Pulse 96   Temp (!) 96.5 °F (35.8 °C) (Tympanic)   Ht 5' 7" (1.702 m)   Wt 136 kg (298 lb 12.8 oz)   SpO2 97%   BMI 46.80 kg/m²     Physical Exam  Vitals and nursing note reviewed. Constitutional:       General: She is not in acute distress. Appearance: Normal appearance. She is well-developed. She is obese. She is not ill-appearing, toxic-appearing or diaphoretic. HENT:      Head: Normocephalic and atraumatic. Right Ear: External ear normal. There is impacted cerumen. Left Ear: External ear normal. There is impacted cerumen. Nose: Nose normal.      Mouth/Throat:      Mouth: Mucous membranes are moist.      Pharynx: Oropharynx is clear. No oropharyngeal exudate. Eyes:      Conjunctiva/sclera: Conjunctivae normal.      Pupils: Pupils are equal, round, and reactive to light. Neck:      Thyroid: No thyromegaly. Cardiovascular:      Rate and Rhythm: Normal rate and regular rhythm. Heart sounds: Normal heart sounds. No murmur heard. Pulmonary:      Effort: Pulmonary effort is normal. No respiratory distress. Breath sounds: Normal breath sounds. No stridor. No wheezing or rales. Chest:      Chest wall: No tenderness. Abdominal:      General: Bowel sounds are normal. There is no distension. Palpations: Abdomen is soft. There is no mass. Tenderness: There is no abdominal tenderness. There is no guarding or rebound. Hernia: No hernia is present. Musculoskeletal:         General: Normal range of motion. Cervical back: Normal range of motion and neck supple. Lymphadenopathy:      Cervical: No cervical adenopathy. Skin:     Capillary Refill: Capillary refill takes less than 2 seconds. Neurological:      General: No focal deficit present. Mental Status: She is alert and oriented to person, place, and time. Cranial Nerves: No cranial nerve deficit. Psychiatric:         Mood and Affect: Mood normal.         Behavior: Behavior normal.         Thought Content: Thought content normal.         Judgment: Judgment normal.        BMI Counseling: Body mass index is 46.8 kg/m².  The BMI is above normal. Nutrition recommendations include decreasing overall calorie intake, 3-5 servings of fruits/vegetables daily, reducing fast food intake, consuming healthier snacks, decreasing soda and/or juice intake, moderation in carbohydrate intake and increasing intake of lean protein. Exercise recommendations include exercising 3-5 times per week.     Chuy Bronson, [address] [facility]

## 2023-07-10 NOTE — ASSESSMENT & PLAN NOTE
Recent TSH 83.68  To continue current dose of levothyroxine for now. Stressed the importance of repeating thyroid function testing. Continue care with endocrinology.

## 2023-08-21 ENCOUNTER — TELEPHONE (OUTPATIENT)
Dept: RHEUMATOLOGY | Facility: CLINIC | Age: 43
End: 2023-08-21

## 2023-11-07 ENCOUNTER — OFFICE VISIT (OUTPATIENT)
Age: 43
End: 2023-11-07
Payer: COMMERCIAL

## 2023-11-07 ENCOUNTER — HOSPITAL ENCOUNTER (OUTPATIENT)
Age: 43
Discharge: HOME | End: 2023-11-07
Payer: COMMERCIAL

## 2023-11-07 VITALS
TEMPERATURE: 97.9 F | WEIGHT: 293 LBS | HEART RATE: 88 BPM | BODY MASS INDEX: 49.92 KG/M2 | OXYGEN SATURATION: 97 % | RESPIRATION RATE: 18 BRPM

## 2023-11-07 VITALS — HEIGHT: 65 IN | BODY MASS INDEX: 49.72 KG/M2

## 2023-11-07 DIAGNOSIS — R05.9 COUGH, UNSPECIFIED TYPE: ICD-10-CM

## 2023-11-07 DIAGNOSIS — Z12.31 ENCOUNTER FOR SCREENING MAMMOGRAM FOR MALIGNANT NEOPLASM OF BREAST: ICD-10-CM

## 2023-11-07 DIAGNOSIS — J01.00 ACUTE NON-RECURRENT MAXILLARY SINUSITIS: ICD-10-CM

## 2023-11-07 DIAGNOSIS — J02.9 SORE THROAT: ICD-10-CM

## 2023-11-07 DIAGNOSIS — J20.9 ACUTE BRONCHITIS, UNSPECIFIED ORGANISM: Primary | ICD-10-CM

## 2023-11-07 LAB
S PYO AG THROAT QL: NEGATIVE
SARS-COV-2 AG UPPER RESP QL IA: NEGATIVE
VALID CONTROL: NORMAL

## 2023-11-07 PROCEDURE — 87811 SARS-COV-2 COVID19 W/OPTIC: CPT | Performed by: EMERGENCY MEDICINE

## 2023-11-07 PROCEDURE — 77067 SCR MAMMO BI INCL CAD: CPT

## 2023-11-07 PROCEDURE — 77063 BREAST TOMOSYNTHESIS BI: CPT

## 2023-11-07 PROCEDURE — 99213 OFFICE O/P EST LOW 20 MIN: CPT | Performed by: EMERGENCY MEDICINE

## 2023-11-07 PROCEDURE — 87880 STREP A ASSAY W/OPTIC: CPT | Performed by: EMERGENCY MEDICINE

## 2023-11-07 PROCEDURE — 87070 CULTURE OTHR SPECIMN AEROBIC: CPT | Performed by: EMERGENCY MEDICINE

## 2023-11-07 RX ORDER — METHYLPREDNISOLONE 4 MG/1
TABLET ORAL
Qty: 21 TABLET | Refills: 0 | Status: SHIPPED | OUTPATIENT
Start: 2023-11-07

## 2023-11-07 RX ORDER — ALBUTEROL SULFATE 90 UG/1
2 AEROSOL, METERED RESPIRATORY (INHALATION) 4 TIMES DAILY
Qty: 8 G | Refills: 0 | Status: SHIPPED | OUTPATIENT
Start: 2023-11-07

## 2023-11-07 RX ORDER — AZITHROMYCIN 250 MG/1
TABLET, FILM COATED ORAL
Qty: 6 TABLET | Refills: 0 | Status: SHIPPED | OUTPATIENT
Start: 2023-11-07 | End: 2023-11-11

## 2023-11-07 NOTE — PROGRESS NOTES
[facility]        NAME: Charly Walter is a 36 y.o. female  : 1980    MRN: [id number]  DATE: 2023  TIME: 6:45 PM    Assessment and Plan   Acute bronchitis, unspecified organism [J20.9]  1. Acute bronchitis, unspecified organism  azithromycin (ZITHROMAX) 250 mg tablet    albuterol (PROVENTIL HFA,VENTOLIN HFA) 90 mcg/act inhaler    methylPREDNISolone (Medrol) 4 MG tablet therapy pack      2. Acute non-recurrent maxillary sinusitis  azithromycin (ZITHROMAX) 250 mg tablet    albuterol (PROVENTIL HFA,VENTOLIN HFA) 90 mcg/act inhaler    methylPREDNISolone (Medrol) 4 MG tablet therapy pack      3. Sore throat  POCT rapid strepA    Throat culture      4. Cough, unspecified type  Poct Covid 19 Rapid Antigen Test        Rapid STrep was NEG - throat culture sent    Rapid Covid was NEG    Patient Instructions     Patient Instructions   Meds as instructed  Tylenol 650 mg every 4 hours for fever/pain  Motrin every 6 hours for fever/pain  Check MyChart in 24 to 72 hours for your throat culture results  Follow-up with your PCP in 2 to 3 days  If symptoms get worse proceed to the ER  Encourage liquids and rest  Hot tea/honey  Gargle with warm salt water 3 x/day for sore throat       Follow up with PCP in 3-5 days. Proceed to  ER if symptoms worsen. Chief Complaint     Chief Complaint   Patient presents with    Cold Like Symptoms     Pt states for 4 days she has had a sore throat, congestion, productive cough, and headache. History of Present Illness       11-year-old white female with a chief complaint of sinus congestion, sore throat that feels like razor blades, and cough with green productive phlegm for the past 4 days. Patient states that her throat is a little better today. Review of Systems   Review of Systems   Constitutional:  Positive for chills. Negative for fever. HENT:  Positive for congestion, sinus pressure, sinus pain and sore throat. Negative for rhinorrhea. Eyes:  Negative for discharge and visual disturbance. Respiratory:  Positive for cough. Negative for shortness of breath and wheezing. Cardiovascular:  Negative for chest pain and palpitations. Gastrointestinal:  Negative for abdominal pain and vomiting. Endocrine: Negative for polydipsia and polyuria. Genitourinary:  Negative for dysuria and hematuria. Musculoskeletal:  Negative for arthralgias, gait problem and neck stiffness. Skin:  Negative for rash and wound. Neurological:  Negative for dizziness and headaches. Psychiatric/Behavioral:  Negative for confusion and suicidal ideas.           Current Medications       Current Outpatient Medications:     albuterol (PROVENTIL HFA,VENTOLIN HFA) 90 mcg/act inhaler, Inhale 2 puffs 4 (four) times a day, Disp: 8 g, Rfl: 0    ARIPiprazole (ABILIFY) 5 mg tablet, Take 5 mg by mouth daily, Disp: , Rfl:     ascorbic Acid (VITAMIN C) 500 MG CPCR, Take 500 mg by mouth daily, Disp: , Rfl:     azithromycin (ZITHROMAX) 250 mg tablet, Take 2 tablets today then 1 tablet daily x 4 days, Disp: 6 tablet, Rfl: 0    busPIRone (BUSPAR) 10 mg tablet, Take 1 tablet (10 mg total) by mouth 3 (three) times a day (Patient taking differently: Take 10 mg by mouth 2 (two) times a day), Disp: 90 tablet, Rfl: 0    Cholecalciferol (VITAMIN D-3) 5000 units TABS, Take 5,000 Units by mouth, Disp: , Rfl:     folic acid (FOLVITE) 1 mg tablet, , Disp: , Rfl:     levothyroxine 112 mcg tablet, Take 224 mcg by mouth daily, Disp: , Rfl:     methotrexate 2.5 mg tablet, Take 5 tablets by mouth once a week, Disp: , Rfl:     methylPREDNISolone (Medrol) 4 MG tablet therapy pack, Use as directed on package, Disp: 21 tablet, Rfl: 0    pantoprazole (PROTONIX) 40 mg tablet, TAKE ONE TABLET BY MOUTH EVERY DAY, Disp: 31 tablet, Rfl: 11    prazosin (MINIPRESS) 1 mg capsule, Take 2 mg by mouth daily at bedtime, Disp: , Rfl:     sertraline (ZOLOFT) 100 mg tablet, Take 100 mg by mouth daily, Disp: , Rfl: traZODone (DESYREL) 50 mg tablet, Take 1 tablet by mouth daily as needed, Disp: , Rfl:     albuterol (PROVENTIL HFA,VENTOLIN HFA) 90 mcg/act inhaler, Inhale 2 puffs every 6 (six) hours as needed for wheezing or shortness of breath (Patient not taking: Reported on 4/19/2023), Disp: 8 g, Rfl: 0    fluticasone (FLONASE) 50 mcg/act nasal spray, 1 spray into each nostril daily (Patient not taking: Reported on 11/7/2023), Disp: 9.9 mL, Rfl: 0    nystatin (MYCOSTATIN) powder, Apply topically 3 (three) times a day (Patient not taking: Reported on 11/7/2023), Disp: 30 g, Rfl: 1    ondansetron (ZOFRAN-ODT) 4 mg disintegrating tablet, DISSOLVE ONE TABLET BY MOUTH EVERY 4 TO 6 HOURS FOR 4 DAYS (Patient not taking: Reported on 11/7/2023), Disp: , Rfl:     Current Allergies     Allergies as of 11/07/2023    (No Known Allergies)            The following portions of the patient's history were reviewed and updated as appropriate: allergies, current medications, past family history, past medical history, past social history, past surgical history and problem list.     Past Medical History:   Diagnosis Date    Arthritis     RA    Cancer ([address])     Colon polyp     CPAP (continuous positive airway pressure) dependence     NEO (obstructive sleep apnea)     Sleep apnea     Thyroid cancer ([address])     Varicella        Past Surgical History:   Procedure Laterality Date    CHOLECYSTECTOMY      COLONOSCOPY      KNEE SURGERY Left     ACL repair     KNEE SURGERY      SKIN GRAFT Right     thumb    THYROIDECTOMY      US GUIDED THYROID BIOPSY  4/13/2018       Family History   Problem Relation Age of Onset    Rheum arthritis Mother     COPD Mother     Seizures Mother     No Known Problems Father     No Known Problems Sister     Hypertension Maternal Grandmother     Lung cancer Maternal Grandmother     Heart disease Maternal Grandmother     Heart disease Maternal Grandfather     Emphysema Maternal Grandfather     No Known Problems Paternal Grandmother     Breast cancer Maternal Aunt 47    No Known Problems Sister     No Known Problems Sister     No Known Problems Sister     No Known Problems Maternal Aunt     No Known Problems Paternal Aunt     No Known Problems Paternal Aunt     No Known Problems Paternal Aunt     No Known Problems Paternal Aunt     Colon cancer Neg Hx     Ovarian cancer Neg Hx     Cervical cancer Neg Hx     Uterine cancer Neg Hx          Medications have been verified. Objective   Pulse 88   Temp 97.9 °F (36.6 °C)   Resp 18   Wt 136 kg (300 lb)   LMP 10/21/2023 (Approximate)   SpO2 97%   BMI 49.92 kg/m²        Physical Exam     Physical Exam  Vitals reviewed. Constitutional:       General: She is not in acute distress. Appearance: She is well-developed. She is not ill-appearing, toxic-appearing or diaphoretic. Comments: 71-year-old white female sitting on exam table who sounds congested. There is erythema to bilateral cheeks. HENT:      Head: Normocephalic and atraumatic. Nose: Congestion present. Comments: Positive maxillary tenderness right greater than the left     Mouth/Throat:      Pharynx: Oropharynx is clear. Comments: Mild erythema of the posterior pharynx  Eyes:      General: No scleral icterus. Extraocular Movements: Extraocular movements intact. Pupils: Pupils are equal, round, and reactive to light. Cardiovascular:      Rate and Rhythm: Normal rate and regular rhythm. Heart sounds: Normal heart sounds. Pulmonary:      Effort: Pulmonary effort is normal. No respiratory distress. Breath sounds: Normal breath sounds. No stridor. No wheezing, rhonchi or rales. Chest:      Chest wall: No tenderness. Abdominal:      General: Abdomen is flat. Bowel sounds are normal. There is no distension. Palpations: Abdomen is soft. There is no shifting dullness, hepatomegaly, splenomegaly or mass. Tenderness: There is no abdominal tenderness.  There is no right CVA tenderness, left CVA tenderness or guarding. Negative signs include Whitehead's sign and McBurney's sign. Hernia: No hernia is present. Skin:     General: Skin is warm and dry. Coloration: Skin is not cyanotic, jaundiced, mottled or pale. Findings: No erythema. Neurological:      General: No focal deficit present. Mental Status: She is alert and oriented to person, place, and time.    Psychiatric:         Mood and Affect: Mood normal.

## 2023-11-09 LAB — BACTERIA THROAT CULT: NORMAL

## 2023-12-19 ENCOUNTER — OFFICE VISIT (OUTPATIENT)
Dept: GASTROENTEROLOGY | Facility: CLINIC | Age: 43
End: 2023-12-19
Payer: COMMERCIAL

## 2023-12-19 VITALS
BODY MASS INDEX: 48.82 KG/M2 | DIASTOLIC BLOOD PRESSURE: 83 MMHG | HEIGHT: 65 IN | SYSTOLIC BLOOD PRESSURE: 132 MMHG | OXYGEN SATURATION: 97 % | WEIGHT: 293 LBS | HEART RATE: 87 BPM

## 2023-12-19 DIAGNOSIS — R19.7 DIARRHEA, UNSPECIFIED TYPE: Primary | ICD-10-CM

## 2023-12-19 DIAGNOSIS — K21.9 GASTROESOPHAGEAL REFLUX DISEASE WITHOUT ESOPHAGITIS: ICD-10-CM

## 2023-12-19 PROCEDURE — 99214 OFFICE O/P EST MOD 30 MIN: CPT | Performed by: INTERNAL MEDICINE

## 2023-12-19 RX ORDER — HYDROCORTISONE ACETATE 25 MG/1
25 SUPPOSITORY RECTAL 2 TIMES DAILY
Qty: 12 SUPPOSITORY | Refills: 0 | Status: SHIPPED | OUTPATIENT
Start: 2023-12-19

## 2023-12-19 RX ORDER — HYDROCORTISONE 25 MG/G
CREAM TOPICAL 2 TIMES DAILY
Qty: 100 G | Refills: 3 | Status: SHIPPED | OUTPATIENT
Start: 2023-12-19

## 2023-12-19 NOTE — PROGRESS NOTES
[facility] - Outpatient Follow-up Note  Vanessa Torres 42 y.o. female MRN: [id number]  Encounter: [id number]          ASSESSMENT AND PLAN:      1. Diarrhea, unspecified type  - Stool Enteric Bacterial Panel by PCR; Future  - Clostridium difficile toxin by PCR with EIA; Future  - hydrocortisone (ANUSOL-HC) 25 mg suppository; Insert 1 suppository (25 mg total) into the rectum 2 (two) times a day  Dispense: 12 suppository; Refill: 0  - hydrocortisone (ANUSOL-HC) 2.5 % rectal cream; Apply topically 2 (two) times a day  Dispense: 100 g; Refill: 3    Comment: The recurrent nature of this diarrhea once a month the last 4-week is most consistent with IBS-D.  We discussed this fact in detail.  For this reason I am putting her on a fiber supplementation twice daily along with a probiotic, either align or Florastor once daily.  Based on the fact that she only had diarrhea for 1 week out of the month, I do not feel that she is a good candidate for Lotronex or viberzi    2. Gastroesophageal reflux disease without esophagitis  -Continue with pantoprazole as prescribed    3.  Rectal bleeding  -Most likely due to hemorrhoidal bleeding, fissure, or an anal tear.  Anusol HC suppositories once daily at bedtime and Anusol cream twice daily  -If the bleeding the diarrhea persist then colonoscopy should be repeated    ______________________________________________________________________    SUBJECTIVE: Becca comes to the office today with the ongoing complaint of rectal bleeding.  Bleeding has been ongoing for the past week.  She is also been experiencing diarrhea for the past week.  She states that she has 1 week long episode of diarrhea every months.  She states that she did take some Z-Shadi 1 month ago.  She denies any foreign travel.  She works in a school with fourth-graders.  She admitted to nausea and vomiting 1 week earlier that has since resolved.  She has abdominal pain which is in the bilateral  lower quadrants but states that she always has recurring episodes of of abdominal cramping pain.  She has tried Imodium in the past with some benefit.  The diarrhea can occur up to 4-5 times per day.  Her last colonoscopy was performed 2022 and it showed normal examination.      REVIEW OF SYSTEMS IS OTHERWISE NEGATIVE.      Historical Information   Past Medical History:   Diagnosis Date    Arthritis     RA    Cancer (HCC)     Colon polyp     CPAP (continuous positive airway pressure) dependence     NEO (obstructive sleep apnea)     Sleep apnea     Thyroid cancer (HCC)     Varicella      Past Surgical History:   Procedure Laterality Date    CHOLECYSTECTOMY      COLONOSCOPY      KNEE SURGERY Left     ACL repair     KNEE SURGERY      SKIN GRAFT Right     thumb    THYROIDECTOMY      US GUIDED THYROID BIOPSY  2018     Social History   Social History     Substance and Sexual Activity   Alcohol Use Not Currently    Comment: socially     Social History     Substance and Sexual Activity   Drug Use Never     Social History     Tobacco Use   Smoking Status Former    Current packs/day: 0.00    Average packs/day: 0.5 packs/day for 22.6 years (11.3 ttl pk-yrs)    Types: Cigarettes    Start date:     Quit date: 2015    Years since quittin.4   Smokeless Tobacco Never   Tobacco Comments    quit 5 years      Family History   Problem Relation Age of Onset    Rheum arthritis Mother     COPD Mother     Seizures Mother     No Known Problems Father     No Known Problems Sister     Hypertension Maternal Grandmother     Lung cancer Maternal Grandmother     Heart disease Maternal Grandmother     Heart disease Maternal Grandfather     Emphysema Maternal Grandfather     No Known Problems Paternal Grandmother     Breast cancer Maternal Aunt 54    No Known Problems Sister     No Known Problems Sister     No Known Problems Sister     No Known Problems Maternal Aunt     No Known Problems Paternal Aunt     No Known  "Problems Paternal Aunt     No Known Problems Paternal Aunt     No Known Problems Paternal Aunt     Colon cancer Neg Hx     Ovarian cancer Neg Hx     Cervical cancer Neg Hx     Uterine cancer Neg Hx        Meds/Allergies       Current Outpatient Medications:     albuterol (PROVENTIL HFA,VENTOLIN HFA) 90 mcg/act inhaler    ARIPiprazole (ABILIFY) 5 mg tablet    ascorbic Acid (VITAMIN C) 500 MG CPCR    Cholecalciferol (VITAMIN D-3) 5000 units TABS    folic acid (FOLVITE) 1 mg tablet    hydrocortisone (ANUSOL-HC) 2.5 % rectal cream    hydrocortisone (ANUSOL-HC) 25 mg suppository    levothyroxine 112 mcg tablet    methotrexate 2.5 mg tablet    pantoprazole (PROTONIX) 40 mg tablet    prazosin (MINIPRESS) 1 mg capsule    sertraline (ZOLOFT) 100 mg tablet    traZODone (DESYREL) 50 mg tablet    busPIRone (BUSPAR) 10 mg tablet    fluticasone (FLONASE) 50 mcg/act nasal spray    nystatin (MYCOSTATIN) powder    ondansetron (ZOFRAN-ODT) 4 mg disintegrating tablet    No Known Allergies        Objective     Blood pressure 132/83, pulse 87, height 5' 5\" (1.651 m), weight 135 kg (297 lb 6.4 oz), SpO2 97%. Body mass index is 49.49 kg/m².      PHYSICAL EXAM:      General Appearance:   Alert, cooperative, no distress   HEENT:   Normocephalic, atraumatic, anicteric.     Neck:  Supple, symmetrical, trachea midline   Lungs:   Clear to auscultation bilaterally; no rales, rhonchi or wheezing; respirations unlabored    Heart::   Regular rate and rhythm; no murmur, rub, or gallop.   Abdomen:   Soft, non-tender, non-distended; normal bowel sounds; no masses, no organomegaly    Genitalia:   Deferred    Rectal:   Deferred    Extremities:  No cyanosis, clubbing or edema    Pulses:  2+ and symmetric    Skin:  No jaundice, rashes, or lesions    Lymph nodes:  No palpable cervical lymphadenopathy        Lab Results:   No visits with results within 1 Day(s) from this visit.   Latest known visit with results is:   Office Visit on 11/07/2023   Component " Date Value     RAPID STREP A 11/07/2023 Negative     POCT SARS-CoV-2 Ag 11/07/2023 Negative     VALID CONTROL 11/07/2023 Valid     Throat Culture 11/07/2023 Negative for beta-hemolytic Streptococcus          Radiology Results:   No results found.  Answers submitted by the patient for this visit:  Abdominal Pain Questionnaire (Submitted on 12/18/2023)  Chief Complaint: Abdominal pain  Chronicity: new  Onset: in the past 7 days  Onset quality: gradual  Frequency: every several days  Episode duration: 2 Hours  Progression since onset: gradually worsening  Pain location: LLQ, epigastric region  Pain - numeric: 4/10  Pain quality: aching, sharp  Radiates to: left flank  anorexia: No  arthralgias: No  belching: Yes  constipation: No  diarrhea: Yes  dysuria: No  fever: No  flatus: Yes  frequency: Yes  headaches: Yes  hematochezia: Yes  hematuria: No  melena: No  myalgias: No  nausea: Yes  weight loss: No  vomiting: Yes  Aggravated by: bowel movement, eating  Relieved by: bowel movements, passing flatus, vomiting

## 2023-12-20 ENCOUNTER — TELEPHONE (OUTPATIENT)
Age: 43
End: 2023-12-20

## 2023-12-20 NOTE — TELEPHONE ENCOUNTER
Called pharmacy to inform them that this script is handled by her Rheumatologist we don't fill this for patient. I tried to reach patient but n/a.I set a staff message to the specialist to see if they can send in  a new script for her as well.

## 2023-12-20 NOTE — TELEPHONE ENCOUNTER
[facility]  [place], [place]     Medication: METHOTREZATE     Dose/Frequency: 2.5     PER PHARMACY SHE IS TO TAKE 8 ONCE A WEEK     ON SCRIPT IS STATES 5 ONCE A WEEK     Please Advise  [phone]     Pharmacy: [facility]  [address]  [place], [place]

## 2024-01-05 ENCOUNTER — OFFICE VISIT (OUTPATIENT)
Age: 44
End: 2024-01-05
Payer: COMMERCIAL

## 2024-01-05 VITALS
HEART RATE: 90 BPM | RESPIRATION RATE: 18 BRPM | WEIGHT: 293 LBS | TEMPERATURE: 97 F | DIASTOLIC BLOOD PRESSURE: 76 MMHG | BODY MASS INDEX: 49.32 KG/M2 | SYSTOLIC BLOOD PRESSURE: 132 MMHG | OXYGEN SATURATION: 97 %

## 2024-01-05 DIAGNOSIS — R05.1 ACUTE COUGH: ICD-10-CM

## 2024-01-05 DIAGNOSIS — J06.9 UPPER RESPIRATORY TRACT INFECTION, UNSPECIFIED TYPE: Primary | ICD-10-CM

## 2024-01-05 DIAGNOSIS — J02.9 SORE THROAT: ICD-10-CM

## 2024-01-05 PROBLEM — F41.0 PANIC DISORDER WITHOUT AGORAPHOBIA WITH SEVERE PANIC ATTACKS: Status: ACTIVE | Noted: 2021-06-04

## 2024-01-05 PROBLEM — E11.9 DIET-CONTROLLED DIABETES MELLITUS (HCC): Status: ACTIVE | Noted: 2021-09-26

## 2024-01-05 LAB
S PYO AG THROAT QL: NEGATIVE
SARS-COV-2 AG UPPER RESP QL IA: NEGATIVE
VALID CONTROL: NORMAL

## 2024-01-05 PROCEDURE — 99213 OFFICE O/P EST LOW 20 MIN: CPT

## 2024-01-05 PROCEDURE — 87636 SARSCOV2 & INF A&B AMP PRB: CPT

## 2024-01-05 RX ORDER — OSELTAMIVIR PHOSPHATE 75 MG/1
75 CAPSULE ORAL EVERY 12 HOURS SCHEDULED
Qty: 10 CAPSULE | Refills: 0 | Status: SHIPPED | OUTPATIENT
Start: 2024-01-05 | End: 2024-01-10

## 2024-01-05 NOTE — PATIENT INSTRUCTIONS
Start Tamiflu today. Check MyChart in 1-2 days, if negative for flu, stop taking medication. Use inhaler as previously prescribed as needed. Symptoms of a viral infection may linger for up to 10 days. Your contagious period is the first 5-7 days of symptom onset. Continue over-the-counter products for symptoms: tylenol for fevers, ibuprofen for body aches, flonase (fluticasone) with nasal saline for nasal congestion, mucinex for cough, and airborne/emergen-c for vitamin supplementation. Follow-up with PCP in 3-5 days if no improvement of symptoms. Report to ER sooner if symptoms worsen.

## 2024-01-05 NOTE — PROGRESS NOTES
[facility]        NAME: Vanessa Torres is a 43 y.o. female  : 1980    MRN: [id number]  DATE: 2024  TIME: 10:58 AM    Assessment and Plan   Upper respiratory tract infection, unspecified type [J06.9]  1. Upper respiratory tract infection, unspecified type  oseltamivir (TAMIFLU) 75 mg capsule      2. Acute cough  Poct Covid 19 Rapid Antigen Test    Covid/Flu- Office Collect Normal      3. Sore throat  POCT rapid strepA        Rapid Strep and COVID tests negative, will send COVID/flu PCR and follow-up if positive. Patient electing to start Tamiflu en debbie of results. Suspect viral illness given clinical presentation. VSS in clinic, appears in no acute distress. Educated on use of OTC products for additional relief of symptoms. Advised close follow-up with PCP or to report to the ER if symptoms worsen. Patient verbalizes understanding and agreeable to plan.      Patient Instructions     Start Tamiflu today. Check MyChart in 1-2 days, if negative for flu, stop taking medication. Use inhaler as previously prescribed as needed. Symptoms of a viral infection may linger for up to 10 days. Your contagious period is the first 5-7 days of symptom onset. Continue over-the-counter products for symptoms: tylenol for fevers, ibuprofen for body aches, flonase (fluticasone) with nasal saline for nasal congestion, mucinex for cough, and airborne/emergen-c for vitamin supplementation. Follow-up with PCP in 3-5 days if no improvement of symptoms. Report to ER sooner if symptoms worsen.       Chief Complaint     Chief Complaint   Patient presents with    Cold Like Symptoms     Symptom started about 2 days ago. C/o cough, sore  throat, loss of smell and taste, and fatigue.  Otc taken.          History of Present Illness       43 year old female presents for evaluation of sore throat, congestion, and loss of taste/smell for the past 2 days. She reports she was around friends/family members last week who  were sick. She denies history of asthma or allergies but does have an inhaler at home for the last time she was sick. She is not UTD on COVID or flu vaccines for this season. She reports intermittent shortness of breath with coughing but denies fevers or productive sputum. She has taken dayquil and nyquil for symptoms with minimal improvement.    URI   This is a new problem. The current episode started in the past 7 days. The problem has been unchanged. There has been no fever. Associated symptoms include congestion, coughing, headaches, joint pain, rhinorrhea, a sore throat and swollen glands. Pertinent negatives include no abdominal pain, chest pain, diarrhea, dysuria, ear pain, joint swelling, nausea, neck pain, plugged ear sensation, rash, sinus pain, sneezing, vomiting or wheezing. She has tried decongestant for the symptoms. The treatment provided no relief.       Review of Systems   Review of Systems   Constitutional:  Positive for activity change and fatigue. Negative for appetite change, chills and fever.   HENT:  Positive for congestion, postnasal drip, rhinorrhea and sore throat. Negative for ear pain, sinus pressure, sinus pain, sneezing and trouble swallowing.    Respiratory:  Positive for cough and shortness of breath (intermittent with coughing, not currently present). Negative for chest tightness and wheezing.    Cardiovascular:  Negative for chest pain.   Gastrointestinal:  Negative for abdominal pain, constipation, diarrhea, nausea and vomiting.   Genitourinary:  Negative for dysuria.   Musculoskeletal:  Positive for joint pain. Negative for arthralgias, back pain, myalgias and neck pain.   Skin:  Negative for rash.   Allergic/Immunologic: Negative for environmental allergies and food allergies.   Neurological:  Positive for headaches. Negative for dizziness and light-headedness.         Current Medications       Current Outpatient Medications:     oseltamivir (TAMIFLU) 75 mg capsule, Take 1  capsule (75 mg total) by mouth every 12 (twelve) hours for 5 days, Disp: 10 capsule, Rfl: 0    albuterol (PROVENTIL HFA,VENTOLIN HFA) 90 mcg/act inhaler, Inhale 2 puffs 4 (four) times a day, Disp: 8 g, Rfl: 0    ARIPiprazole (ABILIFY) 5 mg tablet, Take 5 mg by mouth daily, Disp: , Rfl:     ascorbic Acid (VITAMIN C) 500 MG CPCR, Take 500 mg by mouth daily, Disp: , Rfl:     busPIRone (BUSPAR) 10 mg tablet, Take 1 tablet (10 mg total) by mouth 3 (three) times a day (Patient taking differently: Take 10 mg by mouth 2 (two) times a day), Disp: 90 tablet, Rfl: 0    Cholecalciferol (VITAMIN D-3) 5000 units TABS, Take 5,000 Units by mouth, Disp: , Rfl:     fluticasone (FLONASE) 50 mcg/act nasal spray, 1 spray into each nostril daily, Disp: 9.9 mL, Rfl: 0    folic acid (FOLVITE) 1 mg tablet, , Disp: , Rfl:     hydrocortisone (ANUSOL-HC) 2.5 % rectal cream, Apply topically 2 (two) times a day, Disp: 100 g, Rfl: 3    hydrocortisone (ANUSOL-HC) 25 mg suppository, Insert 1 suppository (25 mg total) into the rectum 2 (two) times a day, Disp: 12 suppository, Rfl: 0    levothyroxine 112 mcg tablet, Take 224 mcg by mouth daily, Disp: , Rfl:     methotrexate 2.5 mg tablet, Take 5 tablets by mouth once a week, Disp: , Rfl:     nystatin (MYCOSTATIN) powder, Apply topically 3 (three) times a day, Disp: 30 g, Rfl: 1    ondansetron (ZOFRAN-ODT) 4 mg disintegrating tablet, , Disp: , Rfl:     pantoprazole (PROTONIX) 40 mg tablet, TAKE ONE TABLET BY MOUTH EVERY DAY, Disp: 31 tablet, Rfl: 11    prazosin (MINIPRESS) 1 mg capsule, Take 2 mg by mouth daily at bedtime, Disp: , Rfl:     sertraline (ZOLOFT) 100 mg tablet, Take 100 mg by mouth daily, Disp: , Rfl:     traZODone (DESYREL) 50 mg tablet, Take 1 tablet by mouth daily as needed, Disp: , Rfl:     Current Allergies     Allergies as of 01/05/2024    (No Known Allergies)            The following portions of the patient's history were reviewed and updated as appropriate: allergies, current  medications, past family history, past medical history, past social history, past surgical history and problem list.     Past Medical History:   Diagnosis Date    Arthritis     RA    Cancer (HCC)     Colon polyp     CPAP (continuous positive airway pressure) dependence     NEO (obstructive sleep apnea)     Sleep apnea     Thyroid cancer (HCC)     Varicella        Past Surgical History:   Procedure Laterality Date    CHOLECYSTECTOMY      COLONOSCOPY      KNEE SURGERY Left     ACL repair     KNEE SURGERY      SKIN GRAFT Right     thumb    THYROIDECTOMY      US GUIDED THYROID BIOPSY  4/13/2018       Family History   Problem Relation Age of Onset    Rheum arthritis Mother     COPD Mother     Seizures Mother     No Known Problems Father     No Known Problems Sister     Hypertension Maternal Grandmother     Lung cancer Maternal Grandmother     Heart disease Maternal Grandmother     Heart disease Maternal Grandfather     Emphysema Maternal Grandfather     No Known Problems Paternal Grandmother     Breast cancer Maternal Aunt 54    No Known Problems Sister     No Known Problems Sister     No Known Problems Sister     No Known Problems Maternal Aunt     No Known Problems Paternal Aunt     No Known Problems Paternal Aunt     No Known Problems Paternal Aunt     No Known Problems Paternal Aunt     Colon cancer Neg Hx     Ovarian cancer Neg Hx     Cervical cancer Neg Hx     Uterine cancer Neg Hx          Medications have been verified.        Objective   /76   Pulse 90   Temp (!) 97 °F (36.1 °C)   Resp 18   Wt 134 kg (296 lb 6.4 oz)   SpO2 97%   BMI 49.32 kg/m²        Physical Exam     Physical Exam  Vitals and nursing note reviewed.   Constitutional:       General: She is awake. She is not in acute distress.     Appearance: Normal appearance. She is overweight.   HENT:      Head: Normocephalic and atraumatic.      Right Ear: Hearing, tympanic membrane, ear canal and external ear normal.      Left Ear: Hearing,  tympanic membrane, ear canal and external ear normal.      Nose: Congestion and rhinorrhea present. Rhinorrhea is clear.      Right Turbinates: Enlarged. Not swollen or pale.      Left Turbinates: Enlarged. Not swollen or pale.      Right Sinus: No maxillary sinus tenderness or frontal sinus tenderness.      Left Sinus: No maxillary sinus tenderness or frontal sinus tenderness.      Mouth/Throat:      Lips: Pink.      Mouth: Mucous membranes are moist.      Pharynx: Oropharynx is clear. Uvula midline. Posterior oropharyngeal erythema present. No pharyngeal swelling, oropharyngeal exudate or uvula swelling.      Tonsils: No tonsillar exudate or tonsillar abscesses. 2+ on the right. 2+ on the left.   Eyes:      Conjunctiva/sclera: Conjunctivae normal.   Cardiovascular:      Rate and Rhythm: Normal rate and regular rhythm.      Pulses: Normal pulses.      Heart sounds: Normal heart sounds.   Pulmonary:      Effort: Pulmonary effort is normal.      Breath sounds: Normal breath sounds.   Musculoskeletal:      Cervical back: Full passive range of motion without pain, normal range of motion and neck supple.   Lymphadenopathy:      Cervical: Cervical adenopathy present.   Skin:     General: Skin is warm and dry.   Neurological:      General: No focal deficit present.      Mental Status: She is alert and oriented to person, place, and time.   Psychiatric:         Mood and Affect: Mood normal.         Behavior: Behavior normal. Behavior is cooperative.         Thought Content: Thought content normal.         Judgment: Judgment normal.

## 2024-01-05 NOTE — LETTER
[facility] [place]  [address] [address]  [phone]  Dept: [phone]    January 5, 2024    Patient: Vanessa Torres  YOB: 1980    Vanessa Torres was seen and evaluated at our [facility]. Please note if Covid and Flu tests are negative, they may return to work when fever free for 24 hours without the use of a fever reducing agent. If Covid or Flu test is positive, they may return to work on 01/08/2024, as this is 5 days from the onset of symptoms. Upon return, they must then adhere to strict masking for an additional 5 days.    Sincerely,    CRIS Foote

## 2024-02-12 ENCOUNTER — OFFICE VISIT (OUTPATIENT)
Dept: FAMILY MEDICINE CLINIC | Facility: CLINIC | Age: 44
End: 2024-02-12
Payer: COMMERCIAL

## 2024-02-12 VITALS
DIASTOLIC BLOOD PRESSURE: 78 MMHG | HEIGHT: 65 IN | HEART RATE: 114 BPM | OXYGEN SATURATION: 98 % | TEMPERATURE: 98 F | BODY MASS INDEX: 48.82 KG/M2 | WEIGHT: 293 LBS | SYSTOLIC BLOOD PRESSURE: 136 MMHG

## 2024-02-12 DIAGNOSIS — F41.8 DEPRESSION WITH ANXIETY: ICD-10-CM

## 2024-02-12 DIAGNOSIS — Z11.59 NEED FOR HEPATITIS C SCREENING TEST: ICD-10-CM

## 2024-02-12 DIAGNOSIS — C73 THYROID CANCER (HCC): ICD-10-CM

## 2024-02-12 DIAGNOSIS — E11.65 TYPE 2 DIABETES MELLITUS WITH HYPERGLYCEMIA, WITHOUT LONG-TERM CURRENT USE OF INSULIN (HCC): ICD-10-CM

## 2024-02-12 DIAGNOSIS — K58.0 IRRITABLE BOWEL SYNDROME WITH DIARRHEA: ICD-10-CM

## 2024-02-12 DIAGNOSIS — E66.01 MORBID OBESITY WITH BMI OF 45.0-49.9, ADULT (HCC): ICD-10-CM

## 2024-02-12 DIAGNOSIS — E11.9 DIET-CONTROLLED DIABETES MELLITUS (HCC): Primary | ICD-10-CM

## 2024-02-12 DIAGNOSIS — M06.9 RHEUMATOID ARTHRITIS INVOLVING MULTIPLE SITES, UNSPECIFIED WHETHER RHEUMATOID FACTOR PRESENT (HCC): ICD-10-CM

## 2024-02-12 DIAGNOSIS — G47.33 OSA (OBSTRUCTIVE SLEEP APNEA): ICD-10-CM

## 2024-02-12 DIAGNOSIS — R04.0 EPISTAXIS: ICD-10-CM

## 2024-02-12 DIAGNOSIS — F43.10 POST TRAUMATIC STRESS DISORDER (PTSD): ICD-10-CM

## 2024-02-12 DIAGNOSIS — Z11.4 SCREENING FOR HIV (HUMAN IMMUNODEFICIENCY VIRUS): ICD-10-CM

## 2024-02-12 PROBLEM — Z00.00 ANNUAL PHYSICAL EXAM: Status: RESOLVED | Noted: 2020-09-21 | Resolved: 2024-02-12

## 2024-02-12 LAB
LEFT EYE DIABETIC RETINOPATHY: NORMAL
LEFT EYE IMAGE QUALITY: NORMAL
LEFT EYE MACULAR EDEMA: NORMAL
LEFT EYE OTHER RETINOPATHY: NORMAL
RIGHT EYE DIABETIC RETINOPATHY: NORMAL
RIGHT EYE IMAGE QUALITY: NORMAL
RIGHT EYE MACULAR EDEMA: NORMAL
RIGHT EYE OTHER RETINOPATHY: NORMAL
SEVERITY (EYE EXAM): NORMAL
SL AMB POCT HEMOGLOBIN AIC: 8.5 (ref ?–6.5)

## 2024-02-12 PROCEDURE — 83036 HEMOGLOBIN GLYCOSYLATED A1C: CPT | Performed by: NURSE PRACTITIONER

## 2024-02-12 PROCEDURE — 99214 OFFICE O/P EST MOD 30 MIN: CPT | Performed by: NURSE PRACTITIONER

## 2024-02-12 RX ORDER — AZITHROMYCIN 250 MG/1
TABLET, FILM COATED ORAL
COMMUNITY
Start: 2023-11-07 | End: 2024-02-12 | Stop reason: ALTCHOICE

## 2024-02-12 RX ORDER — OSELTAMIVIR PHOSPHATE 75 MG/1
CAPSULE ORAL
COMMUNITY
Start: 2024-01-05 | End: 2024-02-12 | Stop reason: ALTCHOICE

## 2024-02-12 NOTE — PROGRESS NOTES
Assessment/Plan:    Irritable bowel syndrome with diarrhea  To continue probiotic.  Discussed beginning low fod map diet.    Type 2 diabetes mellitus with hyperglycemia, without long-term current use of insulin ([facility])    Lab Results   Component Value Date    HGBA1C 8.5 (A) 02/12/2024   A1c elevated.  Patient would like to avoid metformin in hopes to avoid further diarrhea.  Will avoid Ozempic due to history of thyroid cancer.  To begin Jardiance 10 mg daily.  Foot exam completed today, normal.  Eye exam also completed while in office today.  Stressed the importance of consuming a low-carb diet, weight loss, and engaging in daily physical activity.  Provided referral to diabetic educator.  To keep upcoming appointment with endocrinology next month.  Follow-up in 4 months or sooner if needed.    NEO (obstructive sleep apnea)  To continue use of CPAP nightly.    Rheumatoid arthritis involving multiple sites ([facility])  To continue care with rheumatology.  To continue methotrexate and folic acid.    Morbid obesity with BMI of 45.0-49.9, adult ([facility])  Counseled on the importance of weight loss, healthy food choices, engaging in daily physical activity, and reducing BMI.     Depression with anxiety  To continue care with psychiatrist and counselor.    Epistaxis  To begin checking blood pressure while at home. To call if consistently higher than 140/90.   Advised to use a cool-mist humidifier and nasal saline spray/gel to moisturize nares.  To stop Flonase.  If nosebleeds continue, will refer to ENT.       Diagnoses and all orders for this visit:    Diet-controlled diabetes mellitus (HCC)  -     IRIS Diabetic eye exam  -     Albumin / creatinine urine ratio; Future  -     Lipid Panel with Direct LDL reflex; Future  -     TSH, 3rd generation with Free T4 reflex; Future  -     Ambulatory referral to Diabetic Education - use to refer for diabetes group classes, individual diabetes education, medical nutrition therapy, device  training; Future  -     Empagliflozin (Jardiance) 10 MG TABS tablet; Take 1 tablet (10 mg total) by mouth in the morning  -     POCT hemoglobin A1c    NEO (obstructive sleep apnea)    Need for hepatitis C screening test  -     Hepatitis C Antibody; Future    Screening for HIV (human immunodeficiency virus)  -     HIV 1/2 AG/AB w Reflex [facility] for 2 yr old and above; Future    Rheumatoid arthritis involving multiple sites, unspecified whether rheumatoid factor present (HCC)    Thyroid cancer (HCC)    Irritable bowel syndrome with diarrhea    Type 2 diabetes mellitus with hyperglycemia, without long-term current use of insulin (HCC)    Morbid obesity with BMI of 45.0-49.9, adult (HCC)    Post traumatic stress disorder (PTSD)    Depression with anxiety    Epistaxis    Other orders  -     Discontinue: azithromycin (ZITHROMAX) 250 mg tablet; TAKE 2 TABLETS ON FIRST DAY , THEN 1 TABLET DAILY FOR 4 DAYS  -     Discontinue: oseltamivir (TAMIFLU) 75 mg capsule; TAKE ONE CAPSULE BY MOUTH EVERY 12 HOURS FOR 5 DAYS          Subjective:      Patient ID: Vanessa Torres is a 43 y.o. female.    Becca presents for follow-up.  Last Thursday, after an episode of diarrhea, she developed a bloody nose.  It took about 45 minutes to stop the bleeding.  She has a history of intermittent nosebleeds.  She has never been to ENT or had any nasal trauma.  During this time her blood pressure was elevated at 163/90.  She was recently seen by GI and diagnosed with IBS with diarrhea.  When having a bowel movement, she will have abdominal cramping and experience some nausea.  Denies blood, weight loss, constipation, or change in appetite.  She recently started a probiotic.    Abdominal Pain  Associated symptoms include nausea. Pertinent negatives include no headaches.   Nausea  Associated symptoms include abdominal pain, coughing and nausea. Pertinent negatives include no headaches.   Hypertension  Pertinent negatives include no headaches.          The following portions of the patient's history were reviewed and updated as appropriate: She   Patient Active Problem List    Diagnosis Date Noted    Epistaxis 02/12/2024    Type 2 diabetes mellitus with hyperglycemia, without long-term current use of insulin ([facility]) 09/26/2021    Abnormal stress ECG with treadmill 09/07/2021    Panic disorder without agoraphobia with severe panic attacks 06/04/2021    Irritable bowel syndrome with diarrhea 04/22/2021    NEO (obstructive sleep apnea)     Menorrhagia with regular cycle 04/08/2019    Post traumatic stress disorder (PTSD) 01/25/2019    Depression with anxiety 12/10/2018    Gastroesophageal reflux disease without esophagitis 03/27/2018    Morbid obesity with BMI of 45.0-49.9, adult ([facility]) 03/27/2018    Post-surgical hypothyroidism 03/27/2018    Rheumatoid arthritis involving multiple sites ([facility]) 03/27/2018    Thyroid cancer ([facility]) 03/27/2018     Current Outpatient Medications   Medication Sig Dispense Refill    albuterol (PROVENTIL HFA,VENTOLIN HFA) 90 mcg/act inhaler Inhale 2 puffs 4 (four) times a day 8 g 0    ARIPiprazole (ABILIFY) 5 mg tablet Take 5 mg by mouth daily      ascorbic Acid (VITAMIN C) 500 MG CPCR Take 500 mg by mouth daily      busPIRone (BUSPAR) 10 mg tablet Take 1 tablet (10 mg total) by mouth 3 (three) times a day (Patient taking differently: Take 10 mg by mouth 2 (two) times a day) 90 tablet 0    Cholecalciferol (VITAMIN D-3) 5000 units TABS Take 5,000 Units by mouth      Empagliflozin (Jardiance) 10 MG TABS tablet Take 1 tablet (10 mg total) by mouth in the morning 90 tablet 1    fluticasone (FLONASE) 50 mcg/act nasal spray 1 spray into each nostril daily 9.9 mL 0    folic acid (FOLVITE) 1 mg tablet       levothyroxine 112 mcg tablet Take 224 mcg by mouth daily      methotrexate 2.5 mg tablet Take 5 tablets by mouth once a week      nystatin (MYCOSTATIN) powder Apply topically 3 (three) times a day 30 g 1    ondansetron (ZOFRAN-ODT) 4 mg  "disintegrating tablet       pantoprazole (PROTONIX) 40 mg tablet TAKE ONE TABLET BY MOUTH EVERY DAY 31 tablet 11    prazosin (MINIPRESS) 1 mg capsule Take 2 mg by mouth daily at bedtime      sertraline (ZOLOFT) 100 mg tablet Take 100 mg by mouth daily      traZODone (DESYREL) 50 mg tablet Take 1 tablet by mouth daily as needed       No current facility-administered medications for this visit.     She has No Known Allergies..    Review of Systems   Constitutional: Negative.    HENT:  Positive for nosebleeds.    Respiratory:  Positive for cough.    Cardiovascular: Negative.    Gastrointestinal:  Positive for abdominal pain and nausea.   Genitourinary: Negative.    Musculoskeletal: Negative.    Skin: Negative.    Neurological: Negative.  Negative for dizziness and headaches.   Psychiatric/Behavioral: Negative.           /78 (BP Location: Right arm, Patient Position: Sitting)   Pulse (!) 114   Temp 98 °F (36.7 °C)   Ht 5' 5\" (1.651 m)   Wt 136 kg (298 lb 12.8 oz)   SpO2 98%   BMI 49.72 kg/m²     Objective:     Physical Exam  Vitals and nursing note reviewed.   Constitutional:       General: She is not in acute distress.     Appearance: Normal appearance. She is well-developed. She is obese. She is not ill-appearing, toxic-appearing or diaphoretic.   HENT:      Head: Normocephalic and atraumatic.      Right Ear: Ear canal and external ear normal. There is impacted cerumen.      Left Ear: Ear canal and external ear normal. There is impacted cerumen.      Nose: Nose normal.      Mouth/Throat:      Mouth: Mucous membranes are moist.      Pharynx: Oropharynx is clear.   Eyes:      Conjunctiva/sclera: Conjunctivae normal.   Cardiovascular:      Rate and Rhythm: Normal rate and regular rhythm.      Pulses: no weak pulses          Dorsalis pedis pulses are 2+ on the right side and 2+ on the left side.      Heart sounds: Normal heart sounds. No murmur heard.  Pulmonary:      Effort: Pulmonary effort is normal. No " respiratory distress.      Breath sounds: Normal breath sounds. No wheezing or rales.   Chest:      Chest wall: No tenderness.   Musculoskeletal:      Cervical back: Neck supple.   Feet:      Right foot:      Skin integrity: Callus and dry skin present. No ulcer, skin breakdown, erythema or warmth.      Left foot:      Skin integrity: Callus and dry skin present. No ulcer, skin breakdown, erythema or warmth.   Lymphadenopathy:      Cervical: No cervical adenopathy.   Skin:     General: Skin is warm and dry.      Capillary Refill: Capillary refill takes less than 2 seconds.   Neurological:      General: No focal deficit present.      Mental Status: She is alert and oriented to person, place, and time.   Psychiatric:         Mood and Affect: Mood normal.         Behavior: Behavior normal.         Thought Content: Thought content normal.         Judgment: Judgment normal.           Patient's shoes and socks removed.    Right Foot/Ankle   Right Foot Inspection  Skin Exam: skin normal, skin intact, dry skin, callus and callus. No warmth, no erythema, no maceration, no abnormal color, no pre-ulcer and no ulcer.     Toe Exam: ROM and strength within normal limits.     Sensory   Monofilament testing: intact    Vascular  Capillary refills: < 3 seconds  The right DP pulse is 2+.     Left Foot/Ankle  Left Foot Inspection  Skin Exam: skin normal, skin intact, dry skin and callus. No warmth, no erythema, no maceration, normal color, no pre-ulcer and no ulcer.     Toe Exam: ROM and strength within normal limits.     Sensory   Monofilament testing: intact    Vascular  Capillary refills: < 3 seconds  The left DP pulse is 2+.     Assign Risk Category  No deformity present  No loss of protective sensation  No weak pulses  Risk: 0   Depression Screening Follow-up Plan: Patient's depression screening was positive with a PHQ-2 score of . Their PHQ-9 score was 5. Patient assessed for underlying major depression. They have no active  suicidal ideations. Brief counseling provided and recommend additional follow-up/re-evaluation next office visit. Continue regular follow-up with their psychologist/therapist/psychiatrist who is managing their mental health condition(s).

## 2024-02-12 NOTE — PATIENT INSTRUCTIONS
Omron b/p machine to begin checking b/p daily       Type 2 Diabetes Management for Adults   AMBULATORY CARE:   Type 2 diabetes  is a disease that affects how your body uses glucose (sugar). Either your body cannot make enough insulin, or it cannot use the insulin correctly. It is important to keep diabetes controlled to prevent damage to your heart, blood vessels, and other organs. Management will help you feel well and enjoy your daily activities. Your diabetes care team providers can help you make a plan to fit diabetes care into your schedule. Your plan can change over time to fit your needs and your family's needs.       Have someone call your local emergency number (911 in the [place]) if:   You cannot be woken.    You have signs of diabetic ketoacidosis:     confusion, fatigue    vomiting    rapid heartbeat    fruity smelling breath    extreme thirst    dry mouth and skin    You have any of the following signs of a heart attack:      Squeezing, pressure, or pain in your chest    You may  also have any of the following:     Discomfort or pain in your back, neck, jaw, stomach, or arm    Shortness of breath    Nausea or vomiting    Lightheadedness or a sudden cold sweat    You have any of the following signs of a stroke:      Numbness or drooping on one side of your face     Weakness in an arm or leg    Confusion or difficulty speaking    Dizziness, a severe headache, or vision loss    Call your doctor or diabetes care team provider if:   You have a sore or wound that will not heal.    You have a change in the amount you urinate.    Your blood sugar levels are higher than your target goals.    You often have lower blood sugar levels than your target goals.    Your skin is red, dry, warm, or swollen.    You have trouble coping with diabetes, or you feel anxious or depressed.    You have trouble following any part of your care plan, such as your meal plan.    You have questions or concerns about your condition or  care.    What you need to know about high blood sugar levels:  High blood sugar levels may not cause any symptoms. You may feel more thirsty or urinate more often than usual. Over time, high blood sugar levels can damage your nerves, blood vessels, tissues, and organs. The following can increase your blood sugar levels:  Large meals or large amounts of carbohydrates at one time    Less physical activity    Stress    Illness    A lower dose of diabetes medicine or insulin, or a late dose    What you need to know about low blood sugar levels:  Symptoms include feeling shaky, dizzy, irritable, or confused. You can prevent symptoms by keeping your blood sugar levels from going too low.  Treat a low blood sugar level right away:      Drink 4 ounces of juice or have 1 tube of glucose gel.    Check your blood sugar level again 10 to 15 minutes later.    When the level goes back to normal, eat a meal or snack to prevent another decrease.       Keep glucose gel, raisins, or hard candy with you at all times to treat a low blood sugar level.     Your blood sugar level can get too low if you take diabetes medicine or insulin and do not eat enough food.     If you use insulin, check your blood sugar level before you exercise.      If your blood sugar level is below 100 mg/dL, eat 4 crackers or 2 ounces of raisins, or drink 4 ounces of juice.    Check your level every 30 minutes if you exercise longer than 1 hour.    You may need a snack during or after exercise.    What you can do to manage your blood sugar levels:   Check your blood sugar levels as directed and as needed.  Several items are available to use to check your levels. You may need to check by testing a drop of blood in a glucose monitor. You may instead be given a continuous glucose monitoring (CGM) device. The device is worn at all times. The CGM checks your blood sugar level every 5 minutes. It sends results to an electronic device such as a smart phone. A CGM can  be used with or without an insulin pump. You and your diabetes care team providers will decide on the best method for you. The goal for blood sugar levels before meals  is between 80 and 130 mg/dL and 2 hours after eating  is lower than 180 mg/dL.            Make healthy food choices.  Work with a dietitian to create a meal plan that works for you and your schedule. A dietitian can help you learn how to eat the right amount of carbohydrates (sugar and starchy foods) during your meals and snacks. Examples of carbohydrates are breads, cereals, rice, pasta, fruit, low-fat dairy, and sweets. Carbohydrates can raise your blood sugar level if you eat too many at one time.         Eat high-fiber foods as directed.  Fiber helps improve blood sugar levels. Fiber also lowers your risk for heart disease and other problems diabetes can cause. Examples of high-fiber foods include vegetables, whole-grain bread, and beans such as muniz beans. Your dietitian can tell you how much fiber to have each day.         Get regular physical activity.  Physical activity can help you get to your target blood sugar level goal and manage your weight. Get at least 150 minutes of moderate to vigorous aerobic physical activity each week. Resistance training, such as lifting weights, should be done 3 times each week. Do not miss more than 2 days of physical activity in a row. Do not sit longer than 30 minutes at a time. Your healthcare provider can help you create an activity plan. The plan can include the best activities for you and can help you build your strength and endurance.            Maintain a healthy weight.  Ask your team what a healthy weight is for you. A healthy weight can help you control diabetes and prevent heart disease. Ask your team to help you create a weight-loss plan, if needed. Even a loss of 3% to 7% of your excess body weight can help make a difference in managing diabetes. Your team will help you set a weight-loss goal,  such as 10 to 15 pounds, or 5% of your extra weight. Together you and your team can set manageable weight-loss goals.    Take your diabetes medicine or insulin as directed.  You may need diabetes medicine, insulin, or both to help control your blood sugar levels. Your healthcare provider will teach you how and when to take your diabetes medicine or insulin. You will also be taught about side effects oral diabetes medicine can cause. Insulin may be injected or given through a pump or pen. You and your providers will decide on the best method for you:    An insulin pump  is an implanted device that gives your insulin 24 hours a day. An insulin pump prevents the need for multiple insulin injections in a day.         An insulin pen  is a device prefilled with the right amount of insulin.         You and your family members will be taught how to draw up and give insulin  if this is the best method for you. Your providers will also teach you how to dispose of needles and syringes.    You will learn how much insulin you need  and when to give it. You will be taught when not to give insulin. You will also be taught what to do if your blood sugar level drops too low. This may happen if you take insulin and do not eat the right amount of carbohydrates.    More ways to manage type 2 diabetes:   Wear medical alert identification.  Wear medical alert jewelry or carry a card that says you have diabetes. Ask your provider where to get these items.         Do not smoke.  Nicotine and other chemicals in cigarettes and cigars can cause lung and blood vessel damage. It also makes it more difficult to manage your diabetes. Ask your provider for information if you currently smoke and need help to quit. Do not use e-cigarettes or smokeless tobacco in place of cigarettes or to help you quit. They still contain nicotine.    Check your feet each day for cuts, scratches, calluses, or other wounds.  Look for redness and swelling, and feel for  warmth. Wear shoes that fit well. Check your shoes for rocks or other objects that can hurt your feet. Do not walk barefoot or wear shoes without socks. Wear cotton socks to help keep your feet dry.         Ask about vaccines you may need.  You have a higher risk for serious illness if you get the flu, pneumonia, COVID-19, or hepatitis. Ask your provider if you should get vaccines to prevent these or other diseases, and when to get the vaccines.    Talk to your provider if you become stressed about diabetes care.  Sometimes being able to fit diabetes care into your life can cause increased stress. The stress can cause you not to take care of yourself properly. Your provider can help by offering tips about self-care. A mental health provider can listen and offer help with self-care issues. Other types of counseling can help you make nutrition or physical activity changes.    Have your A1c checked as directed.  Your provider may check your A1c every 3 months, or 2 times each year if your diabetes is controlled. An A1c test shows the average amount of sugar in your blood over the past 2 to 3 months. Your provider will tell you what your A1c level should be.    Have screening tests as directed.  Your provider may recommend screening for complications of diabetes and other conditions that may develop. Some screenings may begin right away and some may happen within the first 5 years of diagnosis:    Examples of diabetes complications  include kidney problems, high cholesterol, high blood pressure, blood vessel problems, eye problems, and sleep apnea.    You may be screened for a low vitamin B level  if you take oral diabetes medicine for a long time.    You may be screened for polycystic ovarian syndrome (PCOS)  if you are of childbearing age.    Follow up with your doctor or diabetes care team providers as directed:  You may need to have blood tests done before your follow-up visit. The test results will show if changes  need to be made in your treatment or self-care. Talk to your provider if you cannot afford your medicine. Write down your questions so you remember to ask them during your visits.  © Copyright [employer] 2023 Information is for End User's use only and may not be sold, redistributed or otherwise used for commercial purposes.  The above information is an  only. It is not intended as medical advice for individual conditions or treatments. Talk to your doctor, nurse or pharmacist before following any medical regimen to see if it is safe and effective for you.    Basic Carbohydrate Counting   AMBULATORY CARE:   Carbohydrate counting  is a way to plan your meals by counting the amount of carbohydrate in foods. Carbohydrates are the sugars, starches, and fiber found in fruit, grains, vegetables, and milk products. Carbohydrates increase your blood sugar levels. Carbohydrate counting can help you eat the right amount of carbohydrate to keep your blood sugar levels under control.   What you need to know about planning meals using carbohydrate counting:  A dietitian or healthcare provider will help you develop a healthy meal plan that works best for you. You will be taught how much carbohydrate to eat or drink for each meal and snack. Your meal plan will be based on your age, weight, usual food intake, and physical activity level. If you have diabetes, it will also include your blood sugar levels and diabetes medicine. Once you know how much carbohydrate you should eat, you can decide what type of food you want to eat.    You will need to know what foods contain carbohydrate and how much they contain. Keep track of the amount of carbohydrate in meals and snacks in order to follow your meal plan. Do not avoid carbohydrates or skip meals. Your blood sugar may fall too low if you do not eat enough carbohydrate or you skip meals.    Foods that contain carbohydrate:   Breads:  Each serving of food listed below  contains about 15 g of carbohydrate .    1 slice of bread (1 ounce) or 1 flour or corn tortilla (6 inch)    ½ of a hamburger bun or ¼ of a large bagel (about 1 ounce)    1 pancake (about 4 inches across and ¼ inch thick)    Cereals and grains:  Serving sizes of ready-to-eat cereals vary. Look at the serving size and the total carbohydrate amount listed on the food label. Each serving of food listed below contains about 15 g of carbohydrate .    ¾ cup of dry, unsweetened, ready-to-eat cereal or ¼ cup of low-fat granola     ½ cup of oatmeal or other cooked cereal     ? cup of cooked rice or pasta    Starchy vegetables and beans:  Each serving of food listed below contains about 15 g of carbohydrate .    ½ cup of corn, green peas, sweet potatoes, or mashed potatoes    ¼ of a large baked potato    ½ cup of beans, lentils, and peas (garbanzo, muniz, kidney, white, split, black-eyed)    Crackers and snacks:  Each serving of food listed below contains about 15 g of carbohydrate .    3 dixie cracker squares or 8 animal crackers     6 saltine-type crackers    3 cups of popcorn or ¾ ounce of pretzels, potato chips, or tortilla chips    Fruit:  Each serving of food listed below contains about 15 g of carbohydrate .    1 small (4 ounce) piece of fresh fruit or ¾ to 1 cup of fresh fruit    ½ cup of canned or frozen fruit, packed in natural juice    ½ cup (4 ounces) of unsweetened fruit juice    2 tablespoons of dried fruit    Desserts or sugary foods:  Each serving of food listed below contains about 15 g of carbohydrate .    2-inch square unfrosted cake or brownie     2 small cookies    ½ cup of ice cream, frozen yogurt, or nondairy frozen yogurt    ¼ cup of sherbet or sorbet    1 tablespoon of regular syrup, jam, or jelly    2 tablespoons of light syrup    Milk and yogurt:  Foods from the milk group contain about 12 g of carbohydrate per serving.    1 cup of fat-free or low-fat milk    1 cup of soy milk    ? cup of fat-free,  yogurt sweetened with artificial sweetener    Non-starchy vegetables:  Each serving contains about 5 g of carbohydrate . Three servings of non-starch vegetables count as 1 carbohydrate serving.     ½ cup of cooked vegetables or 1 cup of raw vegetables. This includes beets, broccoli, cabbage, cauliflower, cucumber, mushrooms, tomatoes, and zucchini    ½ cup of vegetable juice    How to use carbohydrate counting to plan meals:   Count carbohydrate amounts using serving sizes:      Pasta dinner example:  You plan to have pasta, tossed salad, and an 8-ounce glass of milk. Your healthcare provider tells you that you may have 4 carbohydrate servings for dinner. One carbohydrate serving of pasta is ? cup. One cup of pasta will equal 3 carbohydrate servings. An 8-ounce glass of milk will count as 1 carbohydrate serving. These amounts of food would equal 4 carbohydrate servings. One cup of tossed salad does not count toward your carbohydrate servings.       Count carbohydrate amounts using food labels:  Find the total amount of carbohydrate in a packaged food by reading the food label. Food labels tell you the serving size of the food and the total carbohydrate amount in each serving. Find the serving size on the food label and then decide how many servings you will eat. Multiply the number of servings you plan to eat by the carbohydrate amount per serving.     Granola bar snack example:  Your meal plan allows you to have 2 carbohydrate servings (30 grams) of carbohydrate for a snack. You plan to eat 1 package of granola bars, which contains 2 bars. According to the food label, the serving size of food in this package is 1 bar. Each serving (1 bar) contains 25 grams of carbohydrate. The total amount of carbohydrate in this package of granola bars would be 50 g. Based on your meal plan, you should eat only 1 bar.      Follow up with your doctor as directed:  Write down your questions so you remember to ask them during your  visits.  © Copyright [employer] 2023 Information is for End User's use only and may not be sold, redistributed or otherwise used for commercial purposes.  The above information is an  only. It is not intended as medical advice for individual conditions or treatments. Talk to your doctor, nurse or pharmacist before following any medical regimen to see if it is safe and effective for you.    Foot Care for People with Diabetes   AMBULATORY CARE:   What you need to know about foot care:  Long-term high blood sugar levels can damage the blood vessels and nerves in your legs and feet. This damage makes it hard to feel pressure, pain, temperature, and touch. You may not be able to feel a cut or sore, or shoes that are too tight. Foot care is needed to prevent serious problems, such as an infection or amputation. Diabetes may cause your toes to become crooked or curved under. These changes may affect the way you walk and can lead to increased pressure on your foot. The pressure can decrease blood flow to your feet. Lack of blood flow increases your risk for a foot ulcer.  Call your care team provider if:   Your feet become numb, weak, or hard to move.    You have pus draining from a sore on your foot.    You have a wound on your foot that gets bigger, deeper, or does not heal.    You see blisters, cuts, scratches, calluses, or sores on your foot.    You have a fever, and your feet become red, warm, and swollen.    Your toenails become thick, curled, or yellow.    You find it hard to check your feet because your vision is poor.    You have questions or concerns about your condition or care.    How to care for your feet:   Check your feet each day.  Look at your whole foot, including the bottom, and between and under your toes. Check for wounds, corns, and calluses. Use a mirror to see the bottom of your feet. The skin on your feet may be shiny, tight, or darker than normal. Your feet may also be cold and pale.  Feel your feet by running your hands along the tops, bottoms, sides, and between your toes. Redness, swelling, and warmth are signs of blood flow problems that can lead to a foot ulcer. Do not try to remove corns or calluses yourself. Do not ignore small problems, such as dry skin or small wounds. These can become life-threatening over time without proper care.         Wash your feet each day with soap and warm water.  Do not use hot water, because this can injure your foot. Dry your feet gently with a towel after you wash them. Dry between and under your toes.    Apply lotion or a moisturizer on your dry feet.  Ask your care team provider what lotions are best to use. Do not put lotion or moisturizer between your toes. Moisture between your toes could lead to skin breakdown.    Cut your toenails correctly.  File or cut your toenails straight across. Use a soft brush to clean around your toenails. If your toenails are very thick, you may need to have a care team provider or specialist cut them.     Protect your feet.  Do not walk barefoot or wear your shoes without socks. Check your shoes for rocks or other objects that can hurt your feet. Wear cotton socks to help keep your feet dry. Wear socks without toe seams, or wear them with the seams inside out. Change your socks each day. Do not wear socks that are dirty or damp.    Wear shoes that fit well.  Wear shoes that do not rub against any area of your feet. Your shoes should be ½ to ¾ inch (1 to 2 centimeters) longer than your feet. Your shoes should also have extra space around the widest part of your feet. Walking or athletic shoes with laces or straps that adjust are best. Ask your care team provider for help to choose shoes that fit you best. Ask your provider if you need to wear an insert, orthotic, or bandage on your feet.         Go to your follow-up visits.  Your care team provider will do a foot exam at least 1 time each year. You may need a foot exam more  often if you have nerve damage, foot deformities, or ulcers. Your provider will check for nerve damage and how well you can feel your feet. Your provider will check your shoes to see if they fit well.    Do not smoke.  Smoking can damage your blood vessels and put you at increased risk for foot ulcers. Ask your care team provider for information if you currently smoke and need help to quit. E-cigarettes or smokeless tobacco still contain nicotine. Talk to your care team provider before you use these products.    Follow up with your diabetes care team provider or foot specialist as directed:  You will need to have your feet checked at least 1 time each year. You may need a foot exam more often if you have nerve damage, foot deformities, or ulcers. Write down your questions so you remember to ask them during your visits.  © Copyright [employer] 2023 Information is for End User's use only and may not be sold, redistributed or otherwise used for commercial purposes.  The above information is an  only. It is not intended as medical advice for individual conditions or treatments. Talk to your doctor, nurse or pharmacist before following any medical regimen to see if it is safe and effective for you.

## 2024-02-19 ENCOUNTER — APPOINTMENT (OUTPATIENT)
Age: 44
End: 2024-02-19
Payer: COMMERCIAL

## 2024-02-19 DIAGNOSIS — Z11.4 SCREENING FOR HIV (HUMAN IMMUNODEFICIENCY VIRUS): ICD-10-CM

## 2024-02-19 DIAGNOSIS — Z11.59 NEED FOR HEPATITIS C SCREENING TEST: ICD-10-CM

## 2024-02-19 DIAGNOSIS — E11.9 DIET-CONTROLLED DIABETES MELLITUS (HCC): ICD-10-CM

## 2024-02-19 LAB
CHOLEST SERPL-MCNC: 204 MG/DL
CREAT UR-MCNC: 98.3 MG/DL
HCV AB SER QL: NORMAL
HDLC SERPL-MCNC: 33 MG/DL
HIV 1+2 AB+HIV1 P24 AG SERPL QL IA: NORMAL
HIV 2 AB SERPL QL IA: NORMAL
HIV1 AB SERPL QL IA: NORMAL
HIV1 P24 AG SERPL QL IA: NORMAL
LDLC SERPL CALC-MCNC: 129 MG/DL (ref 0–100)
MICROALBUMIN UR-MCNC: <7 MG/L
MICROALBUMIN/CREAT 24H UR: <7 MG/G CREATININE (ref 0–30)
TRIGL SERPL-MCNC: 210 MG/DL
TSH SERPL DL<=0.05 MIU/L-ACNC: 3.97 UIU/ML (ref 0.45–4.5)

## 2024-02-19 PROCEDURE — 84443 ASSAY THYROID STIM HORMONE: CPT

## 2024-02-19 PROCEDURE — 82043 UR ALBUMIN QUANTITATIVE: CPT

## 2024-02-19 PROCEDURE — 86803 HEPATITIS C AB TEST: CPT

## 2024-02-19 PROCEDURE — 36415 COLL VENOUS BLD VENIPUNCTURE: CPT

## 2024-02-19 PROCEDURE — 80061 LIPID PANEL: CPT

## 2024-02-19 PROCEDURE — 87389 HIV-1 AG W/HIV-1&-2 AB AG IA: CPT

## 2024-02-19 PROCEDURE — 82570 ASSAY OF URINE CREATININE: CPT

## 2024-02-22 DIAGNOSIS — R10.13 DYSPEPSIA: ICD-10-CM

## 2024-02-22 RX ORDER — PANTOPRAZOLE SODIUM 40 MG/1
TABLET, DELAYED RELEASE ORAL
Qty: 30 TABLET | Refills: 5 | Status: SHIPPED | OUTPATIENT
Start: 2024-02-22

## 2024-02-23 ENCOUNTER — TELEPHONE (OUTPATIENT)
Dept: FAMILY MEDICINE CLINIC | Facility: CLINIC | Age: 44
End: 2024-02-23

## 2024-02-26 DIAGNOSIS — E78.5 ELEVATED LIPIDS: ICD-10-CM

## 2024-02-26 DIAGNOSIS — E11.65 TYPE 2 DIABETES MELLITUS WITH HYPERGLYCEMIA, WITHOUT LONG-TERM CURRENT USE OF INSULIN (HCC): Primary | ICD-10-CM

## 2024-02-26 RX ORDER — ROSUVASTATIN CALCIUM 5 MG/1
5 TABLET, COATED ORAL DAILY
Qty: 90 TABLET | Refills: 0 | Status: SHIPPED | OUTPATIENT
Start: 2024-02-26

## 2024-05-19 DIAGNOSIS — E78.5 ELEVATED LIPIDS: ICD-10-CM

## 2024-05-19 DIAGNOSIS — E11.65 TYPE 2 DIABETES MELLITUS WITH HYPERGLYCEMIA, WITHOUT LONG-TERM CURRENT USE OF INSULIN (HCC): ICD-10-CM

## 2024-05-20 RX ORDER — ROSUVASTATIN CALCIUM 5 MG/1
5 TABLET, COATED ORAL DAILY
Qty: 90 TABLET | Refills: 1 | Status: SHIPPED | OUTPATIENT
Start: 2024-05-20

## 2024-06-06 ENCOUNTER — RA CDI HCC (OUTPATIENT)
Dept: OTHER | Facility: HOSPITAL | Age: 44
End: 2024-06-06

## 2024-06-13 ENCOUNTER — OFFICE VISIT (OUTPATIENT)
Dept: FAMILY MEDICINE CLINIC | Facility: CLINIC | Age: 44
End: 2024-06-13
Payer: COMMERCIAL

## 2024-06-13 ENCOUNTER — HOSPITAL ENCOUNTER (OUTPATIENT)
Dept: RADIOLOGY | Facility: HOSPITAL | Age: 44
End: 2024-06-13
Payer: COMMERCIAL

## 2024-06-13 VITALS
TEMPERATURE: 97.8 F | HEIGHT: 65 IN | DIASTOLIC BLOOD PRESSURE: 74 MMHG | SYSTOLIC BLOOD PRESSURE: 120 MMHG | RESPIRATION RATE: 18 BRPM | OXYGEN SATURATION: 98 % | WEIGHT: 269.6 LBS | HEART RATE: 88 BPM | BODY MASS INDEX: 44.92 KG/M2

## 2024-06-13 DIAGNOSIS — M25.552 ACUTE HIP PAIN, LEFT: ICD-10-CM

## 2024-06-13 DIAGNOSIS — M06.9 RHEUMATOID ARTHRITIS INVOLVING MULTIPLE SITES, UNSPECIFIED WHETHER RHEUMATOID FACTOR PRESENT (HCC): ICD-10-CM

## 2024-06-13 DIAGNOSIS — L03.031 PARONYCHIA OF GREAT TOE OF RIGHT FOOT: Primary | ICD-10-CM

## 2024-06-13 DIAGNOSIS — R20.0 NUMBNESS: ICD-10-CM

## 2024-06-13 DIAGNOSIS — F41.8 DEPRESSION WITH ANXIETY: ICD-10-CM

## 2024-06-13 DIAGNOSIS — E11.65 TYPE 2 DIABETES MELLITUS WITH HYPERGLYCEMIA, WITHOUT LONG-TERM CURRENT USE OF INSULIN (HCC): ICD-10-CM

## 2024-06-13 PROBLEM — R04.0 EPISTAXIS: Status: RESOLVED | Noted: 2024-02-12 | Resolved: 2024-06-13

## 2024-06-13 LAB — SL AMB POCT HEMOGLOBIN AIC: 7.2 (ref ?–6.5)

## 2024-06-13 PROCEDURE — 83036 HEMOGLOBIN GLYCOSYLATED A1C: CPT | Performed by: NURSE PRACTITIONER

## 2024-06-13 PROCEDURE — 73502 X-RAY EXAM HIP UNI 2-3 VIEWS: CPT

## 2024-06-13 PROCEDURE — 99214 OFFICE O/P EST MOD 30 MIN: CPT | Performed by: NURSE PRACTITIONER

## 2024-06-13 RX ORDER — CEPHALEXIN 500 MG/1
500 CAPSULE ORAL 3 TIMES DAILY
Qty: 30 CAPSULE | Refills: 0 | Status: SHIPPED | OUTPATIENT
Start: 2024-06-13 | End: 2024-06-23

## 2024-06-13 RX ORDER — BUSPIRONE HYDROCHLORIDE 5 MG/1
5 TABLET ORAL 2 TIMES DAILY
Start: 2024-06-13

## 2024-06-13 NOTE — ASSESSMENT & PLAN NOTE
Due to history of rheumatoid arthritis, will proceed with hip x-ray.  To begin physical therapy.  To follow-up if no improvement after course of physical therapy or sooner if symptoms worsen.

## 2024-06-13 NOTE — ASSESSMENT & PLAN NOTE
Lab Results   Component Value Date    HGBA1C 7.2 (A) 06/13/2024   A1c 7.2% today.  Significantly improved from prior A1c.  To continue Jardiance 10 mg daily.  To continue low-carb diet, working on weight loss, and engaging in daily physical activity as tolerated.

## 2024-06-13 NOTE — PROGRESS NOTES
Assessment/Plan:    Paronychia of great toe of right foot  To begin Keflex every 8 hours for 10 days.  Counseled on initiation warm soaks 4-5 times a day for 20 minutes at a time.  Cautioned patient about pedicures and advised patient to avoid any cutting or trimming of cuticles to reduce risk of infection.  Follow-up if no improvement in 1 week or sooner if symptoms worsen.    Rheumatoid arthritis involving multiple sites ([facility])  Stable.  To continue current medication regimen.  To continue care with rheumatology.    Depression with anxiety  Stable.  Patient is currently working with psychiatrist to titrate off some medications.  To continue care with psychiatry.    BMI 40.0-44.9, adult ([facility])  Counseled on the importance of weight loss, healthy food choices, engaging in daily physical activity, and reducing BMI.     Numbness  Patient is experiencing numbness in her right thigh after prolonged standing.  No weakness in right extremity on exam.  Sensate sensation to light touch intact.  To proceed with EMG, will call with results.    Acute hip pain, left  Due to history of rheumatoid arthritis, will proceed with hip x-ray.  To begin physical therapy.  To follow-up if no improvement after course of physical therapy or sooner if symptoms worsen.    Type 2 diabetes mellitus with hyperglycemia, without long-term current use of insulin ([facility])    Lab Results   Component Value Date    HGBA1C 7.2 (A) 06/13/2024   A1c 7.2% today.  Significantly improved from prior A1c.  To continue Jardiance 10 mg daily.  To continue low-carb diet, working on weight loss, and engaging in daily physical activity as tolerated.         Diagnoses and all orders for this visit:    Paronychia of great toe of right foot  -     cephalexin (KEFLEX) 500 mg capsule; Take 1 capsule (500 mg total) by mouth 3 (three) times a day for 10 days    Depression with anxiety  -     busPIRone (BUSPAR) 5 mg tablet; Take 1 tablet (5 mg total) by mouth 2 (two)  times a day    Numbness  -     EMG 1 Limb; Future    Acute hip pain, left  -     Cancel: XR hip/pelv 4+ vw left if performed; Future  -     Ambulatory Referral to Physical Therapy; Future    Rheumatoid arthritis involving multiple sites, unspecified whether rheumatoid factor present ([facility])    Type 2 diabetes mellitus with hyperglycemia, without long-term current use of insulin ([facility])  -     POCT hemoglobin A1c    BMI 40.0-44.9, adult ([facility])          Subjective:      Patient ID: Vanessa Torres is a 43 y.o. female.    Vanessa presents for a follow-up.  She is reporting right great toe discomfort and pain that started shortly after she had a pedicure on Saturday.  Denies fever, chills, or drainage.  She is also reporting intermittent right thigh pain.  This is aggravated by prolonged standing and improved with rest.  Denies recent injury, lower back pain, or weakness in extremity.  Yesterday, she noted left hip and left groin pain.  It is worsened when lifting her leg up or movement and certain positions.  Denies recent/prior injury, swelling, paresthesias, or history of similar symptoms.  She has been monitoring her blood sugars.  It is around 116 in the morning and between 160 and 180 in the evening.  She is working with her psychiatrist due to titrate off some medications.            The following portions of the patient's history were reviewed and updated as appropriate: She   Patient Active Problem List    Diagnosis Date Noted    Paronychia of great toe of right foot 06/13/2024    Numbness 06/13/2024    Acute hip pain, left 06/13/2024    Type 2 diabetes mellitus with hyperglycemia, without long-term current use of insulin ([facility]) 09/26/2021    Abnormal stress ECG with treadmill 09/07/2021    Panic disorder without agoraphobia with severe panic attacks 06/04/2021    Irritable bowel syndrome with diarrhea 04/22/2021    NEO (obstructive sleep apnea)     Menorrhagia with regular cycle 04/08/2019    Post traumatic  stress disorder (PTSD) 01/25/2019    Depression with anxiety 12/10/2018    Gastroesophageal reflux disease without esophagitis 03/27/2018    BMI 40.0-44.9, adult ([facility]) 03/27/2018    Post-surgical hypothyroidism 03/27/2018    Rheumatoid arthritis involving multiple sites ([facility]) 03/27/2018    Thyroid cancer ([facility]) 03/27/2018     Current Outpatient Medications   Medication Sig Dispense Refill    albuterol (PROVENTIL HFA,VENTOLIN HFA) 90 mcg/act inhaler Inhale 2 puffs 4 (four) times a day 8 g 0    ARIPiprazole (ABILIFY) 5 mg tablet Take 5 mg by mouth daily      ascorbic Acid (VITAMIN C) 500 MG CPCR Take 500 mg by mouth daily      busPIRone (BUSPAR) 5 mg tablet Take 1 tablet (5 mg total) by mouth 2 (two) times a day      cephalexin (KEFLEX) 500 mg capsule Take 1 capsule (500 mg total) by mouth 3 (three) times a day for 10 days 30 capsule 0    Cholecalciferol (VITAMIN D-3) 5000 units TABS Take 5,000 Units by mouth      Empagliflozin (Jardiance) 10 MG TABS tablet Take 1 tablet (10 mg total) by mouth in the morning 90 tablet 1    fluticasone (FLONASE) 50 mcg/act nasal spray 1 spray into each nostril daily 9.9 mL 0    folic acid (FOLVITE) 1 mg tablet       levothyroxine 112 mcg tablet Take 224 mcg by mouth daily      methotrexate 2.5 mg tablet Take 5 tablets by mouth once a week      nystatin (MYCOSTATIN) powder Apply topically 3 (three) times a day 30 g 1    pantoprazole (PROTONIX) 40 mg tablet TAKE ONE TABLET BY MOUTH EVERY DAY 30 tablet 5    prazosin (MINIPRESS) 1 mg capsule Take 2 mg by mouth daily at bedtime      rosuvastatin (CRESTOR) 5 mg tablet Take 1 tablet (5 mg total) by mouth daily 90 tablet 1    sertraline (ZOLOFT) 100 mg tablet Take 100 mg by mouth daily      traZODone (DESYREL) 50 mg tablet Take 1 tablet by mouth daily as needed       No current facility-administered medications for this visit.     She has No Known Allergies..    Review of Systems   Constitutional: Negative.    Respiratory: Negative.    "  Cardiovascular: Negative.    Gastrointestinal: Negative.    Musculoskeletal:  Positive for arthralgias.        Left hip pain   Skin:  Positive for color change (right great toe).   Neurological:  Positive for numbness (right thigh).   Psychiatric/Behavioral: Negative.           /74 (BP Location: Left arm, Patient Position: Sitting)   Pulse 88   Temp 97.8 °F (36.6 °C)   Resp 18   Ht 5' 5\" (1.651 m)   Wt 122 kg (269 lb 9.6 oz)   SpO2 98%   BMI 44.86 kg/m²     Objective:     Physical Exam  Vitals and nursing note reviewed.   Constitutional:       General: She is not in acute distress.     Appearance: Normal appearance. She is well-developed. She is not ill-appearing, toxic-appearing or diaphoretic.   HENT:      Head: Normocephalic and atraumatic.   Eyes:      Conjunctiva/sclera: Conjunctivae normal.   Cardiovascular:      Rate and Rhythm: Normal rate and regular rhythm.      Heart sounds: Normal heart sounds. No murmur heard.  Pulmonary:      Effort: Pulmonary effort is normal. No respiratory distress.      Breath sounds: Normal breath sounds. No wheezing or rales.   Chest:      Chest wall: No tenderness.   Musculoskeletal:      Cervical back: Neck supple.      Comments: No obvious deformity in right hip. Full ROM intact. Pain with abduction and internal rotation. Strength 5/5 in lower extremities.    Skin:     General: Skin is warm and dry.      Capillary Refill: Capillary refill takes less than 2 seconds.      Comments: Presence of mild swelling, erythema and warmth in right great toe   Neurological:      General: No focal deficit present.      Mental Status: She is alert and oriented to person, place, and time.   Psychiatric:         Mood and Affect: Mood normal.         Behavior: Behavior normal.         Thought Content: Thought content normal.         Judgment: Judgment normal.         "

## 2024-06-13 NOTE — ASSESSMENT & PLAN NOTE
To begin Keflex every 8 hours for 10 days.  Counseled on initiation warm soaks 4-5 times a day for 20 minutes at a time.  Cautioned patient about pedicures and advised patient to avoid any cutting or trimming of cuticles to reduce risk of infection.  Follow-up if no improvement in 1 week or sooner if symptoms worsen.

## 2024-06-13 NOTE — ASSESSMENT & PLAN NOTE
Stable.  Patient is currently working with psychiatrist to titrate off some medications.  To continue care with psychiatry.

## 2024-06-24 ENCOUNTER — OFFICE VISIT (OUTPATIENT)
Age: 44
End: 2024-06-24
Payer: COMMERCIAL

## 2024-06-24 ENCOUNTER — HOSPITAL ENCOUNTER (OUTPATIENT)
Dept: RADIOLOGY | Facility: HOSPITAL | Age: 44
Discharge: HOME | End: 2024-06-24
Payer: COMMERCIAL

## 2024-06-24 VITALS
HEART RATE: 89 BPM | RESPIRATION RATE: 17 BRPM | SYSTOLIC BLOOD PRESSURE: 108 MMHG | HEIGHT: 65 IN | BODY MASS INDEX: 44.82 KG/M2 | WEIGHT: 269 LBS | DIASTOLIC BLOOD PRESSURE: 73 MMHG

## 2024-06-24 DIAGNOSIS — L03.031 PARONYCHIA OF TOENAIL OF RIGHT FOOT: ICD-10-CM

## 2024-06-24 DIAGNOSIS — L03.031 PARONYCHIA OF GREAT TOE OF RIGHT FOOT: ICD-10-CM

## 2024-06-24 DIAGNOSIS — E11.9 TYPE 2 DIABETES MELLITUS WITHOUT COMPLICATION, WITHOUT LONG-TERM CURRENT USE OF INSULIN (HCC): ICD-10-CM

## 2024-06-24 DIAGNOSIS — B35.1 ONYCHOMYCOSIS: ICD-10-CM

## 2024-06-24 DIAGNOSIS — L03.116 CELLULITIS OF LEFT ANKLE: ICD-10-CM

## 2024-06-24 DIAGNOSIS — L03.031 CELLULITIS OF TOE, RIGHT: ICD-10-CM

## 2024-06-24 DIAGNOSIS — M79.671 RIGHT FOOT PAIN: ICD-10-CM

## 2024-06-24 DIAGNOSIS — L03.031 CELLULITIS OF TOE, RIGHT: Primary | ICD-10-CM

## 2024-06-24 DIAGNOSIS — L89.91 PRESSURE ULCER WITH SUSPECTED DEEP TISSUE INJURY, STAGE I: ICD-10-CM

## 2024-06-24 PROCEDURE — 99203 OFFICE O/P NEW LOW 30 MIN: CPT | Performed by: PODIATRIST

## 2024-06-24 PROCEDURE — 87070 CULTURE OTHR SPECIMN AEROBIC: CPT | Performed by: PODIATRIST

## 2024-06-24 PROCEDURE — 87205 SMEAR GRAM STAIN: CPT | Performed by: PODIATRIST

## 2024-06-24 PROCEDURE — 73630 X-RAY EXAM OF FOOT: CPT

## 2024-06-24 RX ORDER — LEVOFLOXACIN 500 MG/1
500 TABLET, FILM COATED ORAL EVERY 24 HOURS
Qty: 7 TABLET | Refills: 0 | Status: SHIPPED | OUTPATIENT
Start: 2024-06-24 | End: 2024-07-01

## 2024-06-24 NOTE — PROGRESS NOTES
Assessment/Plan: Cellulitis and paronychia right hallux.  Subungual mycosis.  Diabetic patient without complication.  Pain.    Plan.  Chart reviewed.  PCP notes reviewed.  Diabetic foot exam performed.  Patient educated in the care of the diabetic foot.  Nail debrided today.  Culture and sensitivity done.  Gentian violet dry sterile dressing applied.  Patient be started on topical as well as oral antibiotic.  Return for follow-up.  X-rays ordered to rule out osteomyelitis.  Patient will need treatment for fungus.         Diagnoses and all orders for this visit:    Cellulitis of toe, right  -     XR foot 3+ vw right; Future  -     levofloxacin (LEVAQUIN) 500 mg tablet; Take 1 tablet (500 mg total) by mouth every 24 hours for 7 days    Paronychia of great toe of right foot  -     Ambulatory Referral to Podiatry    Paronychia of toenail of right foot  -     levofloxacin (LEVAQUIN) 500 mg tablet; Take 1 tablet (500 mg total) by mouth every 24 hours for 7 days    Right foot pain    Onychomycosis    Type 2 diabetes mellitus without complication, without long-term current use of insulin ([facility])          Subjective: Patient has pain.  She has pain in her right big toe.  No history of trauma            No Known Allergies      Current Outpatient Medications:     levofloxacin (LEVAQUIN) 500 mg tablet, Take 1 tablet (500 mg total) by mouth every 24 hours for 7 days, Disp: 7 tablet, Rfl: 0    albuterol (PROVENTIL HFA,VENTOLIN HFA) 90 mcg/act inhaler, Inhale 2 puffs 4 (four) times a day, Disp: 8 g, Rfl: 0    ARIPiprazole (ABILIFY) 5 mg tablet, Take 5 mg by mouth daily, Disp: , Rfl:     ascorbic Acid (VITAMIN C) 500 MG CPCR, Take 500 mg by mouth daily, Disp: , Rfl:     busPIRone (BUSPAR) 5 mg tablet, Take 1 tablet (5 mg total) by mouth 2 (two) times a day, Disp: , Rfl:     Cholecalciferol (VITAMIN D-3) 5000 units TABS, Take 5,000 Units by mouth, Disp: , Rfl:     Empagliflozin (Jardiance) 10 MG TABS tablet, Take 1 tablet (10 mg  total) by mouth in the morning, Disp: 90 tablet, Rfl: 1    fluticasone (FLONASE) 50 mcg/act nasal spray, 1 spray into each nostril daily, Disp: 9.9 mL, Rfl: 0    folic acid (FOLVITE) 1 mg tablet, , Disp: , Rfl:     levothyroxine 112 mcg tablet, Take 224 mcg by mouth daily, Disp: , Rfl:     methotrexate 2.5 mg tablet, Take 5 tablets by mouth once a week, Disp: , Rfl:     nystatin (MYCOSTATIN) powder, Apply topically 3 (three) times a day, Disp: 30 g, Rfl: 1    pantoprazole (PROTONIX) 40 mg tablet, TAKE ONE TABLET BY MOUTH EVERY DAY, Disp: 30 tablet, Rfl: 5    prazosin (MINIPRESS) 1 mg capsule, Take 2 mg by mouth daily at bedtime, Disp: , Rfl:     rosuvastatin (CRESTOR) 5 mg tablet, Take 1 tablet (5 mg total) by mouth daily, Disp: 90 tablet, Rfl: 1    sertraline (ZOLOFT) 100 mg tablet, Take 100 mg by mouth daily, Disp: , Rfl:     traZODone (DESYREL) 50 mg tablet, Take 1 tablet by mouth daily as needed, Disp: , Rfl:     Patient Active Problem List   Diagnosis    Gastroesophageal reflux disease without esophagitis    BMI 40.0-44.9, adult ([facility])    Post-surgical hypothyroidism    Rheumatoid arthritis involving multiple sites ([facility])    Thyroid cancer ([facility])    Depression with anxiety    Post traumatic stress disorder (PTSD)    Menorrhagia with regular cycle    NEO (obstructive sleep apnea)    Irritable bowel syndrome with diarrhea    Abnormal stress ECG with treadmill    Type 2 diabetes mellitus with hyperglycemia, without long-term current use of insulin ([facility])    Panic disorder without agoraphobia with severe panic attacks    Paronychia of great toe of right foot    Numbness    Acute hip pain, left          Patient ID: Vanessa Torres is a 43 y.o. female.    HPI    The following portions of the patient's history were reviewed and updated as appropriate: She  has a past medical history of Arthritis, Cancer (HCC), Colon polyp, CPAP (continuous positive airway pressure) dependence, NEO (obstructive sleep apnea), Sleep  apnea, Thyroid cancer (HCC), and Varicella.  She   Patient Active Problem List    Diagnosis Date Noted    Paronychia of great toe of right foot 06/13/2024    Numbness 06/13/2024    Acute hip pain, left 06/13/2024    Type 2 diabetes mellitus with hyperglycemia, without long-term current use of insulin ([facility]) 09/26/2021    Abnormal stress ECG with treadmill 09/07/2021    Panic disorder without agoraphobia with severe panic attacks 06/04/2021    Irritable bowel syndrome with diarrhea 04/22/2021    NEO (obstructive sleep apnea)     Menorrhagia with regular cycle 04/08/2019    Post traumatic stress disorder (PTSD) 01/25/2019    Depression with anxiety 12/10/2018    Gastroesophageal reflux disease without esophagitis 03/27/2018    BMI 40.0-44.9, adult ([facility]) 03/27/2018    Post-surgical hypothyroidism 03/27/2018    Rheumatoid arthritis involving multiple sites ([facility]) 03/27/2018    Thyroid cancer ([facility]) 03/27/2018     She  has a past surgical history that includes Cholecystectomy; Thyroidectomy; Knee surgery (Left); Knee surgery; Colonoscopy; Skin graft (Right); and US guided thyroid biopsy (4/13/2018).  Her family history includes Breast cancer (age of onset: 54) in her maternal aunt; COPD in her mother; Emphysema in her maternal grandfather; Heart disease in her maternal grandfather and maternal grandmother; Hypertension in her maternal grandmother; Lung cancer in her maternal grandmother; No Known Problems in her father, maternal aunt, paternal aunt, paternal aunt, paternal aunt, paternal aunt, paternal grandmother, sister, sister, sister, and sister; Rheum arthritis in her mother; Seizures in her mother.  She  reports that she quit smoking about 8 years ago. Her smoking use included cigarettes. She started smoking about 31 years ago. She has a 11.3 pack-year smoking history. She has never used smokeless tobacco. She reports that she does not currently use alcohol. She reports that she does not use drugs.  Current Outpatient  Medications   Medication Sig Dispense Refill    levofloxacin (LEVAQUIN) 500 mg tablet Take 1 tablet (500 mg total) by mouth every 24 hours for 7 days 7 tablet 0    albuterol (PROVENTIL HFA,VENTOLIN HFA) 90 mcg/act inhaler Inhale 2 puffs 4 (four) times a day 8 g 0    ARIPiprazole (ABILIFY) 5 mg tablet Take 5 mg by mouth daily      ascorbic Acid (VITAMIN C) 500 MG CPCR Take 500 mg by mouth daily      busPIRone (BUSPAR) 5 mg tablet Take 1 tablet (5 mg total) by mouth 2 (two) times a day      Cholecalciferol (VITAMIN D-3) 5000 units TABS Take 5,000 Units by mouth      Empagliflozin (Jardiance) 10 MG TABS tablet Take 1 tablet (10 mg total) by mouth in the morning 90 tablet 1    fluticasone (FLONASE) 50 mcg/act nasal spray 1 spray into each nostril daily 9.9 mL 0    folic acid (FOLVITE) 1 mg tablet       levothyroxine 112 mcg tablet Take 224 mcg by mouth daily      methotrexate 2.5 mg tablet Take 5 tablets by mouth once a week      nystatin (MYCOSTATIN) powder Apply topically 3 (three) times a day 30 g 1    pantoprazole (PROTONIX) 40 mg tablet TAKE ONE TABLET BY MOUTH EVERY DAY 30 tablet 5    prazosin (MINIPRESS) 1 mg capsule Take 2 mg by mouth daily at bedtime      rosuvastatin (CRESTOR) 5 mg tablet Take 1 tablet (5 mg total) by mouth daily 90 tablet 1    sertraline (ZOLOFT) 100 mg tablet Take 100 mg by mouth daily      traZODone (DESYREL) 50 mg tablet Take 1 tablet by mouth daily as needed       No current facility-administered medications for this visit.     Current Outpatient Medications on File Prior to Visit   Medication Sig    albuterol (PROVENTIL HFA,VENTOLIN HFA) 90 mcg/act inhaler Inhale 2 puffs 4 (four) times a day    ARIPiprazole (ABILIFY) 5 mg tablet Take 5 mg by mouth daily    ascorbic Acid (VITAMIN C) 500 MG CPCR Take 500 mg by mouth daily    busPIRone (BUSPAR) 5 mg tablet Take 1 tablet (5 mg total) by mouth 2 (two) times a day    [] cephalexin (KEFLEX) 500 mg capsule Take 1 capsule (500 mg total) by  mouth 3 (three) times a day for 10 days    Cholecalciferol (VITAMIN D-3) 5000 units TABS Take 5,000 Units by mouth    Empagliflozin (Jardiance) 10 MG TABS tablet Take 1 tablet (10 mg total) by mouth in the morning    fluticasone (FLONASE) 50 mcg/act nasal spray 1 spray into each nostril daily    folic acid (FOLVITE) 1 mg tablet     levothyroxine 112 mcg tablet Take 224 mcg by mouth daily    methotrexate 2.5 mg tablet Take 5 tablets by mouth once a week    nystatin (MYCOSTATIN) powder Apply topically 3 (three) times a day    pantoprazole (PROTONIX) 40 mg tablet TAKE ONE TABLET BY MOUTH EVERY DAY    prazosin (MINIPRESS) 1 mg capsule Take 2 mg by mouth daily at bedtime    rosuvastatin (CRESTOR) 5 mg tablet Take 1 tablet (5 mg total) by mouth daily    sertraline (ZOLOFT) 100 mg tablet Take 100 mg by mouth daily    traZODone (DESYREL) 50 mg tablet Take 1 tablet by mouth daily as needed     No current facility-administered medications on file prior to visit.     She has No Known Allergies..    Vitals:    06/24/24 1037   BP: 108/73   Pulse: 89   Resp: 17       Review of Systems      Objective:  Patient's shoes and socks removed.   Foot Exam    General  General Appearance: appears stated age and healthy   Orientation: alert and oriented to person, place, and time   Affect: appropriate       Right Foot/Ankle     Inspection and Palpation  Swelling: dorsum   Arch: pes planus    Neurovascular  Dorsalis pedis: 3+  Posterior tibial: 3+  Saphenous nerve sensation: normal  Tibial nerve sensation: normal  Superficial peroneal nerve sensation: normal  Deep peroneal nerve sensation: normal  Sural nerve sensation: normal      Left Foot/Ankle      Inspection and Palpation  Swelling: dorsum   Arch: pes planus    Neurovascular  Dorsalis pedis: 3+  Posterior tibial: 3+  Saphenous nerve sensation: normal  Tibial nerve sensation: normal  Superficial peroneal nerve sensation: normal  Deep peroneal nerve sensation: normal  Sural nerve  sensation: normal      Physical Exam  Vitals and nursing note reviewed.   Constitutional:       Appearance: Normal appearance.   Cardiovascular:      Rate and Rhythm: Normal rate and regular rhythm.      Pulses:           Dorsalis pedis pulses are 3+ on the right side and 3+ on the left side.        Posterior tibial pulses are 3+ on the right side and 3+ on the left side.   Feet:      Comments: Right hallux demonstrates subungual mycosis.  In addition there is dried pus noted within the area.  Culture and sensitivity done.  Negative sinus.  Skin:     Capillary Refill: Capillary refill takes less than 2 seconds.   Neurological:      Mental Status: She is alert.   Psychiatric:         Mood and Affect: Mood normal.         Behavior: Behavior normal.         Thought Content: Thought content normal.         Judgment: Judgment normal.     Right Foot/Ankle   Right Foot Inspection      Vascular  The right DP pulse is 3+. The right PT pulse is 3+.     Right Toe  - Comprehensive Exam  Arch: pes planus  Swelling: dorsum       Left Foot/Ankle  Left Foot Inspection      Vascular  The left DP pulse is 3+. The left PT pulse is 3+.     Left Toe  - Comprehensive Exam  Arch: pes planus  Swelling: dorsum

## 2024-06-25 ENCOUNTER — TELEPHONE (OUTPATIENT)
Age: 44
End: 2024-06-25

## 2024-06-25 DIAGNOSIS — R20.0 NUMBNESS: Primary | ICD-10-CM

## 2024-06-25 NOTE — TELEPHONE ENCOUNTER
Lmom for patient to call back in regards to her x-ray results per dr don patient results are as followed ://        Please advise patient that at this time x-ray shows no evidence of bone infection

## 2024-06-25 NOTE — TELEPHONE ENCOUNTER
Reason for call:   [x] Refill   [] Prior Auth  [] Other:     Office:   [x] PCP/Provider - [facility] [address] [facility]      [] Specialty/Provider -     Medication:   folic acid (FOLVITE) 1 mg tablet     Dose/Frequency: 1 tablet daily    Quantity: 90    Pharmacy: [place] [address] [phone]     Does the patient have enough for 3 days?   [x] Yes   [] No - Send as HP to POD

## 2024-06-25 NOTE — TELEPHONE ENCOUNTER
----- Message from Ricardo Hargrove DPM sent at 6/25/2024  2:00 PM EDT -----  Please advise patient that at this time x-ray shows no evidence of bone infection  ----- Message -----  From: Kristin, Radiology Results In  Sent: 6/25/2024   1:48 AM EDT  To: Ricardo Hargrove DPM

## 2024-06-25 NOTE — TELEPHONE ENCOUNTER
Spoke with patient in regards to her x-ray results patient is aware of her results , she did ask about the culture did inform the patient once we receive the results we will contact her as well

## 2024-06-25 NOTE — TELEPHONE ENCOUNTER
Caller: Patient     Doctor/Office: Delvin     Call regarding :  X-Ray results      Call was transferred to: [place]

## 2024-06-26 LAB
BACTERIA WND AEROBE CULT: NORMAL
GRAM STN SPEC: NORMAL
GRAM STN SPEC: NORMAL

## 2024-06-27 RX ORDER — FOLIC ACID 1 MG/1
TABLET ORAL
Qty: 90 TABLET | Refills: 1 | Status: SHIPPED | OUTPATIENT
Start: 2024-06-27

## 2024-07-01 ENCOUNTER — TELEPHONE (OUTPATIENT)
Age: 44
End: 2024-07-01

## 2024-07-01 NOTE — TELEPHONE ENCOUNTER
LMOM letting know doctor did get the message and picture and per doctor it appears to be a bruise and if there is no pain there is no problem. But id there is she can call us and we can set something up.

## 2024-07-22 DIAGNOSIS — E11.9 DIET-CONTROLLED DIABETES MELLITUS (HCC): ICD-10-CM

## 2024-07-22 RX ORDER — EMPAGLIFLOZIN 10 MG/1
10 TABLET, FILM COATED ORAL EVERY MORNING
Qty: 90 TABLET | Refills: 1 | Status: SHIPPED | OUTPATIENT
Start: 2024-07-22 | End: 2024-07-29 | Stop reason: SDUPTHER

## 2024-07-29 DIAGNOSIS — E11.9 DIET-CONTROLLED DIABETES MELLITUS (HCC): ICD-10-CM

## 2024-08-09 ENCOUNTER — TELEPHONE (OUTPATIENT)
Age: 44
End: 2024-08-09

## 2024-08-13 ENCOUNTER — TELEPHONE (OUTPATIENT)
Age: 44
End: 2024-08-13

## 2024-08-13 NOTE — TELEPHONE ENCOUNTER
Lmom.. we have no available appointments.  I told her to call the call center daily to see if they have any same day appointments

## 2024-08-13 NOTE — TELEPHONE ENCOUNTER
Caller: Patient- Vanessa Torres    Doctor: Dr Hargrove    Reason for call: Patient is calling in stating that she is not able to make her appointment today 8/13 due to her having a flat tire. She is not able to get back into the office until 9/12 and wanted to know if she was able to be seen sooner relating her right foot.    Call back#: [phone]

## 2024-08-17 DIAGNOSIS — R10.13 DYSPEPSIA: ICD-10-CM

## 2024-08-18 RX ORDER — PANTOPRAZOLE SODIUM 40 MG/1
TABLET, DELAYED RELEASE ORAL
Qty: 30 TABLET | Refills: 5 | Status: SHIPPED | OUTPATIENT
Start: 2024-08-18

## 2024-08-19 ENCOUNTER — OFFICE VISIT (OUTPATIENT)
Dept: FAMILY MEDICINE CLINIC | Facility: CLINIC | Age: 44
End: 2024-08-19
Payer: COMMERCIAL

## 2024-08-19 ENCOUNTER — OFFICE VISIT (OUTPATIENT)
Age: 44
End: 2024-08-19
Payer: COMMERCIAL

## 2024-08-19 ENCOUNTER — TELEPHONE (OUTPATIENT)
Dept: FAMILY MEDICINE CLINIC | Facility: CLINIC | Age: 44
End: 2024-08-19

## 2024-08-19 VITALS
HEIGHT: 67 IN | OXYGEN SATURATION: 98 % | WEIGHT: 275 LBS | DIASTOLIC BLOOD PRESSURE: 74 MMHG | SYSTOLIC BLOOD PRESSURE: 118 MMHG | HEART RATE: 95 BPM | BODY MASS INDEX: 43.16 KG/M2 | TEMPERATURE: 97.9 F

## 2024-08-19 VITALS
HEART RATE: 88 BPM | HEIGHT: 65 IN | WEIGHT: 275.5 LBS | SYSTOLIC BLOOD PRESSURE: 118 MMHG | BODY MASS INDEX: 45.9 KG/M2 | OXYGEN SATURATION: 98 % | TEMPERATURE: 97.9 F | DIASTOLIC BLOOD PRESSURE: 76 MMHG

## 2024-08-19 DIAGNOSIS — K58.0 IRRITABLE BOWEL SYNDROME WITH DIARRHEA: ICD-10-CM

## 2024-08-19 DIAGNOSIS — M06.9 RHEUMATOID ARTHRITIS INVOLVING MULTIPLE SITES, UNSPECIFIED WHETHER RHEUMATOID FACTOR PRESENT (HCC): ICD-10-CM

## 2024-08-19 DIAGNOSIS — L72.11 PILAR CYSTS: Primary | ICD-10-CM

## 2024-08-19 DIAGNOSIS — E78.5 HYPERLIPIDEMIA ASSOCIATED WITH TYPE 2 DIABETES MELLITUS  (HCC): ICD-10-CM

## 2024-08-19 DIAGNOSIS — F41.8 DEPRESSION WITH ANXIETY: ICD-10-CM

## 2024-08-19 DIAGNOSIS — Z00.00 ANNUAL PHYSICAL EXAM: Primary | ICD-10-CM

## 2024-08-19 DIAGNOSIS — R20.0 NUMBNESS: ICD-10-CM

## 2024-08-19 DIAGNOSIS — D17.0 LIPOMA OF HEAD: ICD-10-CM

## 2024-08-19 DIAGNOSIS — C73 THYROID CANCER (HCC): ICD-10-CM

## 2024-08-19 DIAGNOSIS — E11.69 HYPERLIPIDEMIA ASSOCIATED WITH TYPE 2 DIABETES MELLITUS  (HCC): ICD-10-CM

## 2024-08-19 DIAGNOSIS — E11.65 TYPE 2 DIABETES MELLITUS WITH HYPERGLYCEMIA, WITHOUT LONG-TERM CURRENT USE OF INSULIN (HCC): ICD-10-CM

## 2024-08-19 DIAGNOSIS — G47.33 OSA (OBSTRUCTIVE SLEEP APNEA): ICD-10-CM

## 2024-08-19 PROBLEM — L03.031 PARONYCHIA OF GREAT TOE OF RIGHT FOOT: Status: RESOLVED | Noted: 2024-06-13 | Resolved: 2024-08-19

## 2024-08-19 PROBLEM — M25.552 ACUTE HIP PAIN, LEFT: Status: RESOLVED | Noted: 2024-06-13 | Resolved: 2024-08-19

## 2024-08-19 PROCEDURE — 99396 PREV VISIT EST AGE 40-64: CPT | Performed by: NURSE PRACTITIONER

## 2024-08-19 PROCEDURE — 99203 OFFICE O/P NEW LOW 30 MIN: CPT | Performed by: PHYSICIAN ASSISTANT

## 2024-08-19 PROCEDURE — 99214 OFFICE O/P EST MOD 30 MIN: CPT | Performed by: NURSE PRACTITIONER

## 2024-08-19 NOTE — ASSESSMENT & PLAN NOTE
Patient has been having intermittent joint pain in her hands.  Rheumatology aware.  To continue methotrexate and folic acid as managed by rheumatology.

## 2024-08-19 NOTE — ASSESSMENT & PLAN NOTE
Lab Results   Component Value Date    HGBA1C 7.2 (A) 06/13/2024   To continue Jardiance 10 mg daily.  Counseled on the importance of consuming a low-carb diet, weight loss, and engaging in daily physical activity.  To continue care with endocrinology.

## 2024-08-19 NOTE — PROGRESS NOTES
Adult Annual Physical  Name: Vanessa Torres      : 1980      MRN: [id number]  Encounter Provider: CRIS Oleary  Encounter Date: 2024   Encounter department: [facility] [address]    Assessment & Plan   1. Annual physical exam  2. Rheumatoid arthritis involving multiple sites, unspecified whether rheumatoid factor present (Formerly [facility])  Assessment & Plan:  Patient has been having intermittent joint pain in her hands.  Rheumatology aware.  To continue methotrexate and folic acid as managed by rheumatology.  3. Type 2 diabetes mellitus with hyperglycemia, without long-term current use of insulin (Formerly [facility])  Assessment & Plan:    Lab Results   Component Value Date    HGBA1C 7.2 (A) 2024   To continue Jardiance 10 mg daily.  Counseled on the importance of consuming a low-carb diet, weight loss, and engaging in daily physical activity.  To continue care with endocrinology.  4. Lipoma of head  -     Ambulatory Referral to Plastic Surgery; Future  5. Hyperlipidemia associated with type 2 diabetes mellitus  (Formerly [facility])  Assessment & Plan:    Lab Results   Component Value Date    HGBA1C 7.2 (A) 2024   To continue rosuvastatin 5 mg daily.  Counseled on the importance of consuming a heart healthy diet and engaging in daily physical activity.  6. BMI 45.0-49.9, adult (Formerly [facility])  Assessment & Plan:  Counseled on the importance of weight loss, healthy food choices, engaging in daily physical activity, and reducing BMI.   7. Numbness  Assessment & Plan:  Patient continues to experience intermittent numbness in right thigh.  Denies bowel/bladder dysfunction, back pain, or weakness to extremities.  She has an upcoming appointment for an EMG.  8. NEO (obstructive sleep apnea)  Assessment & Plan:  Uses CPAP intermittently.  Counseled on the importance of utilizing CPAP nightly and the negative health effects of untreated sleep apnea.  9. Irritable bowel syndrome with  diarrhea  Assessment & Plan:  IBS stable.  10. Thyroid cancer (HCC)  Assessment & Plan:  Patient has an upcoming appointment for thyroid ultrasound due to intermittent voice changes.  To continue care with endocrinology.  11. Depression with anxiety  Assessment & Plan:  Stable.  To continue current medications.  To continue care with psychiatrist.    Immunizations and preventive care screenings were discussed with patient today. Appropriate education was printed on patient's after visit summary.    Counseling:  Alcohol/drug use: discussed moderation in alcohol intake, the recommendations for healthy alcohol use, and avoidance of illicit drug use.  Dental Health: discussed importance of regular tooth brushing, flossing, and dental visits.  Injury prevention: discussed safety/seat belts, safety helmets, smoke detectors, carbon dioxide detectors, and smoking near bedding or upholstery.  Sexual health: discussed sexually transmitted diseases, partner selection, use of condoms, avoidance of unintended pregnancy, and contraceptive alternatives.  Exercise: the importance of regular exercise/physical activity was discussed. Recommend exercise 3-5 times per week for at least 30 minutes.          History of Present Illness     Adult Annual Physical:  Patient presents for annual physical. Kenia presents for a physical.     Diet and Physical Activity:  - Diet/Nutrition: well balanced diet and low carb diet.  - Exercise: no formal exercise.    General Health:  - Sleep: 7-8 hours of sleep on average.  - Hearing: decreased hearing left ear and normal hearing right ear.  - Vision: most recent eye exam < 1 year ago. wears reading glasses  - Dental: regular dental visits.    /GYN Health:  - Follows with GYN: yes.   - Menopause: premenopausal.   - Last menstrual cycle: 8/16/2024.     Review of Systems   Constitutional:  Positive for fatigue.   HENT:  Positive for voice change.    Eyes: Negative.    Respiratory: Negative.    "  Cardiovascular: Negative.    Gastrointestinal: Negative.    Endocrine: Negative.    Genitourinary: Negative.    Musculoskeletal: Negative.    Skin: Negative.    Allergic/Immunologic: Negative.    Neurological:  Positive for numbness (intermittently in right thigh).   Hematological: Negative.    Psychiatric/Behavioral:  Positive for sleep disturbance.      Current Outpatient Medications on File Prior to Visit   Medication Sig Dispense Refill    albuterol (PROVENTIL HFA,VENTOLIN HFA) 90 mcg/act inhaler Inhale 2 puffs 4 (four) times a day 8 g 0    ARIPiprazole (ABILIFY) 5 mg tablet Take 5 mg by mouth daily      ascorbic Acid (VITAMIN C) 500 MG CPCR Take 500 mg by mouth daily      Cholecalciferol (VITAMIN D-3) 5000 units TABS Take 5,000 Units by mouth      Empagliflozin (Jardiance) 10 MG TABS tablet Take 1 tablet (10 mg total) by mouth every morning 90 tablet 1    fluticasone (FLONASE) 50 mcg/act nasal spray 1 spray into each nostril daily 9.9 mL 0    folic acid (FOLVITE) 1 mg tablet Take orally once a day 90 tablet 1    levothyroxine 112 mcg tablet Take 224 mcg by mouth daily      methotrexate 2.5 mg tablet Take 5 tablets by mouth once a week      nystatin (MYCOSTATIN) powder Apply topically 3 (three) times a day 30 g 1    pantoprazole (PROTONIX) 40 mg tablet TAKE ONE TABLET BY MOUTH EVERY DAY 30 tablet 5    prazosin (MINIPRESS) 1 mg capsule Take 2 mg by mouth daily at bedtime      rosuvastatin (CRESTOR) 5 mg tablet Take 1 tablet (5 mg total) by mouth daily 90 tablet 1    sertraline (ZOLOFT) 100 mg tablet Take 100 mg by mouth daily      traZODone (DESYREL) 50 mg tablet Take 1 tablet by mouth daily as needed      [DISCONTINUED] busPIRone (BUSPAR) 5 mg tablet Take 1 tablet (5 mg total) by mouth 2 (two) times a day       No current facility-administered medications on file prior to visit.        Objective     /76 (BP Location: Left arm, Patient Position: Sitting)   Pulse 88   Temp 97.9 °F (36.6 °C)   Ht 5' 5\" " (1.651 m)   Wt 125 kg (275 lb 8 oz)   SpO2 98%   BMI 45.85 kg/m²     Physical Exam  Vitals and nursing note reviewed.   Constitutional:       General: She is not in acute distress.     Appearance: Normal appearance. She is well-developed. She is not ill-appearing, toxic-appearing or diaphoretic.   HENT:      Head: Normocephalic and atraumatic.      Right Ear: External ear normal. There is impacted cerumen.      Left Ear: External ear normal. There is impacted cerumen.      Nose: Nose normal.      Mouth/Throat:      Mouth: Mucous membranes are moist.      Pharynx: Oropharynx is clear. No oropharyngeal exudate.   Eyes:      Conjunctiva/sclera: Conjunctivae normal.      Pupils: Pupils are equal, round, and reactive to light.   Neck:      Thyroid: No thyromegaly.   Cardiovascular:      Rate and Rhythm: Normal rate and regular rhythm.      Pulses: Normal pulses.      Heart sounds: Normal heart sounds. No murmur heard.  Pulmonary:      Effort: Pulmonary effort is normal. No respiratory distress.      Breath sounds: Normal breath sounds. No stridor. No wheezing or rales.   Chest:      Chest wall: No tenderness.   Abdominal:      General: Bowel sounds are normal. There is no distension.      Palpations: Abdomen is soft. There is no mass.      Tenderness: There is no abdominal tenderness. There is no guarding or rebound.      Hernia: No hernia is present.   Musculoskeletal:         General: Normal range of motion.      Cervical back: Normal range of motion and neck supple.   Lymphadenopathy:      Cervical: No cervical adenopathy.   Skin:     General: Skin is warm and dry.      Capillary Refill: Capillary refill takes less than 2 seconds.   Neurological:      General: No focal deficit present.      Mental Status: She is alert and oriented to person, place, and time.      Cranial Nerves: No cranial nerve deficit.      Gait: Gait normal.   Psychiatric:         Mood and Affect: Mood normal.         Behavior: Behavior normal.          Thought Content: Thought content normal.         Judgment: Judgment normal.       Administrative Statements   I have spent a total time of 35 minutes in caring for this patient on the day of the visit/encounter including Diagnostic results, Prognosis, Risks and benefits of tx options, Instructions for management, Patient and family education, Importance of tx compliance, Risk factor reductions, Impressions, Counseling / Coordination of care, Documenting in the medical record, Reviewing / ordering tests, medicine, procedures  , and Obtaining or reviewing history    .

## 2024-08-19 NOTE — ASSESSMENT & PLAN NOTE
Patient has an upcoming appointment for thyroid ultrasound due to intermittent voice changes.  To continue care with endocrinology.

## 2024-08-19 NOTE — PROGRESS NOTES
Plastic Surgery Consultation Note:     HPI: The patient is a pleasant 43-year-old female presenting today for evaluation of multiple scalp masses.  The patient states that she has several masses, varying in size, spread diffusely around her scalp.  She states that most of the masses are getting larger.  She denies any significant pain, drainage, bleeding, or wounds.  She is interested in having these masses removed.  The patient's past medical history is significant for obesity, thyroid cancer, NEO, rheumatoid arthritis, depression, anxiety, diabetes, and IBS.  The patient does not take any blood thinners or steroids.  She is a former smoker.  She denies any medication allergies and denies a history of blood clots.    Past Medical History:   Diagnosis Date    Arthritis     RA    Cancer (HCC)     Colon polyp     CPAP (continuous positive airway pressure) dependence     NEO (obstructive sleep apnea)     Sleep apnea     Thyroid cancer (HCC)     Varicella      Patient Active Problem List   Diagnosis    Gastroesophageal reflux disease without esophagitis    BMI 45.0-49.9, adult (HCC)    Post-surgical hypothyroidism    Rheumatoid arthritis involving multiple sites (HCC)    Thyroid cancer (HCC)    Depression with anxiety    Post traumatic stress disorder (PTSD)    Menorrhagia with regular cycle    NEO (obstructive sleep apnea)    Annual physical exam    Irritable bowel syndrome with diarrhea    Abnormal stress ECG with treadmill    Type 2 diabetes mellitus with hyperglycemia, without long-term current use of insulin (HCC)    Panic disorder without agoraphobia with severe panic attacks    Numbness    Hyperlipidemia associated with type 2 diabetes mellitus  (HCC)       Current Outpatient Medications:     albuterol (PROVENTIL HFA,VENTOLIN HFA) 90 mcg/act inhaler, Inhale 2 puffs 4 (four) times a day, Disp: 8 g, Rfl: 0    ARIPiprazole (ABILIFY) 5 mg tablet, Take 5 mg by mouth daily, Disp: , Rfl:     ascorbic Acid (VITAMIN C)  500 MG CPCR, Take 500 mg by mouth daily, Disp: , Rfl:     Cholecalciferol (VITAMIN D-3) 5000 units TABS, Take 5,000 Units by mouth, Disp: , Rfl:     Empagliflozin (Jardiance) 10 MG TABS tablet, Take 1 tablet (10 mg total) by mouth every morning, Disp: 90 tablet, Rfl: 1    fluticasone (FLONASE) 50 mcg/act nasal spray, 1 spray into each nostril daily, Disp: 9.9 mL, Rfl: 0    folic acid (FOLVITE) 1 mg tablet, Take orally once a day, Disp: 90 tablet, Rfl: 1    levothyroxine 112 mcg tablet, Take 224 mcg by mouth daily, Disp: , Rfl:     methotrexate 2.5 mg tablet, Take 5 tablets by mouth once a week, Disp: , Rfl:     nystatin (MYCOSTATIN) powder, Apply topically 3 (three) times a day, Disp: 30 g, Rfl: 1    pantoprazole (PROTONIX) 40 mg tablet, TAKE ONE TABLET BY MOUTH EVERY DAY, Disp: 30 tablet, Rfl: 5    prazosin (MINIPRESS) 1 mg capsule, Take 2 mg by mouth daily at bedtime, Disp: , Rfl:     rosuvastatin (CRESTOR) 5 mg tablet, Take 1 tablet (5 mg total) by mouth daily, Disp: 90 tablet, Rfl: 1    sertraline (ZOLOFT) 100 mg tablet, Take 100 mg by mouth daily, Disp: , Rfl:     traZODone (DESYREL) 50 mg tablet, Take 1 tablet by mouth daily as needed, Disp: , Rfl:     Past Surgical History:   Procedure Laterality Date    CHOLECYSTECTOMY      COLONOSCOPY      KNEE SURGERY Left     ACL repair     KNEE SURGERY      SKIN GRAFT Right     thumb    THYROIDECTOMY      US GUIDED THYROID BIOPSY  2018     Social History     Tobacco Use    Smoking status: Former     Current packs/day: 0.00     Average packs/day: 0.5 packs/day for 22.6 years (11.3 ttl pk-yrs)     Types: Cigarettes     Start date:      Quit date: 2015     Years since quittin.0    Smokeless tobacco: Never    Tobacco comments:     quit 5 years    Substance Use Topics    Alcohol use: Not Currently     Comment: socially     Objective:  Vitals:    24 0946   BP: 118/74   Pulse: 95   Temp: 97.9 °F (36.6 °C)   SpO2: 98%     Physical Exam:  General: NAD,  alert, cooperative    Skin: Multiple well-defined subcutaneous masses of the scalp.  Masses are soft and mobile.  There is no evidence of drainage, surrounding erythema, wounds, or signs of infection.  Masses range in size from 5 mm to 1.5cm. Consistent with pilar cysts.     Assessment: 43-year-old female with multiple masses of the scalp    Plan:  Will discuss patient with Dr. Gibbs.  Advised patient that due to the number of scalp masses that she has, this procedure should likely be done in the operating room.  Discussed the procedure in detail with the patient including the risks of bleeding, infection, scarring, recurrence, need for additional procedures.  Discussed with the patient that she would have sutures/staples in her scalp for about 2 weeks and specimens will be sent to pathology for definitive testing.  Discussed the use of prophylactic antibiotics as well.  The patient understands and agrees with the plan of care, no additional questions at this time.  Will submit for insurance authorization and schedule patient accordingly.    I have spent a total time of 30 minutes in caring for this patient on the day of the visit/encounter including Risks and benefits of tx options, Instructions for management, Patient and family education, Impressions, Counseling / Coordination of care, Documenting in the medical record, Obtaining or reviewing history  , and Communicating with other healthcare professionals .       Jorge Martin PA-C  Plastic & Reconstructive Surgery

## 2024-08-19 NOTE — ASSESSMENT & PLAN NOTE
Patient continues to experience intermittent numbness in right thigh.  Denies bowel/bladder dysfunction, back pain, or weakness to extremities.  She has an upcoming appointment for an EMG.

## 2024-08-19 NOTE — ASSESSMENT & PLAN NOTE
Lab Results   Component Value Date    HGBA1C 7.2 (A) 06/13/2024   To continue rosuvastatin 5 mg daily.  Counseled on the importance of consuming a heart healthy diet and engaging in daily physical activity.

## 2024-08-19 NOTE — ASSESSMENT & PLAN NOTE
Uses CPAP intermittently.  Counseled on the importance of utilizing CPAP nightly and the negative health effects of untreated sleep apnea.

## 2024-08-19 NOTE — TELEPHONE ENCOUNTER
Dm foot exam form faxed to Dr Gilliam office, patients charts dm foot exam was complete 6/24 by podiatry.

## 2024-08-27 ENCOUNTER — TELEPHONE (OUTPATIENT)
Age: 44
End: 2024-08-27

## 2024-08-27 NOTE — TELEPHONE ENCOUNTER
Patient had been seen 8/19 by Jorge for Pilar cysts.  In discussions with Jorge for next steps she was informed that she would be contacted to schedule and set up surgery.    Patient has not heard anything and is wondering the status as Jorge was to speak with Dr Gibbs.

## 2024-08-30 DIAGNOSIS — E78.5 ELEVATED LIPIDS: ICD-10-CM

## 2024-08-30 DIAGNOSIS — E11.65 TYPE 2 DIABETES MELLITUS WITH HYPERGLYCEMIA, WITHOUT LONG-TERM CURRENT USE OF INSULIN (HCC): ICD-10-CM

## 2024-09-01 RX ORDER — ROSUVASTATIN CALCIUM 5 MG/1
5 TABLET, COATED ORAL DAILY
Qty: 90 TABLET | Refills: 1 | Status: SHIPPED | OUTPATIENT
Start: 2024-09-01

## 2024-09-16 ENCOUNTER — PROCEDURE VISIT (OUTPATIENT)
Dept: NEUROLOGY | Facility: CLINIC | Age: 44
End: 2024-09-16
Payer: COMMERCIAL

## 2024-09-16 DIAGNOSIS — R20.0 NUMBNESS: Primary | ICD-10-CM

## 2024-09-16 DIAGNOSIS — R20.0 NUMBNESS: ICD-10-CM

## 2024-09-16 PROCEDURE — 95886 MUSC TEST DONE W/N TEST COMP: CPT | Performed by: PHYSICAL MEDICINE & REHABILITATION

## 2024-09-16 PROCEDURE — 95909 NRV CNDJ TST 5-6 STUDIES: CPT | Performed by: PHYSICAL MEDICINE & REHABILITATION

## 2024-09-17 ENCOUNTER — PREP FOR PROCEDURE (OUTPATIENT)
Age: 44
End: 2024-09-17

## 2024-09-17 ENCOUNTER — TELEPHONE (OUTPATIENT)
Age: 44
End: 2024-09-17

## 2024-09-17 DIAGNOSIS — L72.11 PILAR CYSTS: Primary | ICD-10-CM

## 2024-09-18 ENCOUNTER — TELEPHONE (OUTPATIENT)
Dept: FAMILY MEDICINE CLINIC | Facility: CLINIC | Age: 44
End: 2024-09-18

## 2024-09-18 NOTE — TELEPHONE ENCOUNTER
Left voicemail to schedule patient for a pre op appointment. We received a fax from [facility] requesting a pre op appointment for patients surgery on 11/5/2024.    The form is in Luna's folder,     Will call patient again to try to get her scheduled.

## 2024-09-19 ENCOUNTER — NURSE TRIAGE (OUTPATIENT)
Age: 44
End: 2024-09-19

## 2024-09-19 NOTE — TELEPHONE ENCOUNTER
"Pt reports a fever of (102.0),and using tylenol. Pt reports cough w/phlegm( green) and chills.   Pt tested for covid and neg at this time. Tylenol hasn't brought her fever down.     Per protocol, triage nurse attempted to make an appt for pt and called the office as well to see if there were any cancellations.     Triage nurse instructed pt to go to Urgent care, since office couldn't fit pt in.    PCP please follow up with pt with her immunocompromised history.  Thank you    Reason for Disposition   Fever > 100.0 F (37.8 C) and diabetes mellitus or a weak immune system (e.g., HIV positive, chemotherapy, splenectomy)    Answer Assessment - Initial Assessment Questions  1. TEMPERATURE: \"What is the most recent temperature?\"  \"How was it measured?\"       Under tongue  2. ONSET: \"When did the fever start?\"       9/18  3. SYMPTOMS: \"Do you have any other symptoms besides the fever?\"  (e.g., colds, headache, sore throat, earache, cough, rash, diarrhea, vomiting, abdominal pain)      Sore throat, headache, some abdominal pain due to gas pt states  4. CAUSE: If there are no symptoms, ask: \"What do you think is causing the fever?\"       unsure  5. CONTACTS: \"Does anyone else in the family have an infection?\"      Unsure little kids all the time  6. TREATMENT: \"What have you done so far to treat this fever?\" (e.g., medications)      Tylenol   7. IMMUNOCOMPROMISE: \"Do you have of the following: diabetes, HIV positive, splenectomy, cancer chemotherapy, chronic steroid treatment, transplant patient, etc.\"      Thyroid cancer and diabetes  8. PREGNANCY: \"Is there any chance you are pregnant?\" \"When was your last menstrual period?\"      Denies and last week was her last cycle  9. TRAVEL: \"Have you traveled out of the country in the last month?\" (e.g., travel history, exposures)      denies    Protocols used: Fever-ADULT-[place]    "

## 2024-09-19 NOTE — TELEPHONE ENCOUNTER
Penny do you think we can fit her on somewhere with a 40 mint? Or have her do the urgent care, she might have tested to soon for covid

## 2024-09-26 DIAGNOSIS — R20.0 NUMBNESS: Primary | ICD-10-CM

## 2024-10-17 ENCOUNTER — TELEPHONE (OUTPATIENT)
Dept: PLASTIC SURGERY | Facility: CLINIC | Age: 44
End: 2024-10-17

## 2024-10-18 NOTE — TELEPHONE ENCOUNTER
Pt calling back to confirm new surgery date works for her, also confirmed 11/4 pre-op appt with Jorge

## 2024-11-04 ENCOUNTER — OFFICE VISIT (OUTPATIENT)
Age: 44
End: 2024-11-04

## 2024-11-04 VITALS
HEART RATE: 85 BPM | BODY MASS INDEX: 43.16 KG/M2 | SYSTOLIC BLOOD PRESSURE: 118 MMHG | HEIGHT: 67 IN | WEIGHT: 275 LBS | TEMPERATURE: 98.3 F | OXYGEN SATURATION: 98 % | DIASTOLIC BLOOD PRESSURE: 82 MMHG

## 2024-11-04 DIAGNOSIS — Z01.818 PREOP EXAMINATION: ICD-10-CM

## 2024-11-04 DIAGNOSIS — L72.11 PILAR CYSTS: Primary | ICD-10-CM

## 2024-11-04 PROCEDURE — PREOP: Performed by: PHYSICIAN ASSISTANT

## 2024-11-04 NOTE — PROGRESS NOTES
Plastic Surgery Consultation Note:     HPI: The patient is a pleasant 43-year-old female presenting today for preoperative history and physical for excision of multiple pilar cysts of the scalp on 12/10/2024. The patient states that she has several masses, varying in size, spread diffusely around her scalp.  She states that most of the masses are actively getting larger.  She denies any significant pain, drainage, bleeding, or wounds.  She is interested in having these masses removed.  The patient's past medical history is significant for obesity, thyroid cancer, NEO, rheumatoid arthritis, depression, anxiety, diabetes, and IBS.  The patient does not take any blood thinners or steroids.  She is a former smoker.  She denies any medication allergies and denies a history of blood clots.    Past Medical History:   Diagnosis Date    Arthritis     RA    Cancer (HCC)     Colon polyp     CPAP (continuous positive airway pressure) dependence     NEO (obstructive sleep apnea)     Sleep apnea     Thyroid cancer (HCC)     Varicella      Patient Active Problem List   Diagnosis    Gastroesophageal reflux disease without esophagitis    BMI 45.0-49.9, adult (HCC)    Post-surgical hypothyroidism    Rheumatoid arthritis involving multiple sites (HCC)    Thyroid cancer (HCC)    Depression with anxiety    Post traumatic stress disorder (PTSD)    Menorrhagia with regular cycle    NEO (obstructive sleep apnea)    Annual physical exam    Irritable bowel syndrome with diarrhea    Abnormal stress ECG with treadmill    Type 2 diabetes mellitus with hyperglycemia, without long-term current use of insulin (HCC)    Panic disorder without agoraphobia with severe panic attacks    Numbness    Hyperlipidemia associated with type 2 diabetes mellitus  (HCC)       Current Outpatient Medications:     albuterol (PROVENTIL HFA,VENTOLIN HFA) 90 mcg/act inhaler, Inhale 2 puffs 4 (four) times a day, Disp: 8 g, Rfl: 0    ARIPiprazole (ABILIFY) 5 mg tablet,  Take 5 mg by mouth daily, Disp: , Rfl:     ascorbic Acid (VITAMIN C) 500 MG CPCR, Take 500 mg by mouth daily, Disp: , Rfl:     Cholecalciferol (VITAMIN D-3) 5000 units TABS, Take 5,000 Units by mouth, Disp: , Rfl:     Empagliflozin (Jardiance) 10 MG TABS tablet, Take 1 tablet (10 mg total) by mouth every morning, Disp: 90 tablet, Rfl: 1    folic acid (FOLVITE) 1 mg tablet, Take orally once a day, Disp: 90 tablet, Rfl: 1    levothyroxine 112 mcg tablet, Take 224 mcg by mouth daily, Disp: , Rfl:     methotrexate 2.5 mg tablet, Take 5 tablets by mouth once a week, Disp: , Rfl:     pantoprazole (PROTONIX) 40 mg tablet, TAKE ONE TABLET BY MOUTH EVERY DAY, Disp: 30 tablet, Rfl: 5    prazosin (MINIPRESS) 1 mg capsule, Take 2 mg by mouth daily at bedtime, Disp: , Rfl:     rosuvastatin (CRESTOR) 5 mg tablet, TAKE ONE TABLET BY MOUTH EVERY DAY, Disp: 90 tablet, Rfl: 1    sertraline (ZOLOFT) 100 mg tablet, Take 100 mg by mouth daily, Disp: , Rfl:     traZODone (DESYREL) 50 mg tablet, Take 1 tablet by mouth daily as needed, Disp: , Rfl:     fluticasone (FLONASE) 50 mcg/act nasal spray, 1 spray into each nostril daily (Patient not taking: Reported on 2024), Disp: 9.9 mL, Rfl: 0    nystatin (MYCOSTATIN) powder, Apply topically 3 (three) times a day (Patient not taking: Reported on 2024), Disp: 30 g, Rfl: 1    Past Surgical History:   Procedure Laterality Date    CHOLECYSTECTOMY      COLONOSCOPY      KNEE SURGERY Left     ACL repair     KNEE SURGERY      SKIN GRAFT Right     thumb    THYROIDECTOMY      US GUIDED THYROID BIOPSY  2018     Social History     Tobacco Use    Smoking status: Former     Current packs/day: 0.00     Average packs/day: 0.5 packs/day for 22.6 years (11.3 ttl pk-yrs)     Types: Cigarettes     Start date:      Quit date: 2015     Years since quittin.2    Smokeless tobacco: Never    Tobacco comments:     quit 5 years    Substance Use Topics    Alcohol use: Not Currently     Comment:  socially     Objective:  Vitals:    11/04/24 1331   BP: 118/82   Pulse: 85   Temp: 98.3 °F (36.8 °C)   SpO2: 98%     Physical Exam:  General: NAD, alert, cooperative  Heart: Regular rate  Lungs: Normal respiratory effort  Skin: Multiple (about 10) well-defined subcutaneous masses of the scalp.  Masses are soft and mobile.  There is no evidence of drainage, surrounding erythema, wounds, or signs of infection.  Masses range in size from 5 mm to 1.5cm. Consistent with pilar cysts.     Assessment: 43-year-old female with multiple masses of the scalp    Plan:  Labs reviewed  Going for PCP clearance 11/21/24    Advised patient that due to the number of scalp masses that she has, this procedure should likely be done in the operating room.  Discussed the procedure in detail with the patient including the risks of bleeding, infection, scarring, recurrence, need for additional procedures.  Discussed with the patient that she would have sutures/staples in her scalp for about 2 weeks and specimens will be sent to pathology for definitive testing.  Discussed the use of prophylactic antibiotics as well.  The patient understands and agrees with the plan of care, no additional questions at this time.     Today I discussed the surgery in detail with the patient regarding the expected length of surgery, the expected hospital stay, and the expected recovery time. We discussed the potential risks and complications such as bleeding, infection, scarring, hematoma, seroma, asymmetry, contour deformity, skin necrosis, damage to surrounding structures, wound healing complications, and need for additional procedures. Postoperative activity restrictions and medications were discussed. The patient understands and agrees to the plan of care and does not have any additional questions at this time. Informed consent was obtained today for the upcoming surgery.        Jorge Martin PA-C  Plastic & Reconstructive Surgery

## 2024-11-14 ENCOUNTER — RA CDI HCC (OUTPATIENT)
Dept: OTHER | Facility: HOSPITAL | Age: 44
End: 2024-11-14

## 2024-11-14 ENCOUNTER — OFFICE VISIT (OUTPATIENT)
Dept: NEUROLOGY | Facility: CLINIC | Age: 44
End: 2024-11-14
Payer: COMMERCIAL

## 2024-11-14 VITALS
HEIGHT: 67 IN | WEIGHT: 275 LBS | OXYGEN SATURATION: 98 % | SYSTOLIC BLOOD PRESSURE: 110 MMHG | TEMPERATURE: 97.2 F | BODY MASS INDEX: 43.16 KG/M2 | HEART RATE: 86 BPM | DIASTOLIC BLOOD PRESSURE: 70 MMHG

## 2024-11-14 DIAGNOSIS — R20.0 NUMBNESS: ICD-10-CM

## 2024-11-14 DIAGNOSIS — G57.11 MERALGIA PARESTHETICA OF RIGHT SIDE: Primary | ICD-10-CM

## 2024-11-14 PROCEDURE — 99204 OFFICE O/P NEW MOD 45 MIN: CPT | Performed by: NURSE PRACTITIONER

## 2024-11-14 NOTE — PATIENT INSTRUCTIONS
[email or web address]    Do exercises and continue with weight loss and hopefully symptoms will subside over time  Let us know if any increase pain or other new symptoms  Follow up as needed.

## 2024-11-14 NOTE — PROGRESS NOTES
Neurology Ambulatory Visit  Name: Vanessa Torres       : 1980       MRN: [id number]   Encounter Provider: Sha Hoang MA   Encounter Date: 2024  Encounter department: [facility]    Assessment and Plan  1. Meralgia paresthetica of right side  2. Numbness  -     Ambulatory Referral to Neurology    Patient Instructions/Plan:  Do exercises and continue with weight loss and hopefully symptoms will subside over time  Let us know if any increase pain or other new symptoms  Follow up as needed.    She will Return if symptoms worsen or fail to improve.    History of Present Illness     HPI   Vanessa Torres is a 43 y.o. female with past medical history of obesity, depression/anxiety stable on zoloft/abilify, sleep disorder stable on prazosin/trazodone, RA stable on methotrexate, hyperlipidemia, NEO on cpap, thyroid cancer s/p thyroidectomy, , GERD who presents for 6 month duration of right lateral thigh intermittent numbness without back pain or weakness. She has been working on weight loss. She denies pain to the area when it happens. She states it happens when she stands for more than 10 minutes. 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 She had EMG testing that showed mild neuropathy but this does not correlate with her symptom of concern and specific lateral femoral cutaneous nerve testing was not done. She does not have any more concerning systemic symptoms such as night sweats/fever/abdominal pain/ hip pain to suggest other cause of her thigh numbness so we decided not to do further testing but to do home exercises (time limits her ability to attend formal PT) and follow up if symptoms worsen; discussed symptoms to look out for.    EMG 2024:  There is electrophysiologic evidence of a: 1. Mild sensory neuropathy with axonal changes as evidenced by the  abnormal sensory nerve conduction studies. 2. There is no evidence of a lumbosacral radiculopathy.    Review of Systems   Constitutional:  Positive for fatigue.   HENT: Negative.     Eyes: Negative.    Respiratory: Negative.     Cardiovascular: Negative.    Gastrointestinal:  Positive for nausea.   Endocrine: Negative.    Genitourinary: Negative.    Musculoskeletal: Negative.    Skin: Negative.    Allergic/Immunologic: Negative.    Neurological:  Positive for numbness (and tingling of right leg).   Hematological: Negative.    Psychiatric/Behavioral: Negative.     ROS was reviewed and updated as appropriate    Current Outpatient Medications on File Prior to Visit   Medication Sig Dispense Refill    albuterol (PROVENTIL HFA,VENTOLIN HFA) 90 mcg/act inhaler Inhale 2 puffs 4 (four) times a day 8 g 0    ARIPiprazole (ABILIFY) 5 mg tablet Take 5 mg by mouth daily      ascorbic Acid (VITAMIN C) 500 MG CPCR Take 500 mg by mouth daily      Cholecalciferol (VITAMIN D-3) 5000 units TABS Take 5,000 Units by mouth      Empagliflozin (Jardiance) 10 MG TABS tablet Take 1 tablet (10 mg total) by mouth every morning 90 tablet 1    folic acid (FOLVITE) 1 mg tablet Take orally once a day 90 tablet 1    levothyroxine 112 mcg tablet Take 224 mcg by mouth daily      methotrexate 2.5 mg tablet Take 5 tablets by mouth once a week      pantoprazole (PROTONIX) 40 mg tablet TAKE ONE TABLET BY MOUTH EVERY DAY 30 tablet 5    prazosin (MINIPRESS) 1 mg capsule Take 2 mg by mouth daily at bedtime      rosuvastatin (CRESTOR) 5 mg tablet TAKE ONE TABLET BY MOUTH EVERY DAY 90 tablet 1    sertraline (ZOLOFT) 100 mg tablet Take 100 mg by mouth daily      traZODone (DESYREL) 50 mg tablet Take 1 tablet by mouth daily as needed      fluticasone (FLONASE) 50 mcg/act nasal spray 1 spray into each nostril daily (Patient not taking: Reported on 11/4/2024) 9.9 mL 0    nystatin (MYCOSTATIN) powder Apply topically 3 (three) times a day (Patient not taking:  "Reported on 2024) 30 g 1     No current facility-administered medications on file prior to visit.      Social History     Tobacco Use    Smoking status: Former     Current packs/day: 0.00     Average packs/day: 0.5 packs/day for 22.6 years (11.3 ttl pk-yrs)     Types: Cigarettes     Start date:      Quit date: 2015     Years since quittin.3    Smokeless tobacco: Never    Tobacco comments:     quit 5 years    Vaping Use    Vaping status: Never Used   Substance and Sexual Activity    Alcohol use: Not Currently     Comment: socially    Drug use: Yes     Types: Other     Comment: edibles occ    Sexual activity: Yes     Partners: Female     Birth control/protection: None       Objective     /70 (BP Location: Right arm, Patient Position: Sitting, Cuff Size: Standard)   Pulse 86   Temp (!) 97.2 °F (36.2 °C) (Temporal)   Ht 5' 7\" (1.702 m)   Wt 125 kg (275 lb)   SpO2 98%   BMI 43.07 kg/m²    Physical Exam  Vitals reviewed.   Constitutional:       General: She is not in acute distress.  HENT:      Head: Normocephalic and atraumatic.      Right Ear: External ear normal.      Left Ear: External ear normal.      Nose: Nose normal.      Mouth/Throat:      Mouth: Mucous membranes are moist.   Eyes:      General: Lids are normal.         Right eye: No discharge.         Left eye: No discharge.      Extraocular Movements: Extraocular movements intact.      Pupils: Pupils are equal, round, and reactive to light.   Cardiovascular:      Rate and Rhythm: Normal rate and regular rhythm.      Pulses: Normal pulses.      Heart sounds: Normal heart sounds.   Pulmonary:      Effort: Pulmonary effort is normal.      Breath sounds: Normal breath sounds.   Abdominal:      General: There is no distension.      Tenderness: There is no abdominal tenderness.   Musculoskeletal:      Cervical back: Normal range of motion. No rigidity.      Right lower leg: No edema.      Left lower leg: No edema.   Skin:     General: " Skin is warm.      Capillary Refill: Capillary refill takes less than 2 seconds.      Findings: No rash.   Neurological:      Mental Status: She is alert. Mental status is at baseline.      Motor: Motor strength is normal.     Coordination: Romberg sign negative.      Deep Tendon Reflexes:      Reflex Scores:       Brachioradialis reflexes are 2+ on the right side and 2+ on the left side.       Patellar reflexes are 2+ on the right side and 2+ on the left side.       Achilles reflexes are 1+ on the right side and 1+ on the left side.  Psychiatric:         Mood and Affect: Mood normal.         Speech: Speech normal.       Neurological Exam  Mental Status  Alert. Oriented to person, place and time. Speech is normal. Language is fluent with no aphasia.    Cranial Nerves  CN II: Visual acuity is normal. Visual fields full to confrontation.  CN III, IV, VI: Extraocular movements intact bilaterally. Normal lids and orbits bilaterally. Pupils equal round and reactive to light bilaterally.  CN V: Facial sensation is normal.  CN VII: Full and symmetric facial movement.  CN VIII: Hearing is normal.  CN IX, X: Palate elevates symmetrically. Normal gag reflex.  CN XI: Shoulder shrug strength is normal.  CN XII: Tongue midline without atrophy or fasciculations.    Motor  Normal muscle bulk throughout. Strength is 5/5 throughout all four extremities.    Sensory  Right: Loss of sensation in the lateral cutaneous thigh nerve distribution.    Reflexes                                            Right                      Left  Brachioradialis                    2+                         2+  Patellar                                2+                         2+  Achilles                                1+                         1+    Coordination  Right: Rapid alternating movement normal.Left: Rapid alternating movement normal.    Gait  Casual gait is normal including stance, stride, and arm swing.Normal toe walking. Normal tandem  gait. Romberg is absent. Able to rise from chair without using arms.

## 2024-11-18 RX ORDER — METHOTREXATE 2.5 MG/1
12.5 TABLET ORAL WEEKLY
Qty: 20 TABLET | Refills: 0 | OUTPATIENT
Start: 2024-11-18

## 2024-11-18 NOTE — TELEPHONE ENCOUNTER
Reason for call:   [x] Refill   [] Prior Auth  [] Other:     Office:   [x] PCP/Provider -   [] Specialty/Provider -     Medication: methotrexate 2.5 mg tablet     Dose/Frequency: Take 5 tablets once a week    Quantity: 20    [facility] [place]     Does the patient have enough for 3 days?   [] Yes   [x] No - Send as HP to POD

## 2024-11-21 ENCOUNTER — CONSULT (OUTPATIENT)
Dept: FAMILY MEDICINE CLINIC | Facility: CLINIC | Age: 44
End: 2024-11-21
Payer: COMMERCIAL

## 2024-11-21 VITALS
BODY MASS INDEX: 43.51 KG/M2 | SYSTOLIC BLOOD PRESSURE: 122 MMHG | WEIGHT: 277.2 LBS | OXYGEN SATURATION: 98 % | DIASTOLIC BLOOD PRESSURE: 84 MMHG | TEMPERATURE: 97.7 F | HEIGHT: 67 IN | HEART RATE: 103 BPM

## 2024-11-21 DIAGNOSIS — K58.0 IRRITABLE BOWEL SYNDROME WITH DIARRHEA: ICD-10-CM

## 2024-11-21 DIAGNOSIS — C73 THYROID CANCER (HCC): ICD-10-CM

## 2024-11-21 DIAGNOSIS — Z23 ENCOUNTER FOR IMMUNIZATION: ICD-10-CM

## 2024-11-21 DIAGNOSIS — M06.9 RHEUMATOID ARTHRITIS INVOLVING MULTIPLE SITES, UNSPECIFIED WHETHER RHEUMATOID FACTOR PRESENT (HCC): ICD-10-CM

## 2024-11-21 DIAGNOSIS — E11.65 TYPE 2 DIABETES MELLITUS WITH HYPERGLYCEMIA, WITHOUT LONG-TERM CURRENT USE OF INSULIN (HCC): ICD-10-CM

## 2024-11-21 DIAGNOSIS — Z01.818 PRE-OP EXAMINATION: Primary | ICD-10-CM

## 2024-11-21 DIAGNOSIS — J45.21 MILD INTERMITTENT ASTHMA WITH ACUTE EXACERBATION: ICD-10-CM

## 2024-11-21 DIAGNOSIS — Z12.31 ENCOUNTER FOR SCREENING MAMMOGRAM FOR BREAST CANCER: ICD-10-CM

## 2024-11-21 DIAGNOSIS — G47.33 OSA (OBSTRUCTIVE SLEEP APNEA): ICD-10-CM

## 2024-11-21 DIAGNOSIS — F32.2 SEVERE MAJOR DEPRESSIVE DISORDER (HCC): ICD-10-CM

## 2024-11-21 DIAGNOSIS — H61.23 BILATERAL IMPACTED CERUMEN: ICD-10-CM

## 2024-11-21 PROCEDURE — 90471 IMMUNIZATION ADMIN: CPT | Performed by: NURSE PRACTITIONER

## 2024-11-21 PROCEDURE — 93000 ELECTROCARDIOGRAM COMPLETE: CPT | Performed by: NURSE PRACTITIONER

## 2024-11-21 PROCEDURE — 99214 OFFICE O/P EST MOD 30 MIN: CPT | Performed by: NURSE PRACTITIONER

## 2024-11-21 PROCEDURE — 90662 IIV NO PRSV INCREASED AG IM: CPT | Performed by: NURSE PRACTITIONER

## 2024-11-21 NOTE — ASSESSMENT & PLAN NOTE
Stable.  To continue current medications.  To continue care with psychiatrist and counselor.

## 2024-11-21 NOTE — PROGRESS NOTES
Pre-operative Clearance  Name: Vanessa Torres      : 1980      MRN: [id number]  Encounter Provider: CRIS [place]  Encounter Date: 2024   Encounter department: [facility] [address] [place] [address]    Assessment & Plan  Pre-op examination  Lab work and EKG reviewed.  Patient cleared for surgery.  Orders:    POCT ECG    Severe major depressive disorder (HCC)  Stable.  To continue current medications.  To continue care with psychiatrist and counselor.         Mild intermittent asthma with acute exacerbation  Stable.  To continue albuterol as needed.       NEO (obstructive sleep apnea)  Stable.  To continue nightly use of CPAP.       Type 2 diabetes mellitus with hyperglycemia, without long-term current use of insulin (HCC)    Lab Results   Component Value Date    HGBA1C 7.2 (A) 2024   Controlled.  Counseled on directions of diabetic medicines prior to surgery.         Irritable bowel syndrome with diarrhea  Stable.  Patient manages her IBS with her diet.       Bilateral impacted cerumen  Presence of cerumen impaction on exam.  To begin Debrox x 4 days and then return for ear lavage.  Orders:    carbamide peroxide (DEBROX) 6.5 % otic solution; Administer 5 drops into both ears in the morning for 4 days    Encounter for screening mammogram for breast cancer    Orders:    Mammo screening bilateral w 3d and cad; Future    Thyroid cancer (HCC)  Stable.  To continue care with endocrinology.       Rheumatoid arthritis involving multiple sites, unspecified whether rheumatoid factor present (HCC)  Stable.  To continue current medications.  To continue care with rheumatology.       Encounter for immunization    Orders:    influenza vaccine, high-dose, PF 0.5 mL (Fluzone High Dose)    Pre-operative Clearance:     Clearance:  Patient is medically optimized (CLEARED) for proposed surgery without any additional cardiac testing.      Medication Instructions:   - Alpha Adrenergic  Antagonist (ie, trazodone, viibryd, trintellix): Continue to take this medication on your normal schedule.  - Alpha-1 Adrenergic Blocker (ie, tamsulosin, doxazosin, alfusozin): Continue to take this medication on your normal schedule.  - Antidepressants: Continue to take this medication on your normal schedule.  - Antipsychotic meds: Continue to take this medication on your normal schedule.  - Hyperlipidemia meds: Continue to take this medication on your normal schedule.  - Methotrexate: Continue to take this medication on your normal schedule.  - Thyroid meds: Continue to take this medication on your normal schedule.      Medicine Instructions for Adults with Diabetes (NO Bowel Prep)    Follow these instructions when a BOWEL PREP is NOT required for your procedure or surgery!    NOTE:  GLP Agonists taken weekly: do not take in the 7 days before your procedure. **Bariatric surgery: do not take 4 weeks prior to your procedure.    SGLT-2 Inhibitors: do not take in the 4 days before your procedure    On the Day Before Surgery/Procedure  If you are having a procedure (e.g., Colonoscopy) or surgery which DOES NOT require a bowel prep, follow the directions below based on the type of medicine you take for your diabetes.  Type of Medicine You Take Examples What to Do   Pre-Mixed Insulin Intermediate  Dirrgeq16/25, Yqvxvva05/30, Novolog 70/30, Regular Insulin Take 1/2 your regular dose the evening before our procedure.   Rapid/Fast Acting  Insulin and/or Long-Acting Insulin Humalog U200, NovoLog, Apidra,  Lantus, Levemir, Tresiba, Toujeo,  Fias, Basaglar Take your FULL regular dose the day before procedure.   Oral Diabetic Medicines (sulfonylurea) Glipizide/Glimepiride/  Glucotrol Take your regular dose with dinner the evening before your procedure.   Other Oral Diabetic Medicines Metformin, Glucophage, Glucophage  XR, Riomet, Glumetza, Actose,  Avandia, Gl set, Prandin Take your regular dose with dinner the evening before  your procedure   GLP Agonists Adlyxin, Byetta, Bydureon,  Ozempic, Soliqua, Tanzeum,  Trulicity, Victoza, Saxenda,  Rybelsus, Wegovy, Mounjaro, Zepbound If taken daily, take as normal  If taken weekly, do not take this medicine for 7 days before your procedure including the day of the procedure (resume taking after the procedure). **Bariatric surgery: do not take 4 weeks prior to procedure   SGLT-2 Inhibitors Jardiance, Invokana, Farxiga, Steglatro, Brenzavvy, Qtern, Segluromet Glyxambi, Synjardy, Synjardy XR, Invokamet, InvokametXR, Trijary XR, Xigduo X Do not take for 4 days before your procedure including the day of the procedure (resume taking after the procedure)   This educational material has been approved by the Patient Education Advisory Committee.    On the Day of Surgery/Procedure  Follow the directions below based on the type of medicine you take for your diabetes.  Type of Medicine You Take  Examples What to Do   Long-Acting Insulin Lantus, Levemir, Tresiba,  Toujeo, Basaglar, Semglee If you normally take your Long Acting Insulin in the morning, take the full dose as scheduled.   GLP-I Agonists Adlyxin, Byetta, Bydureon,  Ozempic, Soliqua, Tanzeum,  Trulicity, Victoza, Saxenda,  Rybelsus, Mounjaro Do NOT take this medicine on the day of your procedure (resume taking after the procedure)   Except for the morning Long-Acting Insulin, DO NOT take ANY diabetic medicine on the day of your procedure unless you were instructed by the doctor who manages your diabetes medicines.  Continue to check your blood sugars.  If you have an insulin pump, ask your endocrinologist for instructions at least 3 days before your procedure. NOTE: If you are not able to ask your endocrinologist in advance, on the day of the procedure set your insulin pump to your basal rate only. Bring your insulin pump supplies to the hospital.         History of Present Illness     Becca presents for preop clearance.  She is going to go undergo  excision of multiple pilar cysts of her scalp on 12/10/2024.  Denies fever, chills, recent illness, chest pain, easy bleeding, bruising, or blood in stools.  She has had no issues with anesthesia in the past.  Recent lab work reviewed.  EKG to be obtained while in office.          Review of Systems   Constitutional: Negative.  Negative for fever.   HENT: Negative.     Eyes: Negative.    Respiratory: Negative.  Negative for chest tightness and shortness of breath.    Cardiovascular: Negative.  Negative for chest pain, palpitations and leg swelling.   Gastrointestinal: Negative.    Endocrine: Negative.    Genitourinary: Negative.    Musculoskeletal: Negative.    Skin: Negative.    Allergic/Immunologic: Negative.    Neurological: Negative.    Hematological: Negative.    Psychiatric/Behavioral: Negative.       Past Medical History   Past Medical History:   Diagnosis Date    Arthritis     RA    Cancer (HCC)     Colon polyp     CPAP (continuous positive airway pressure) dependence     NEO (obstructive sleep apnea)     Sleep apnea     Thyroid cancer (HCC)     Varicella      Past Surgical History:   Procedure Laterality Date    CHOLECYSTECTOMY      COLONOSCOPY      KNEE SURGERY Left     ACL repair     KNEE SURGERY      SKIN GRAFT Right     thumb    THYROIDECTOMY      US GUIDED THYROID BIOPSY  4/13/2018     Family History   Problem Relation Age of Onset    Rheum arthritis Mother     COPD Mother     Seizures Mother     No Known Problems Father     No Known Problems Sister     Hypertension Maternal Grandmother     Lung cancer Maternal Grandmother     Heart disease Maternal Grandmother     Heart disease Maternal Grandfather     Emphysema Maternal Grandfather     No Known Problems Paternal Grandmother     Breast cancer Maternal Aunt 54    No Known Problems Sister     No Known Problems Sister     No Known Problems Sister     No Known Problems Maternal Aunt     No Known Problems Paternal Aunt     No Known Problems Paternal Aunt      No Known Problems Paternal Aunt     No Known Problems Paternal Aunt     Colon cancer Neg Hx     Ovarian cancer Neg Hx     Cervical cancer Neg Hx     Uterine cancer Neg Hx      Social History     Tobacco Use    Smoking status: Former     Current packs/day: 0.00     Average packs/day: 0.5 packs/day for 22.6 years (11.3 ttl pk-yrs)     Types: Cigarettes     Start date:      Quit date: 2015     Years since quittin.3    Smokeless tobacco: Never    Tobacco comments:     quit 5 years    Vaping Use    Vaping status: Never Used   Substance and Sexual Activity    Alcohol use: Not Currently     Comment: socially    Drug use: Yes     Types: Other     Comment: edibles occ    Sexual activity: Yes     Partners: Female     Birth control/protection: None     Current Outpatient Medications on File Prior to Visit   Medication Sig    albuterol (PROVENTIL HFA,VENTOLIN HFA) 90 mcg/act inhaler Inhale 2 puffs 4 (four) times a day    ARIPiprazole (ABILIFY) 5 mg tablet Take 5 mg by mouth daily    ascorbic Acid (VITAMIN C) 500 MG CPCR Take 500 mg by mouth daily    Cholecalciferol (VITAMIN D-3) 5000 units TABS Take 5,000 Units by mouth    Empagliflozin (Jardiance) 10 MG TABS tablet Take 1 tablet (10 mg total) by mouth every morning    folic acid (FOLVITE) 1 mg tablet Take orally once a day    levothyroxine 112 mcg tablet Take 224 mcg by mouth daily    methotrexate 2.5 mg tablet Take 5 tablets by mouth once a week    pantoprazole (PROTONIX) 40 mg tablet TAKE ONE TABLET BY MOUTH EVERY DAY    prazosin (MINIPRESS) 1 mg capsule Take 2 mg by mouth daily at bedtime    rosuvastatin (CRESTOR) 5 mg tablet TAKE ONE TABLET BY MOUTH EVERY DAY    sertraline (ZOLOFT) 100 mg tablet Take 100 mg by mouth daily    traZODone (DESYREL) 50 mg tablet Take 1 tablet by mouth daily as needed    [DISCONTINUED] fluticasone (FLONASE) 50 mcg/act nasal spray 1 spray into each nostril daily (Patient not taking: Reported on 2024)    [DISCONTINUED]  "nystatin (MYCOSTATIN) powder Apply topically 3 (three) times a day (Patient not taking: Reported on 11/4/2024)     No Known Allergies  Objective   /84 (BP Location: Left arm, Patient Position: Sitting)   Pulse 103   Temp 97.7 °F (36.5 °C)   Ht 5' 7\" (1.702 m)   Wt 126 kg (277 lb 3.2 oz)   SpO2 98%   BMI 43.42 kg/m²     Physical Exam  Vitals and nursing note reviewed.   Constitutional:       General: She is not in acute distress.     Appearance: Normal appearance. She is well-developed. She is not ill-appearing, toxic-appearing or diaphoretic.   HENT:      Head: Normocephalic and atraumatic.      Right Ear: External ear normal. There is impacted cerumen.      Left Ear: External ear normal. There is impacted cerumen.      Nose: Nose normal.      Mouth/Throat:      Mouth: Mucous membranes are moist.      Pharynx: Oropharynx is clear. No oropharyngeal exudate.   Eyes:      Conjunctiva/sclera: Conjunctivae normal.      Pupils: Pupils are equal, round, and reactive to light.   Neck:      Thyroid: No thyromegaly.   Cardiovascular:      Rate and Rhythm: Normal rate and regular rhythm.      Pulses: Normal pulses.      Heart sounds: Normal heart sounds. No murmur heard.  Pulmonary:      Effort: Pulmonary effort is normal. No respiratory distress.      Breath sounds: Normal breath sounds. No stridor. No wheezing or rales.   Chest:      Chest wall: No tenderness.   Abdominal:      General: Bowel sounds are normal. There is no distension.      Palpations: Abdomen is soft. There is no mass.      Tenderness: There is no abdominal tenderness. There is no guarding or rebound.      Hernia: No hernia is present.   Musculoskeletal:         General: Normal range of motion.      Cervical back: Normal range of motion and neck supple.   Lymphadenopathy:      Cervical: No cervical adenopathy.   Skin:     General: Skin is warm and dry.      Capillary Refill: Capillary refill takes less than 2 seconds.   Neurological:      " General: No focal deficit present.      Mental Status: She is alert and oriented to person, place, and time.      Cranial Nerves: No cranial nerve deficit.      Gait: Gait normal.   Psychiatric:         Mood and Affect: Mood normal.         Behavior: Behavior normal.         Thought Content: Thought content normal.         Judgment: Judgment normal.       Administrative Statements   I have spent a total time of 38 minutes in caring for this patient on the day of the visit/encounter including Diagnostic results, Prognosis, Risks and benefits of tx options, Instructions for management, Patient and family education, Importance of tx compliance, Risk factor reductions, Impressions, Counseling / Coordination of care, Documenting in the medical record, Reviewing / ordering tests, medicine, procedures  , and Obtaining or reviewing history  .     CRIS [place]

## 2024-11-21 NOTE — ASSESSMENT & PLAN NOTE
Lab Results   Component Value Date    HGBA1C 7.2 (A) 06/13/2024   Controlled.  Counseled on directions of diabetic medicines prior to surgery.

## 2024-11-29 NOTE — PRE-PROCEDURE INSTRUCTIONS
Pre-Surgery Instructions:   Medication Instructions    albuterol (PROVENTIL HFA,VENTOLIN HFA) 90 mcg/act inhaler Uses PRN- OK to take day of surgery    ARIPiprazole (ABILIFY) 5 mg tablet Take day of surgery.    ascorbic Acid (VITAMIN C) 500 MG CPCR Stop taking 7 days prior to surgery.    carbamide peroxide (DEBROX) 6.5 % otic solution Take day of surgery.    Cholecalciferol (VITAMIN D-3) 5000 units TABS Stop taking 7 days prior to surgery.    Empagliflozin (Jardiance) 10 MG TABS tablet Stop taking 4 days prior to surgery.    folic acid (FOLVITE) 1 mg tablet Hold day of surgery.    levothyroxine 112 mcg tablet Take day of surgery.    methotrexate 2.5 mg tablet Hold day of surgery.    pantoprazole (PROTONIX) 40 mg tablet Take day of surgery.    prazosin (MINIPRESS) 1 mg capsule Take night before surgery    rosuvastatin (CRESTOR) 5 mg tablet Take day of surgery.    sertraline (ZOLOFT) 100 mg tablet Take day of surgery.    traZODone (DESYREL) 50 mg tablet Take night before surgery    Medication instructions for day surgery reviewed. Please use only a sip of water to take your instructed medications. Avoid all over the counter vitamins, supplements and NSAIDS for one week prior to surgery per anesthesia guidelines. Tylenol is ok to take as needed.     You will receive a call one business day prior to surgery with an arrival time and hospital directions. If your surgery is scheduled on a Monday, the hospital will be calling you on the Friday prior to your surgery. If you have not heard from anyone by 8pm, please call the hospital supervisor through the hospital  at [phone]. ([place] [phone] or [place] [phone]).    Do not eat or drink anything after midnight the night before your surgery, including candy, mints, lifesavers, or chewing gum. Do not drink alcohol 24hrs before your surgery. Try not to smoke at least 24hrs before your surgery.       Follow the pre surgery showering instructions as  listed in the “My Surgical Experience Booklet” or otherwise provided by your surgeon's office. Do not use a blade to shave the surgical area 1 week before surgery. It is okay to use a clean electric clippers up to 24 hours before surgery. Do not apply any lotions, creams, including makeup, cologne, deodorant, or perfumes after showering on the day of your surgery. Do not use dry shampoo, hair spray, hair gel, or any type of hair products.     No contact lenses, eye make-up, or artificial eyelashes. Remove nail polish, including gel polish, and any artificial, gel, or acrylic nails if possible. Remove all jewelry including rings and body piercing jewelry.     Wear causal clothing that is easy to take on and off. Consider your type of surgery.    Keep any valuables, jewelry, piercings at home. Please bring any specially ordered equipment (sling, braces) if indicated.    Arrange for a responsible person to drive you to and from the hospital on the day of your surgery. Please confirm the visitor policy for the day of your procedure when you receive your phone call with an arrival time.     Call the surgeon's office with any new illnesses, exposures, or additional questions prior to surgery.    Please reference your “My Surgical Experience Booklet” for additional information to prepare for your upcoming surgery.

## 2024-12-04 NOTE — H&P
Plastic Surgery Consultation Note:      HPI: The patient is a pleasant 43-year-old female presenting today for preoperative history and physical for excision of multiple pilar cysts of the scalp on 12/10/2024. The patient states that she has several masses, varying in size, spread diffusely around her scalp.  She states that most of the masses are actively getting larger.  She denies any significant pain, drainage, bleeding, or wounds.  She is interested in having these masses removed.  The patient's past medical history is significant for obesity, thyroid cancer, NEO, rheumatoid arthritis, depression, anxiety, diabetes, and IBS.  The patient does not take any blood thinners or steroids.  She is a former smoker.  She denies any medication allergies and denies a history of blood clots.     Medical History        Past Medical History:   Diagnosis Date    Arthritis       RA    Cancer (HCC)      Colon polyp      CPAP (continuous positive airway pressure) dependence      NEO (obstructive sleep apnea)      Sleep apnea      Thyroid cancer (HCC)      Varicella           Problem List       Patient Active Problem List   Diagnosis    Gastroesophageal reflux disease without esophagitis    BMI 45.0-49.9, adult (HCC)    Post-surgical hypothyroidism    Rheumatoid arthritis involving multiple sites (HCC)    Thyroid cancer (HCC)    Depression with anxiety    Post traumatic stress disorder (PTSD)    Menorrhagia with regular cycle    NEO (obstructive sleep apnea)    Annual physical exam    Irritable bowel syndrome with diarrhea    Abnormal stress ECG with treadmill    Type 2 diabetes mellitus with hyperglycemia, without long-term current use of insulin (HCC)    Panic disorder without agoraphobia with severe panic attacks    Numbness    Hyperlipidemia associated with type 2 diabetes mellitus  (HCC)           Current Medications      Current Outpatient Medications:     albuterol (PROVENTIL HFA,VENTOLIN HFA) 90 mcg/act inhaler, Inhale 2  puffs 4 (four) times a day, Disp: 8 g, Rfl: 0    ARIPiprazole (ABILIFY) 5 mg tablet, Take 5 mg by mouth daily, Disp: , Rfl:     ascorbic Acid (VITAMIN C) 500 MG CPCR, Take 500 mg by mouth daily, Disp: , Rfl:     Cholecalciferol (VITAMIN D-3) 5000 units TABS, Take 5,000 Units by mouth, Disp: , Rfl:     Empagliflozin (Jardiance) 10 MG TABS tablet, Take 1 tablet (10 mg total) by mouth every morning, Disp: 90 tablet, Rfl: 1    folic acid (FOLVITE) 1 mg tablet, Take orally once a day, Disp: 90 tablet, Rfl: 1    levothyroxine 112 mcg tablet, Take 224 mcg by mouth daily, Disp: , Rfl:     methotrexate 2.5 mg tablet, Take 5 tablets by mouth once a week, Disp: , Rfl:     pantoprazole (PROTONIX) 40 mg tablet, TAKE ONE TABLET BY MOUTH EVERY DAY, Disp: 30 tablet, Rfl: 5    prazosin (MINIPRESS) 1 mg capsule, Take 2 mg by mouth daily at bedtime, Disp: , Rfl:     rosuvastatin (CRESTOR) 5 mg tablet, TAKE ONE TABLET BY MOUTH EVERY DAY, Disp: 90 tablet, Rfl: 1    sertraline (ZOLOFT) 100 mg tablet, Take 100 mg by mouth daily, Disp: , Rfl:     traZODone (DESYREL) 50 mg tablet, Take 1 tablet by mouth daily as needed, Disp: , Rfl:     fluticasone (FLONASE) 50 mcg/act nasal spray, 1 spray into each nostril daily (Patient not taking: Reported on 11/4/2024), Disp: 9.9 mL, Rfl: 0    nystatin (MYCOSTATIN) powder, Apply topically 3 (three) times a day (Patient not taking: Reported on 11/4/2024), Disp: 30 g, Rfl: 1        Surgical History         Past Surgical History:   Procedure Laterality Date    CHOLECYSTECTOMY        COLONOSCOPY        KNEE SURGERY Left       ACL repair     KNEE SURGERY        SKIN GRAFT Right       thumb    THYROIDECTOMY        US GUIDED THYROID BIOPSY   4/13/2018         Social History            Tobacco Use    Smoking status: Former       Current packs/day: 0.00       Average packs/day: 0.5 packs/day for 22.6 years (11.3 ttl pk-yrs)       Types: Cigarettes       Start date: 1993       Quit date: 7/25/2015       Years  since quittin.2    Smokeless tobacco: Never    Tobacco comments:       quit 5 years    Substance Use Topics    Alcohol use: Not Currently       Comment: socially      Objective:  Vitals:    12/10/24 1130   BP: 111/57   Pulse: 58   Resp: 12   Temp: (!) 97.4 °F (36.3 °C)   SpO2: 97%        Physical Exam:  General: NAD, alert, cooperative  Heart: Regular rate  Lungs: Normal respiratory effort  Skin: Multiple (about 10) well-defined subcutaneous masses of the scalp.  Masses are soft and mobile.  There is no evidence of drainage, surrounding erythema, wounds, or signs of infection.  Masses range in size from 5 mm to 1.5cm. Consistent with pilar cysts.      Assessment: 43-year-old female with multiple masses of the scalp     Plan:  Labs reviewed  Going for PCP clearance 24     Advised patient that due to the number of scalp masses that she has, this procedure should likely be done in the operating room.  Discussed the procedure in detail with the patient including the risks of bleeding, infection, scarring, recurrence, need for additional procedures.  Discussed with the patient that she would have sutures/staples in her scalp for about 2 weeks and specimens will be sent to pathology for definitive testing.  Discussed the use of prophylactic antibiotics as well.  The patient understands and agrees with the plan of care, no additional questions at this time.      Today I discussed the surgery in detail with the patient regarding the expected length of surgery, the expected hospital stay, and the expected recovery time. We discussed the potential risks and complications such as bleeding, infection, scarring, hematoma, seroma, asymmetry, contour deformity, skin necrosis, damage to surrounding structures, wound healing complications, and need for additional procedures. Postoperative activity restrictions and medications were discussed. The patient understands and agrees to the plan of care and does not have any  additional questions at this time. Informed consent was obtained today for the upcoming surgery.        Jorge Martin PA-C  Plastic & Reconstructive Surgery

## 2024-12-09 ENCOUNTER — ANESTHESIA EVENT (OUTPATIENT)
Dept: PERIOP | Facility: HOSPITAL | Age: 44
End: 2024-12-09
Payer: COMMERCIAL

## 2024-12-10 ENCOUNTER — ANESTHESIA (OUTPATIENT)
Dept: PERIOP | Facility: HOSPITAL | Age: 44
End: 2024-12-10
Payer: COMMERCIAL

## 2024-12-10 ENCOUNTER — HOSPITAL ENCOUNTER (OUTPATIENT)
Facility: HOSPITAL | Age: 44
Setting detail: OUTPATIENT SURGERY
Discharge: HOME | End: 2024-12-10
Attending: PLASTIC SURGERY | Admitting: PLASTIC SURGERY
Payer: COMMERCIAL

## 2024-12-10 VITALS
TEMPERATURE: 97 F | SYSTOLIC BLOOD PRESSURE: 138 MMHG | DIASTOLIC BLOOD PRESSURE: 85 MMHG | OXYGEN SATURATION: 95 % | HEART RATE: 67 BPM | BODY MASS INDEX: 42.6 KG/M2 | HEIGHT: 67 IN | WEIGHT: 271.39 LBS | RESPIRATION RATE: 15 BRPM

## 2024-12-10 DIAGNOSIS — L72.11 PILAR CYSTS: ICD-10-CM

## 2024-12-10 LAB
EXT PREGNANCY TEST URINE: NEGATIVE
EXT. CONTROL: NORMAL
GLUCOSE SERPL-MCNC: 127 MG/DL (ref 65–140)

## 2024-12-10 PROCEDURE — 11423 EXC H-F-NK-SP B9+MARG 2.1-3: CPT | Performed by: PHYSICIAN ASSISTANT

## 2024-12-10 PROCEDURE — 11421 EXC H-F-NK-SP B9+MARG 0.6-1: CPT | Performed by: PHYSICIAN ASSISTANT

## 2024-12-10 PROCEDURE — 12034 INTMD RPR S/TR/EXT 7.6-12.5: CPT | Performed by: PLASTIC SURGERY

## 2024-12-10 PROCEDURE — 81025 URINE PREGNANCY TEST: CPT | Performed by: PHYSICIAN ASSISTANT

## 2024-12-10 PROCEDURE — 11421 EXC H-F-NK-SP B9+MARG 0.6-1: CPT | Performed by: PLASTIC SURGERY

## 2024-12-10 PROCEDURE — 88304 TISSUE EXAM BY PATHOLOGIST: CPT | Performed by: PATHOLOGY

## 2024-12-10 PROCEDURE — 11423 EXC H-F-NK-SP B9+MARG 2.1-3: CPT | Performed by: PLASTIC SURGERY

## 2024-12-10 PROCEDURE — 12034 INTMD RPR S/TR/EXT 7.6-12.5: CPT | Performed by: PHYSICIAN ASSISTANT

## 2024-12-10 PROCEDURE — 82948 REAGENT STRIP/BLOOD GLUCOSE: CPT

## 2024-12-10 RX ORDER — LIDOCAINE HYDROCHLORIDE AND EPINEPHRINE 10; 10 MG/ML; UG/ML
INJECTION, SOLUTION INFILTRATION; PERINEURAL AS NEEDED
Status: DISCONTINUED | OUTPATIENT
Start: 2024-12-10 | End: 2024-12-10 | Stop reason: HOSPADM

## 2024-12-10 RX ORDER — DEXAMETHASONE SODIUM PHOSPHATE 10 MG/ML
INJECTION, SOLUTION INTRAMUSCULAR; INTRAVENOUS AS NEEDED
Status: DISCONTINUED | OUTPATIENT
Start: 2024-12-10 | End: 2024-12-10

## 2024-12-10 RX ORDER — PROPOFOL 10 MG/ML
INJECTION, EMULSION INTRAVENOUS AS NEEDED
Status: DISCONTINUED | OUTPATIENT
Start: 2024-12-10 | End: 2024-12-10

## 2024-12-10 RX ORDER — FENTANYL CITRATE/PF 50 MCG/ML
25 SYRINGE (ML) INJECTION
Status: DISCONTINUED | OUTPATIENT
Start: 2024-12-10 | End: 2024-12-10 | Stop reason: HOSPADM

## 2024-12-10 RX ORDER — SODIUM CHLORIDE 9 MG/ML
125 INJECTION, SOLUTION INTRAVENOUS CONTINUOUS
Status: DISCONTINUED | OUTPATIENT
Start: 2024-12-10 | End: 2024-12-10 | Stop reason: HOSPADM

## 2024-12-10 RX ORDER — CEPHALEXIN 500 MG/1
500 CAPSULE ORAL 3 TIMES DAILY
Qty: 21 CAPSULE | Refills: 0 | Status: SHIPPED | OUTPATIENT
Start: 2024-12-10 | End: 2024-12-17

## 2024-12-10 RX ORDER — EPHEDRINE SULFATE 50 MG/ML
INJECTION INTRAVENOUS AS NEEDED
Status: DISCONTINUED | OUTPATIENT
Start: 2024-12-10 | End: 2024-12-10

## 2024-12-10 RX ORDER — MIDAZOLAM HYDROCHLORIDE 2 MG/2ML
INJECTION, SOLUTION INTRAMUSCULAR; INTRAVENOUS AS NEEDED
Status: DISCONTINUED | OUTPATIENT
Start: 2024-12-10 | End: 2024-12-10

## 2024-12-10 RX ORDER — OXYCODONE HYDROCHLORIDE 5 MG/1
5 TABLET ORAL EVERY 6 HOURS PRN
Qty: 5 TABLET | Refills: 0 | Status: SHIPPED | OUTPATIENT
Start: 2024-12-10

## 2024-12-10 RX ORDER — ROCURONIUM BROMIDE 10 MG/ML
INJECTION, SOLUTION INTRAVENOUS AS NEEDED
Status: DISCONTINUED | OUTPATIENT
Start: 2024-12-10 | End: 2024-12-10

## 2024-12-10 RX ORDER — SODIUM CHLORIDE, SODIUM LACTATE, POTASSIUM CHLORIDE, CALCIUM CHLORIDE 600; 310; 30; 20 MG/100ML; MG/100ML; MG/100ML; MG/100ML
INJECTION, SOLUTION INTRAVENOUS CONTINUOUS PRN
Status: DISCONTINUED | OUTPATIENT
Start: 2024-12-10 | End: 2024-12-10

## 2024-12-10 RX ORDER — GINSENG 100 MG
CAPSULE ORAL AS NEEDED
Status: DISCONTINUED | OUTPATIENT
Start: 2024-12-10 | End: 2024-12-10 | Stop reason: HOSPADM

## 2024-12-10 RX ORDER — MAGNESIUM HYDROXIDE 1200 MG/15ML
LIQUID ORAL AS NEEDED
Status: DISCONTINUED | OUTPATIENT
Start: 2024-12-10 | End: 2024-12-10 | Stop reason: HOSPADM

## 2024-12-10 RX ORDER — OXYCODONE HYDROCHLORIDE 5 MG/1
5 TABLET ORAL EVERY 4 HOURS PRN
Status: DISCONTINUED | OUTPATIENT
Start: 2024-12-10 | End: 2024-12-10 | Stop reason: HOSPADM

## 2024-12-10 RX ORDER — ONDANSETRON 2 MG/ML
INJECTION INTRAMUSCULAR; INTRAVENOUS AS NEEDED
Status: DISCONTINUED | OUTPATIENT
Start: 2024-12-10 | End: 2024-12-10

## 2024-12-10 RX ORDER — HYDROMORPHONE HCL/PF 1 MG/ML
0.5 SYRINGE (ML) INJECTION
Status: DISCONTINUED | OUTPATIENT
Start: 2024-12-10 | End: 2024-12-10 | Stop reason: HOSPADM

## 2024-12-10 RX ORDER — GABAPENTIN 300 MG/1
300 CAPSULE ORAL 3 TIMES DAILY
Qty: 21 CAPSULE | Refills: 0 | Status: SHIPPED | OUTPATIENT
Start: 2024-12-10 | End: 2024-12-17

## 2024-12-10 RX ORDER — ACETAMINOPHEN 325 MG/1
975 TABLET ORAL ONCE
Status: COMPLETED | OUTPATIENT
Start: 2024-12-10 | End: 2024-12-10

## 2024-12-10 RX ORDER — PROMETHAZINE HYDROCHLORIDE 25 MG/ML
12.5 INJECTION, SOLUTION INTRAMUSCULAR; INTRAVENOUS ONCE AS NEEDED
Status: DISCONTINUED | OUTPATIENT
Start: 2024-12-10 | End: 2024-12-10 | Stop reason: HOSPADM

## 2024-12-10 RX ORDER — FENTANYL CITRATE 50 UG/ML
INJECTION, SOLUTION INTRAMUSCULAR; INTRAVENOUS AS NEEDED
Status: DISCONTINUED | OUTPATIENT
Start: 2024-12-10 | End: 2024-12-10

## 2024-12-10 RX ORDER — LIDOCAINE HYDROCHLORIDE 10 MG/ML
INJECTION, SOLUTION EPIDURAL; INFILTRATION; INTRACAUDAL; PERINEURAL AS NEEDED
Status: DISCONTINUED | OUTPATIENT
Start: 2024-12-10 | End: 2024-12-10

## 2024-12-10 RX ORDER — ONDANSETRON 2 MG/ML
4 INJECTION INTRAMUSCULAR; INTRAVENOUS ONCE
Status: COMPLETED | OUTPATIENT
Start: 2024-12-10 | End: 2024-12-10

## 2024-12-10 RX ORDER — ACETAMINOPHEN 500 MG
1000 TABLET ORAL EVERY 6 HOURS
Qty: 56 TABLET | Refills: 0 | Status: SHIPPED | OUTPATIENT
Start: 2024-12-10 | End: 2024-12-17

## 2024-12-10 RX ADMIN — HYDROMORPHONE HYDROCHLORIDE 0.5 MG: 1 INJECTION, SOLUTION INTRAMUSCULAR; INTRAVENOUS; SUBCUTANEOUS at 15:56

## 2024-12-10 RX ADMIN — ROCURONIUM BROMIDE 10 MG: 10 INJECTION, SOLUTION INTRAVENOUS at 13:45

## 2024-12-10 RX ADMIN — EPHEDRINE SULFATE 5 MG: 50 INJECTION, SOLUTION INTRAVENOUS at 13:58

## 2024-12-10 RX ADMIN — CEFAZOLIN 3000 MG: 1 INJECTION, POWDER, FOR SOLUTION INTRAMUSCULAR; INTRAVENOUS at 13:35

## 2024-12-10 RX ADMIN — ONDANSETRON 4 MG: 2 INJECTION INTRAMUSCULAR; INTRAVENOUS at 15:42

## 2024-12-10 RX ADMIN — SUGAMMADEX 200 MG: 100 INJECTION, SOLUTION INTRAVENOUS at 14:58

## 2024-12-10 RX ADMIN — PROPOFOL 200 MG: 10 INJECTION, EMULSION INTRAVENOUS at 13:34

## 2024-12-10 RX ADMIN — FENTANYL CITRATE 50 MCG: 50 INJECTION INTRAMUSCULAR; INTRAVENOUS at 13:43

## 2024-12-10 RX ADMIN — PROPOFOL 50 MG: 10 INJECTION, EMULSION INTRAVENOUS at 14:54

## 2024-12-10 RX ADMIN — OXYCODONE HYDROCHLORIDE 5 MG: 5 TABLET ORAL at 16:42

## 2024-12-10 RX ADMIN — ROCURONIUM BROMIDE 40 MG: 10 INJECTION, SOLUTION INTRAVENOUS at 13:34

## 2024-12-10 RX ADMIN — FENTANYL CITRATE 25 MCG: 50 INJECTION INTRAMUSCULAR; INTRAVENOUS at 15:25

## 2024-12-10 RX ADMIN — SODIUM CHLORIDE, SODIUM LACTATE, POTASSIUM CHLORIDE, AND CALCIUM CHLORIDE: .6; .31; .03; .02 INJECTION, SOLUTION INTRAVENOUS at 13:00

## 2024-12-10 RX ADMIN — LIDOCAINE HYDROCHLORIDE 50 MG: 10 INJECTION, SOLUTION EPIDURAL; INFILTRATION; INTRACAUDAL at 13:33

## 2024-12-10 RX ADMIN — FENTANYL CITRATE 25 MCG: 50 INJECTION INTRAMUSCULAR; INTRAVENOUS at 15:45

## 2024-12-10 RX ADMIN — FENTANYL CITRATE 50 MCG: 50 INJECTION INTRAMUSCULAR; INTRAVENOUS at 13:34

## 2024-12-10 RX ADMIN — DEXMEDETOMIDINE HYDROCHLORIDE 12 MCG: 100 INJECTION, SOLUTION, CONCENTRATE INTRAVENOUS at 13:44

## 2024-12-10 RX ADMIN — DEXMEDETOMIDINE HYDROCHLORIDE 8 MCG: 100 INJECTION, SOLUTION, CONCENTRATE INTRAVENOUS at 13:32

## 2024-12-10 RX ADMIN — ONDANSETRON 4 MG: 2 INJECTION INTRAMUSCULAR; INTRAVENOUS at 14:37

## 2024-12-10 RX ADMIN — ACETAMINOPHEN 975 MG: 325 TABLET, FILM COATED ORAL at 11:36

## 2024-12-10 RX ADMIN — MIDAZOLAM 2 MG: 1 INJECTION INTRAMUSCULAR; INTRAVENOUS at 13:29

## 2024-12-10 RX ADMIN — PROPOFOL 50 MG: 10 INJECTION, EMULSION INTRAVENOUS at 14:52

## 2024-12-10 RX ADMIN — DEXAMETHASONE SODIUM PHOSPHATE 10 MG: 10 INJECTION, SOLUTION INTRAMUSCULAR; INTRAVENOUS at 13:37

## 2024-12-10 NOTE — ANESTHESIA POSTPROCEDURE EVALUATION
Post-Op Assessment Note    CV Status:  Stable  Pain Score: 0    Pain management: adequate       Mental Status:  Alert and awake   Hydration Status:  Euvolemic   PONV Controlled:  Controlled   Airway Patency:  Patent  Airway: intubated  Two or more mitigation strategies used for obstructive sleep apnea   Post Op Vitals Reviewed: Yes    No anethesia notable event occurred.    Staff: CRNA           Last Filed PACU Vitals:  Vitals Value Taken Time   Temp 97 °F (36.1 °C) 12/10/24 1515   Pulse 79 12/10/24 1518   /55 12/10/24 1517   Resp 20 12/10/24 1518   SpO2 90 % 12/10/24 1518   Vitals shown include unfiled device data.    Modified Elle:  No data recorded

## 2024-12-10 NOTE — DISCHARGE INSTR - AVS FIRST PAGE
Plastic Surgery Discharge Instructions:     You may shower in 48 hours.  Twice daily, apply antibiotic ointment, like Neosporin, to your incisions.  Your sutures and staples will be removed in about 2 weeks.   You may ice the area for several minutes every few hours, but do not apply ice directly to incision. Place washcloth between skin and ice.   If you experience swelling and bruising, this is normal and to be expected after your procedure.   No pushing, pulling, or lifting more than 10lbs or strenuous activity for 4 weeks post-operatively.         All Surgeries     You must be off all pain medications and have a clear field of vision before driving  For the next 24 hours:  Do not sign any legal documents or operate machinery.  Have a responsible adult help you.  Take it easy & rest.  Clear liquids first, if no nausea, progress to soft diet, and then regular diet as tolerated.  Take medications as ordered, and do not take any pain medication on an empty stomach. Once pain meds are finished you may alternate Tylenol & Advil as directed for pain  Make sure to take all antibiotics until completion  If lovenox or heparin was prescribed, use as directed until completion  Avoid alcoholic beverages.  Resume any prior medications at home unless otherwise instructed by Dr. Gibbs.  Call Dr. Gibbs at [phone] -office,  Obvious bleeding, excessive swelling, and tenderness  Hardness of face on palpation  Swelling & hardness at eyelids.  Fever over 101.0°   Shortness of breath, severe calf or thigh pain.  Redness, odor, or pus at the wound {some oozing is normal from incision sites and may continue for several days ABD pads or feminine pads can be used for absorption )  Persistent vomiting or pain that is not relieved by your medication.  To make your follow-up appointment , ask a question, or report a problem

## 2024-12-10 NOTE — ANESTHESIA PREPROCEDURE EVALUATION
Procedure:  EXCISION OF MULTIPLE PILAR CYSTS OF SCALP (Neck)    Relevant Problems   CARDIO   (+) Hyperlipidemia associated with type 2 diabetes mellitus  (HCC)      ENDO   (+) Post-surgical hypothyroidism   (+) Type 2 diabetes mellitus with hyperglycemia, without long-term current use of insulin (HCC)      GI/HEPATIC   (+) Gastroesophageal reflux disease without esophagitis      MUSCULOSKELETAL   (+) Rheumatoid arthritis involving multiple sites (HCC)      NEURO/PSYCH   (+) Depression with anxiety   (+) Panic disorder without agoraphobia with severe panic attacks   (+) Post traumatic stress disorder (PTSD)   (+) Severe major depressive disorder (HCC)      PULMONARY   (+) Mild intermittent asthma with acute exacerbation   (+) NEO (obstructive sleep apnea)      Other   (+) BMI 45.0-49.9, adult (HCC)      Thyroid cancer (HCC)    Arthritis RA   Varicella    Colon polyp    Sleep apnea    NEO (obstructive sleep apnea)    Cancer (HCC)    CPAP (continuous positive airway pressure) dependence      SUMMARY:  -  Stress results: There was no chest pain during stress.  -  ECG conclusions: The stress ECG was negative for ischemia and normal.  -  Perfusion imaging: There were no perfusion defects.  -  Gated SPECT: The calculated left ventricular ejection fraction was 65 %. There was no left ventricular regional abnormality.     IMPRESSIONS: Normal study. Myocardial perfusion imaging was normal at rest and with stress. Left ventricular systolic function was normal.      Physical Exam    Airway    Mallampati score: I  TM Distance: >3 FB  Neck ROM: full     Dental       Cardiovascular  Cardiovascular exam normal    Pulmonary  Pulmonary exam normal     Other Findings  post-pubertal.      Anesthesia Plan  ASA Score- 3     Anesthesia Type- general with ASA Monitors.         Additional Monitors:     Airway Plan: ETT.           Plan Factors-Exercise tolerance (METS): >4 METS.    Chart reviewed. EKG reviewed. Imaging results reviewed.  Existing labs reviewed. Patient summary reviewed.                  Induction- intravenous.    Postoperative Plan- Plan for postoperative opioid use. Planned trial extubation        Informed Consent- Anesthetic plan and risks discussed with patient.  I personally reviewed this patient with the CRNA. Discussed and agreed on the Anesthesia Plan with the CRNA..

## 2024-12-12 NOTE — OP NOTE
OPERATIVE REPORT  PATIENT NAME: Vanessa Torres    :  1980  MRN: [id number]  Pt Location: [facility] OR ROOM 04    SURGERY DATE: 12/10/2024    Surgeons and Role:     * Orion Gibbs MD - Primary     * Jorge Sarmiento PA-C - Assisting, no qualified resident available    Preop Diagnosis:  Multiple Pilar Cysts of the scalp x6    Post-Op Diagnosis Codes:     * Pilar cysts [L72.11]    Procedure(s):  EXCISION OF MULTIPLE PILAR CYSTS OF SCALP, 5 lesions, 1 cm each, 1 lesion approximately 3 cm with intermediate closure of 5cm, complex closure of 4cm    Specimen(s):  ID Type Source Tests Collected by Time Destination   1 : LEFT anterior lateral scalp Tissue Skin, Cyst/Tag/Debridement TISSUE EXAM Orion Gibbs MD 12/10/2024  2:12 PM    2 : Midline Scalp Tissue Skin, Cyst/Tag/Debridement TISSUE EXAM Orion Gibbs MD 12/10/2024  2:13 PM    3 : LEFT posterior lateral scalp Tissue Skin, Cyst/Tag/Debridement TISSUE EXAM Orion Gibbs MD 12/10/2024  2:25 PM    4 : MIDLINE posterior scalp Tissue Skin, Cyst/Tag/Debridement TISSUE EXAM Orion Gibbs MD 12/10/2024  2:47 PM    5 : RIGHT lateral scalp Tissue Skin, Cyst/Tag/Debridement TISSUE EXAM Orion Gibbs MD 12/10/2024  2:51 PM    6 : Anterior midline scalp Tissue Skin, Cyst/Tag/Debridement TISSUE EXAM Orion Gibbs MD 12/10/2024  3:03 PM        Estimated Blood Loss:   10 mL    Drains:  * No LDAs found *    Anesthesia Type:   IV Sedation with Anesthesia    Operative Indications:  Pilar cysts [L72.11]  As above    Operative Findings:  Multiple pilar cysts      Complications:   None    Procedure and Technique:      Patient is a 43-year-old female who is known to our service for previous history of multiple pilar cysts of the scalp.  Because of the size, diffuse nature and location, is felt that these lesions would be best excised in the OR, we discussed the risks, benefits, alternatives of treatment including excision including  the risk of bleeding, infection, scarring, poor wound healing, damage to surrounding and underlying structures, need for further surgery, need for multiple procedures, recurrence, infection, hair loss, need for further surgery, need for multiple procedures, poor aesthetic result, all her questions were answered to her satisfaction, informed consent obtained and signed the patient is brought to the hospital as an outpatient elective basis to have the procedures performed.    Patient was first brought to the preoperative holding area she was given preoperative antibiotics and her SCDs were activated prior to induction of anesthesia.  She was then brought to the OR she identified verbally, by site, and by armband on the operating table prior to induction of anesthesia.  After the induction of anesthesia she was prepped and draped in the standard surgical fashion time was performed the surgical site identified.    The patient had identified the areas of chief concern preoperatively, these areas were shaved in order to identify the area and prevent hair from being entrapped in the incisions.  These areas were first anesthetized with 1% lidocaine with epinephrine.  The procedure began on the right-hand side,, where incisions were made directly over the cyst, with a 15 blade, dissection was taken down through the skin, to the level of the dermis, the cysts were readily and easily identifiable from the surrounding tissue and removed easily 360 degrees.  The areas were copiously irrigated, a 3-0 Monocryl suture was placed in the deep dermal layer and staples used to close the skin.  The procedure began proceeding then to the anterior scalp, the patient had a large lesion here, this was densely adhered to the overlying skin and was felt to not be excised without taking the overlying skin, a 4 cm elliptical incision was made around the lesion and a 15 blade was used to deepen the incision down to the level of the subcutaneous  tissue, what was discovered was actually multiple current lesions densely congregated in this area.,  They are readily identifiable from the surrounding tissue and then excised totally.  The area was once again copiously irrigated, at this point the tissue was then advanced to the level of the galea, and closed by closing the deep dermal layer and galea layer with 3-0 Monocryl once again staples in the skin.  The procedure continued by excising the lesions on the left-hand side, on the left-hand side there is also a lesion with a centralized pit that was excised and sent for specimen, this was closed by closing a 3-0 Monocryl in the deep dermal layer and a 4-0 Prolene in the skin.  In similar fashion a other lesion on the posterior scalp and left lateral scalp was excised, each measuring approxi-1 cm closed in the same fashion.  The patient tolerated the procedure well without complication, bacitracin was applied, she was extubated and in stable condition sent to the recovery room, we will monitor her status, she will likely be discharged today.  Jorge Martin, PAC assistance was necessary as no qualified resident was available, assistant was necessary for flap elevation, retraction, exposure given the patient's body habitus and wound closure.         I was present for the entire procedure.    Patient Disposition:  PACU     This procedure was not performed to treat primary cutaneous melanoma through wide local excision           SIGNATURE: Orion Gibbs MD  DATE: December 12, 2024  TIME: 11:44 AM

## 2024-12-13 PROCEDURE — 88304 TISSUE EXAM BY PATHOLOGIST: CPT | Performed by: PATHOLOGY

## 2024-12-16 ENCOUNTER — OFFICE VISIT (OUTPATIENT)
Age: 44
End: 2024-12-16

## 2024-12-16 VITALS
TEMPERATURE: 97.7 F | DIASTOLIC BLOOD PRESSURE: 96 MMHG | BODY MASS INDEX: 42.53 KG/M2 | HEART RATE: 71 BPM | HEIGHT: 67 IN | WEIGHT: 271 LBS | SYSTOLIC BLOOD PRESSURE: 141 MMHG

## 2024-12-16 DIAGNOSIS — Z48.89 ENCOUNTER FOR FOLLOW-UP CARE INVOLVING PLASTIC SURGERY: Primary | ICD-10-CM

## 2024-12-16 PROCEDURE — 99024 POSTOP FOLLOW-UP VISIT: CPT | Performed by: PHYSICIAN ASSISTANT

## 2024-12-16 NOTE — LETTER
December 16, 2024     Patient: Vanessa Torres   YOB: 1980   Date of Visit: 12/16/2024       To Whom it May Concern:    Vanessa Torres was seen in my clinic on 12/16/2024. She may return to work on 12/16/2024 .    If you have any questions or concerns, please don't hesitate to call.         Sincerely,          Jorge Sarmiento PA-C        CC: No Recipients

## 2024-12-16 NOTE — PROGRESS NOTES
Plastic Surgery Follow Up Note:     HPI: The patient is a pleasant 43-year-old female presenting for postoperative follow-up today status post excision of multiple pilar cyst of the scalp on 12/10/2024.  The patient states that she is doing well, her pain has improved.  She takes Tylenol as needed and is taking antibiotics.  She has been applying antibiotic ointment to her scalp incisions.  No questions or concerns at this time.    Past Medical History:   Diagnosis Date    Arthritis     RA    Cancer (HCC)     Colon polyp     CPAP (continuous positive airway pressure) dependence     NEO (obstructive sleep apnea)     Sleep apnea     Thyroid cancer (HCC)     Varicella        Patient Active Problem List   Diagnosis    Gastroesophageal reflux disease without esophagitis    BMI 45.0-49.9, adult (HCC)    Post-surgical hypothyroidism    Rheumatoid arthritis involving multiple sites (HCC)    Thyroid cancer (HCC)    Depression with anxiety    Post traumatic stress disorder (PTSD)    Menorrhagia with regular cycle    NEO (obstructive sleep apnea)    Annual physical exam    Irritable bowel syndrome with diarrhea    Abnormal stress ECG with treadmill    Type 2 diabetes mellitus with hyperglycemia, without long-term current use of insulin (HCC)    Panic disorder without agoraphobia with severe panic attacks    Numbness    Hyperlipidemia associated with type 2 diabetes mellitus  (HCC)    Severe major depressive disorder (HCC)    Mild intermittent asthma with acute exacerbation         Current Outpatient Medications:     acetaminophen (TYLENOL) 500 mg tablet, Take 2 tablets (1,000 mg total) by mouth every 6 (six) hours for 7 days, Disp: 56 tablet, Rfl: 0    albuterol (PROVENTIL HFA,VENTOLIN HFA) 90 mcg/act inhaler, Inhale 2 puffs 4 (four) times a day, Disp: 8 g, Rfl: 0    ARIPiprazole (ABILIFY) 5 mg tablet, Take 5 mg by mouth daily, Disp: , Rfl:     ascorbic Acid (VITAMIN C) 500 MG CPCR, Take 500 mg by mouth daily, Disp: , Rfl:      cephalexin (KEFLEX) 500 mg capsule, Take 1 capsule (500 mg total) by mouth 3 (three) times a day for 7 days, Disp: 21 capsule, Rfl: 0    Cholecalciferol (VITAMIN D-3) 5000 units TABS, Take 5,000 Units by mouth, Disp: , Rfl:     Empagliflozin (Jardiance) 10 MG TABS tablet, Take 1 tablet (10 mg total) by mouth every morning, Disp: 90 tablet, Rfl: 1    folic acid (FOLVITE) 1 mg tablet, Take orally once a day, Disp: 90 tablet, Rfl: 1    gabapentin (Neurontin) 300 mg capsule, Take 1 capsule (300 mg total) by mouth 3 (three) times a day for 7 days, Disp: 21 capsule, Rfl: 0    levothyroxine 112 mcg tablet, Take 224 mcg by mouth daily, Disp: , Rfl:     methotrexate 2.5 mg tablet, Take 8 tablets by mouth once a week, Disp: , Rfl:     oxyCODONE (Roxicodone) 5 immediate release tablet, Take 1 tablet (5 mg total) by mouth every 6 (six) hours as needed for severe pain Max Daily Amount: 20 mg, Disp: 5 tablet, Rfl: 0    pantoprazole (PROTONIX) 40 mg tablet, TAKE ONE TABLET BY MOUTH EVERY DAY, Disp: 30 tablet, Rfl: 5    prazosin (MINIPRESS) 1 mg capsule, Take 2 mg by mouth daily at bedtime, Disp: , Rfl:     rosuvastatin (CRESTOR) 5 mg tablet, TAKE ONE TABLET BY MOUTH EVERY DAY, Disp: 90 tablet, Rfl: 1    sertraline (ZOLOFT) 100 mg tablet, Take 100 mg by mouth daily, Disp: , Rfl:     traZODone (DESYREL) 50 mg tablet, Take 1 tablet by mouth daily as needed, Disp: , Rfl:     carbamide peroxide (DEBROX) 6.5 % otic solution, Administer 5 drops into both ears in the morning for 4 days, Disp: 1 mL, Rfl: 0    Past Surgical History:   Procedure Laterality Date    CHOLECYSTECTOMY      COLONOSCOPY      KNEE SURGERY Left     ACL repair     KNEE SURGERY      MT EXC TUMOR SOFT TISS FACE&SCALP SUBFASCIAL <2CM N/A 12/10/2024    Procedure: EXCISION OF MULTIPLE PILAR CYSTS OF SCALP;  Surgeon: Orion Gibbs MD;  Location: [facility];  Service: Plastics    SKIN GRAFT Right     thumb    THYROIDECTOMY      US GUIDED THYROID BIOPSY  4/13/2018        Social History     Tobacco Use    Smoking status: Former     Current packs/day: 0.00     Average packs/day: 0.5 packs/day for 22.6 years (11.3 ttl pk-yrs)     Types: Cigarettes     Start date:      Quit date: 2015     Years since quittin.4    Smokeless tobacco: Never    Tobacco comments:     quit 5 years    Substance Use Topics    Alcohol use: Not Currently       Objective:  Vitals:    24 0904   BP: 141/96   Pulse: 71   Temp: 97.7 °F (36.5 °C)       Physical Exam:  General: NAD, alert, cooperative    Skin: 6 incisions of scalp are clean, dry, and intact.  Staples and sutures intact.  Some overlying crusting but no active bleeding, or signs of infection.  No evidence of wound formation.    Assessment: 43-year-old female status post excision of multiple pilar cyst of the scalp    Plan:  The patient is doing well today.  I will see her back in 1 week for removal of staples and sutures.  Pathology reviewed.  Advised patient to finish antibiotics and to continue to apply ointment to her scalp.  She may wash her hair gently.  To call sooner with any questions or concerns.      Jorge Sarmiento PA-C  Plastic & Reconstructive Surgery

## 2024-12-23 ENCOUNTER — OFFICE VISIT (OUTPATIENT)
Dept: PLASTIC SURGERY | Facility: CLINIC | Age: 44
End: 2024-12-23

## 2024-12-23 VITALS
HEART RATE: 80 BPM | WEIGHT: 273 LBS | BODY MASS INDEX: 42.85 KG/M2 | RESPIRATION RATE: 16 BRPM | SYSTOLIC BLOOD PRESSURE: 126 MMHG | OXYGEN SATURATION: 98 % | HEIGHT: 67 IN | TEMPERATURE: 97.3 F | DIASTOLIC BLOOD PRESSURE: 76 MMHG

## 2024-12-23 DIAGNOSIS — Z48.89 ENCOUNTER FOR FOLLOW-UP CARE INVOLVING PLASTIC SURGERY: Primary | ICD-10-CM

## 2024-12-23 PROCEDURE — 99024 POSTOP FOLLOW-UP VISIT: CPT | Performed by: PHYSICIAN ASSISTANT

## 2024-12-23 NOTE — PROGRESS NOTES
Plastic Surgery Follow Up Note:     HPI: The patient is a pleasant 43-year-old female presenting for postoperative follow-up today status post excision of multiple pilar cyst of the scalp on 12/10/2024.  The patient states that she is doing well.  She has been applying antibiotic ointment to her scalp incisions.  No questions or concerns at this time. Presents for suture and staple removal.     Past Medical History:   Diagnosis Date    Arthritis     RA    Cancer (HCC)     Colon polyp     CPAP (continuous positive airway pressure) dependence     NEO (obstructive sleep apnea)     Sleep apnea     Thyroid cancer (HCC)     Varicella      Patient Active Problem List   Diagnosis    Gastroesophageal reflux disease without esophagitis    BMI 45.0-49.9, adult (HCC)    Post-surgical hypothyroidism    Rheumatoid arthritis involving multiple sites (HCC)    Thyroid cancer (HCC)    Depression with anxiety    Post traumatic stress disorder (PTSD)    Menorrhagia with regular cycle    NEO (obstructive sleep apnea)    Annual physical exam    Irritable bowel syndrome with diarrhea    Abnormal stress ECG with treadmill    Type 2 diabetes mellitus with hyperglycemia, without long-term current use of insulin (HCC)    Panic disorder without agoraphobia with severe panic attacks    Numbness    Hyperlipidemia associated with type 2 diabetes mellitus  (HCC)    Severe major depressive disorder (HCC)    Mild intermittent asthma with acute exacerbation       Current Outpatient Medications:     albuterol (PROVENTIL HFA,VENTOLIN HFA) 90 mcg/act inhaler, Inhale 2 puffs 4 (four) times a day, Disp: 8 g, Rfl: 0    ARIPiprazole (ABILIFY) 5 mg tablet, Take 5 mg by mouth daily, Disp: , Rfl:     ascorbic Acid (VITAMIN C) 500 MG CPCR, Take 500 mg by mouth daily, Disp: , Rfl:     carbamide peroxide (DEBROX) 6.5 % otic solution, Administer 5 drops into both ears in the morning for 4 days, Disp: 1 mL, Rfl: 0    Cholecalciferol (VITAMIN D-3) 5000 units TABS,  Take 5,000 Units by mouth, Disp: , Rfl:     Empagliflozin (Jardiance) 10 MG TABS tablet, Take 1 tablet (10 mg total) by mouth every morning, Disp: 90 tablet, Rfl: 1    folic acid (FOLVITE) 1 mg tablet, Take orally once a day, Disp: 90 tablet, Rfl: 1    gabapentin (Neurontin) 300 mg capsule, Take 1 capsule (300 mg total) by mouth 3 (three) times a day for 7 days, Disp: 21 capsule, Rfl: 0    levothyroxine 112 mcg tablet, Take 224 mcg by mouth daily, Disp: , Rfl:     methotrexate 2.5 mg tablet, Take 8 tablets by mouth once a week, Disp: , Rfl:     oxyCODONE (Roxicodone) 5 immediate release tablet, Take 1 tablet (5 mg total) by mouth every 6 (six) hours as needed for severe pain Max Daily Amount: 20 mg, Disp: 5 tablet, Rfl: 0    pantoprazole (PROTONIX) 40 mg tablet, TAKE ONE TABLET BY MOUTH EVERY DAY, Disp: 30 tablet, Rfl: 5    prazosin (MINIPRESS) 1 mg capsule, Take 2 mg by mouth daily at bedtime, Disp: , Rfl:     rosuvastatin (CRESTOR) 5 mg tablet, TAKE ONE TABLET BY MOUTH EVERY DAY, Disp: 90 tablet, Rfl: 1    sertraline (ZOLOFT) 100 mg tablet, Take 100 mg by mouth daily, Disp: , Rfl:     traZODone (DESYREL) 50 mg tablet, Take 1 tablet by mouth daily as needed, Disp: , Rfl:     Past Surgical History:   Procedure Laterality Date    CHOLECYSTECTOMY      COLONOSCOPY      KNEE SURGERY Left     ACL repair     KNEE SURGERY      CT EXC TUMOR SOFT TISS FACE&SCALP SUBFASCIAL <2CM N/A 12/10/2024    Procedure: EXCISION OF MULTIPLE PILAR CYSTS OF SCALP;  Surgeon: Orion Gibbs MD;  Location: [facility];  Service: Plastics    SKIN GRAFT Right     thumb    THYROIDECTOMY      US GUIDED THYROID BIOPSY  2018     Social History     Tobacco Use    Smoking status: Former     Current packs/day: 0.00     Average packs/day: 0.5 packs/day for 22.6 years (11.3 ttl pk-yrs)     Types: Cigarettes     Start date:      Quit date: 2015     Years since quittin.4    Smokeless tobacco: Never    Tobacco comments:     quit 5  years    Substance Use Topics    Alcohol use: Not Currently     Objective:  There were no vitals filed for this visit.    Physical Exam:  General: NAD, alert, cooperative    Skin: 6 incisions of scalp are clean, dry, and intact.  Staples and sutures intact, these were all removed.  Some overlying crusting but no active bleeding, or signs of infection.  No evidence of wound formation.    Assessment: 43-year-old female status post excision of multiple pilar cyst of the scalp    Plan:  The patient is doing well today.  I will see her back prn.  Pathology reviewed.  Advised patient to continue to apply ointment to her scalp if desired.  She may wash her hair gently. Discussed healing expectations. To call sooner with any questions or concerns.      Jorge Sarmiento PA-C  Plastic & Reconstructive Surgery

## 2025-02-12 DIAGNOSIS — R10.13 DYSPEPSIA: ICD-10-CM

## 2025-02-12 RX ORDER — PANTOPRAZOLE SODIUM 40 MG/1
40 TABLET, DELAYED RELEASE ORAL DAILY
Qty: 30 TABLET | Refills: 5 | Status: SHIPPED | OUTPATIENT
Start: 2025-02-12

## 2025-02-14 DIAGNOSIS — E11.9 DIET-CONTROLLED DIABETES MELLITUS (HCC): ICD-10-CM

## 2025-02-14 RX ORDER — EMPAGLIFLOZIN 10 MG/1
10 TABLET, FILM COATED ORAL EVERY MORNING
Qty: 90 TABLET | Refills: 1 | Status: SHIPPED | OUTPATIENT
Start: 2025-02-14

## 2025-03-01 DIAGNOSIS — E78.5 ELEVATED LIPIDS: ICD-10-CM

## 2025-03-01 DIAGNOSIS — E11.65 TYPE 2 DIABETES MELLITUS WITH HYPERGLYCEMIA, WITHOUT LONG-TERM CURRENT USE OF INSULIN (HCC): ICD-10-CM

## 2025-03-03 RX ORDER — ROSUVASTATIN CALCIUM 5 MG/1
5 TABLET, COATED ORAL DAILY
Qty: 30 TABLET | Refills: 0 | Status: SHIPPED | OUTPATIENT
Start: 2025-03-03

## 2025-06-12 ENCOUNTER — OFFICE VISIT (OUTPATIENT)
Dept: FAMILY MEDICINE CLINIC | Facility: CLINIC | Age: 45
End: 2025-06-12
Payer: COMMERCIAL

## 2025-06-12 ENCOUNTER — APPOINTMENT (OUTPATIENT)
Dept: LAB | Facility: HOSPITAL | Age: 45
End: 2025-06-12
Payer: COMMERCIAL

## 2025-06-12 VITALS
WEIGHT: 274 LBS | HEIGHT: 67 IN | OXYGEN SATURATION: 97 % | DIASTOLIC BLOOD PRESSURE: 80 MMHG | SYSTOLIC BLOOD PRESSURE: 110 MMHG | BODY MASS INDEX: 43 KG/M2 | TEMPERATURE: 97.4 F | RESPIRATION RATE: 16 BRPM | HEART RATE: 90 BPM

## 2025-06-12 DIAGNOSIS — R21 FACIAL RASH: ICD-10-CM

## 2025-06-12 DIAGNOSIS — E11.65 TYPE 2 DIABETES MELLITUS WITH HYPERGLYCEMIA, WITHOUT LONG-TERM CURRENT USE OF INSULIN (HCC): ICD-10-CM

## 2025-06-12 DIAGNOSIS — R20.0 NUMBNESS: ICD-10-CM

## 2025-06-12 DIAGNOSIS — E78.5 HYPERLIPIDEMIA ASSOCIATED WITH TYPE 2 DIABETES MELLITUS  (HCC): ICD-10-CM

## 2025-06-12 DIAGNOSIS — J45.21 MILD INTERMITTENT ASTHMA WITH ACUTE EXACERBATION: ICD-10-CM

## 2025-06-12 DIAGNOSIS — E11.69 HYPERLIPIDEMIA ASSOCIATED WITH TYPE 2 DIABETES MELLITUS  (HCC): ICD-10-CM

## 2025-06-12 DIAGNOSIS — E11.65 TYPE 2 DIABETES MELLITUS WITH HYPERGLYCEMIA, WITHOUT LONG-TERM CURRENT USE OF INSULIN (HCC): Primary | ICD-10-CM

## 2025-06-12 DIAGNOSIS — F32.2 SEVERE MAJOR DEPRESSIVE DISORDER (HCC): ICD-10-CM

## 2025-06-12 DIAGNOSIS — E78.5 ELEVATED LIPIDS: ICD-10-CM

## 2025-06-12 DIAGNOSIS — M06.9 RHEUMATOID ARTHRITIS INVOLVING MULTIPLE SITES, UNSPECIFIED WHETHER RHEUMATOID FACTOR PRESENT (HCC): ICD-10-CM

## 2025-06-12 PROBLEM — C73 THYROID CANCER (HCC): Status: RESOLVED | Noted: 2018-03-27 | Resolved: 2025-06-12

## 2025-06-12 LAB
ALBUMIN SERPL BCG-MCNC: 4.2 G/DL (ref 3.5–5)
ALP SERPL-CCNC: 48 U/L (ref 34–104)
ALT SERPL W P-5'-P-CCNC: 21 U/L (ref 7–52)
ANION GAP SERPL CALCULATED.3IONS-SCNC: 8 MMOL/L (ref 4–13)
AST SERPL W P-5'-P-CCNC: 17 U/L (ref 13–39)
BASOPHILS # BLD AUTO: 0.04 THOUSANDS/ÂΜL (ref 0–0.1)
BASOPHILS NFR BLD AUTO: 1 % (ref 0–1)
BILIRUB SERPL-MCNC: 0.67 MG/DL (ref 0.2–1)
BUN SERPL-MCNC: 17 MG/DL (ref 5–25)
CALCIUM SERPL-MCNC: 8.6 MG/DL (ref 8.4–10.2)
CHLORIDE SERPL-SCNC: 104 MMOL/L (ref 96–108)
CHOLEST SERPL-MCNC: 150 MG/DL (ref ?–200)
CO2 SERPL-SCNC: 25 MMOL/L (ref 21–32)
CREAT SERPL-MCNC: 0.92 MG/DL (ref 0.6–1.3)
CREAT UR-MCNC: 155.3 MG/DL
EOSINOPHIL # BLD AUTO: 0.12 THOUSAND/ÂΜL (ref 0–0.61)
EOSINOPHIL NFR BLD AUTO: 2 % (ref 0–6)
ERYTHROCYTE [DISTWIDTH] IN BLOOD BY AUTOMATED COUNT: 13.2 % (ref 11.6–15.1)
GFR SERPL CREATININE-BSD FRML MDRD: 75 ML/MIN/1.73SQ M
GLUCOSE P FAST SERPL-MCNC: 172 MG/DL (ref 65–99)
HCT VFR BLD AUTO: 42.6 % (ref 34.8–46.1)
HDLC SERPL-MCNC: 41 MG/DL
HGB BLD-MCNC: 14 G/DL (ref 11.5–15.4)
IMM GRANULOCYTES # BLD AUTO: 0.02 THOUSAND/UL (ref 0–0.2)
IMM GRANULOCYTES NFR BLD AUTO: 0 % (ref 0–2)
LDLC SERPL CALC-MCNC: 81 MG/DL (ref 0–100)
LYMPHOCYTES # BLD AUTO: 1.18 THOUSANDS/ÂΜL (ref 0.6–4.47)
LYMPHOCYTES NFR BLD AUTO: 16 % (ref 14–44)
MCH RBC QN AUTO: 28.9 PG (ref 26.8–34.3)
MCHC RBC AUTO-ENTMCNC: 32.9 G/DL (ref 31.4–37.4)
MCV RBC AUTO: 88 FL (ref 82–98)
MICROALBUMIN UR-MCNC: <7 MG/L
MONOCYTES # BLD AUTO: 0.41 THOUSAND/ÂΜL (ref 0.17–1.22)
MONOCYTES NFR BLD AUTO: 6 % (ref 4–12)
NEUTROPHILS # BLD AUTO: 5.68 THOUSANDS/ÂΜL (ref 1.85–7.62)
NEUTS SEG NFR BLD AUTO: 75 % (ref 43–75)
NRBC BLD AUTO-RTO: 0 /100 WBCS
PLATELET # BLD AUTO: 297 THOUSANDS/UL (ref 149–390)
PMV BLD AUTO: 9.4 FL (ref 8.9–12.7)
POTASSIUM SERPL-SCNC: 4.1 MMOL/L (ref 3.5–5.3)
PROT SERPL-MCNC: 7.4 G/DL (ref 6.4–8.4)
RBC # BLD AUTO: 4.85 MILLION/UL (ref 3.81–5.12)
SL AMB POCT HEMOGLOBIN AIC: 7.3 (ref ?–6.5)
SODIUM SERPL-SCNC: 137 MMOL/L (ref 135–147)
T4 FREE SERPL-MCNC: 0.95 NG/DL (ref 0.61–1.12)
TRIGL SERPL-MCNC: 138 MG/DL (ref ?–150)
TSH SERPL DL<=0.05 MIU/L-ACNC: 0.15 UIU/ML (ref 0.45–4.5)
VIT B12 SERPL-MCNC: 297 PG/ML (ref 180–914)
WBC # BLD AUTO: 7.45 THOUSAND/UL (ref 4.31–10.16)

## 2025-06-12 PROCEDURE — 80061 LIPID PANEL: CPT

## 2025-06-12 PROCEDURE — 86235 NUCLEAR ANTIGEN ANTIBODY: CPT

## 2025-06-12 PROCEDURE — 82043 UR ALBUMIN QUANTITATIVE: CPT

## 2025-06-12 PROCEDURE — 82570 ASSAY OF URINE CREATININE: CPT

## 2025-06-12 PROCEDURE — 85025 COMPLETE CBC W/AUTO DIFF WBC: CPT

## 2025-06-12 PROCEDURE — 80053 COMPREHEN METABOLIC PANEL: CPT

## 2025-06-12 PROCEDURE — 84439 ASSAY OF FREE THYROXINE: CPT

## 2025-06-12 PROCEDURE — 82607 VITAMIN B-12: CPT

## 2025-06-12 PROCEDURE — 99214 OFFICE O/P EST MOD 30 MIN: CPT | Performed by: NURSE PRACTITIONER

## 2025-06-12 PROCEDURE — 84443 ASSAY THYROID STIM HORMONE: CPT

## 2025-06-12 PROCEDURE — 86225 DNA ANTIBODY NATIVE: CPT

## 2025-06-12 PROCEDURE — 83036 HEMOGLOBIN GLYCOSYLATED A1C: CPT | Performed by: NURSE PRACTITIONER

## 2025-06-12 PROCEDURE — 36415 COLL VENOUS BLD VENIPUNCTURE: CPT

## 2025-06-12 PROCEDURE — 86038 ANTINUCLEAR ANTIBODIES: CPT

## 2025-06-12 RX ORDER — PRAZOSIN HYDROCHLORIDE 2 MG/1
2 CAPSULE ORAL
COMMUNITY
Start: 2025-06-09

## 2025-06-12 NOTE — ASSESSMENT & PLAN NOTE
Continue rosuvastatin 5 mg daily.  To obtain lipid panel, will call with results.  Lab Results   Component Value Date    HGBA1C 7.3 (A) 06/12/2025

## 2025-06-12 NOTE — ASSESSMENT & PLAN NOTE
Depression Screening Follow-up Plan: Patient's depression screening was positive with a PHQ-9 score of 18. Continue regular follow-up with their psychologist/therapist/psychiatrist who is managing their mental health condition(s).  Denies SI/HI.  Patient psychiatrist recently increased Zoloft to 150 mg daily.  To continue counseling and care with psychiatrist.

## 2025-06-12 NOTE — PROGRESS NOTES
Name: Vanessa Torres      : 1980      MRN: [id number]  Encounter Provider: CRIS Oleary  Encounter Date: 2025   Encounter department: [facility] [address]  :  Assessment & Plan  Type 2 diabetes mellitus with hyperglycemia, without long-term current use of insulin (HCC)    Lab Results   Component Value Date    HGBA1C 7.3 (A) 2025     Diabetes uncontrolled.  Patient admits to limited exercise and a poor diet.  She has an upcoming appointment with endocrinology on 2025.  To continue current diabetic regimen for now.  Counseled the importance of consuming a low-carb diet and engaging daily physical activity.  Foot exam completed, normal.  Follow-up in 6 months or sooner if needed.  Orders:    POCT hemoglobin A1c    Albumin / creatinine urine ratio; Future    CBC and differential; Future    Comprehensive metabolic panel; Future    Lipid Panel with Direct LDL reflex; Future    Facial rash  Patient has noted a facial rash that worsens when she is out in the sun for the past couple months.  She has a history of rheumatoid arthritis and is connected with rheumatology.  To proceed with additional labs, will call with results.  Counseled on protecting skin from the sun and avoidance if possible.  Orders:    TSH, 3rd generation with Free T4 reflex; Future    CASSIA Screen w/Reflex Cascade; Future    Sjogren's Antibodies; Future    Rheumatoid arthritis involving multiple sites, unspecified whether rheumatoid factor present (HCC)  Stable.  To continue current dose of methotrexate.  To continue care with rheumatology.       Severe major depressive disorder (HCC)  Depression Screening Follow-up Plan: Patient's depression screening was positive with a PHQ-9 score of 18. Continue regular follow-up with their psychologist/therapist/psychiatrist who is managing their mental health condition(s).  Denies SI/HI.  Patient psychiatrist recently increased Zoloft to 150  "mg daily.  To continue counseling and care with psychiatrist.       Mild intermittent asthma with acute exacerbation  Stable to continue albuterol as needed.       Hyperlipidemia associated with type 2 diabetes mellitus  (HCC)  Continue rosuvastatin 5 mg daily.  To obtain lipid panel, will call with results.  Lab Results   Component Value Date    HGBA1C 7.3 (A) 06/12/2025                   History of Present Illness   Becca presents with her wife for a follow-up.  She has noted a rash on her face that is worsened with sun exposure for the last several months.    Anxiety        Depression  Associated symptoms include a rash.     Review of Systems   Constitutional: Negative.    HENT: Negative.     Eyes: Negative.    Respiratory: Negative.     Cardiovascular: Negative.    Gastrointestinal: Negative.    Endocrine: Negative.    Genitourinary: Negative.    Musculoskeletal: Negative.    Skin:  Positive for rash.   Allergic/Immunologic: Negative.    Neurological: Negative.    Hematological: Negative.    Psychiatric/Behavioral:  Positive for depression.        Objective   /80 (BP Location: Left arm, Cuff Size: Large)   Pulse 90   Temp (!) 97.4 °F (36.3 °C)   Resp 16   Ht 5' 7\" (1.702 m)   Wt 124 kg (274 lb)   SpO2 97%   BMI 42.91 kg/m²      Physical Exam  Vitals and nursing note reviewed.   Constitutional:       General: She is not in acute distress.     Appearance: Normal appearance. She is well-developed. She is not ill-appearing, toxic-appearing or diaphoretic.   HENT:      Head: Normocephalic and atraumatic.     Eyes:      Conjunctiva/sclera: Conjunctivae normal.       Cardiovascular:      Rate and Rhythm: Normal rate and regular rhythm.      Pulses: no weak pulses.           Dorsalis pedis pulses are 2+ on the right side and 2+ on the left side.      Heart sounds: Normal heart sounds. No murmur heard.  Pulmonary:      Effort: Pulmonary effort is normal. No respiratory distress.      Breath sounds: Normal " breath sounds. No wheezing or rales.   Chest:      Chest wall: No tenderness.     Musculoskeletal:      Cervical back: Neck supple.   Feet:      Right foot:      Skin integrity: Callus and dry skin present. No ulcer, skin breakdown, erythema or warmth.      Left foot:      Skin integrity: Callus and dry skin present. No ulcer, skin breakdown, erythema or warmth.     Skin:     General: Skin is warm and dry.      Capillary Refill: Capillary refill takes less than 2 seconds.     Neurological:      General: No focal deficit present.      Mental Status: She is alert and oriented to person, place, and time.     Psychiatric:         Mood and Affect: Mood normal.         Behavior: Behavior normal.         Thought Content: Thought content normal.         Judgment: Judgment normal.     Patient's shoes and socks removed.    Right Foot/Ankle   Right Foot Inspection  Skin Exam: skin normal, skin intact, dry skin, callus and callus. No warmth, no erythema, no maceration, no abnormal color, no pre-ulcer and no ulcer.     Toe Exam: ROM and strength within normal limits.     Sensory   Monofilament testing: intact    Vascular  Capillary refills: < 3 seconds  The right DP pulse is 2+.     Left Foot/Ankle  Left Foot Inspection  Skin Exam: skin normal, skin intact, dry skin and callus. No warmth, no erythema, no maceration, normal color, no pre-ulcer and no ulcer.     Toe Exam: ROM and strength within normal limits.     Sensory   Monofilament testing: intact    Vascular  Capillary refills: < 3 seconds  The left DP pulse is 2+.     Assign Risk Category  No deformity present  No loss of protective sensation  No weak pulses  Risk: 0

## 2025-06-12 NOTE — ASSESSMENT & PLAN NOTE
Lab Results   Component Value Date    HGBA1C 7.3 (A) 06/12/2025     Diabetes uncontrolled.  Patient admits to limited exercise and a poor diet.  She has an upcoming appointment with endocrinology on 6/26/2025.  To continue current diabetic regimen for now.  Counseled the importance of consuming a low-carb diet and engaging daily physical activity.  Foot exam completed, normal.  Follow-up in 6 months or sooner if needed.  Orders:    POCT hemoglobin A1c    Albumin / creatinine urine ratio; Future    CBC and differential; Future    Comprehensive metabolic panel; Future    Lipid Panel with Direct LDL reflex; Future

## 2025-06-13 RX ORDER — ROSUVASTATIN CALCIUM 5 MG/1
5 TABLET, COATED ORAL DAILY
Qty: 30 TABLET | Refills: 5 | Status: SHIPPED | OUTPATIENT
Start: 2025-06-13

## 2025-06-16 LAB
DSDNA IGG SERPL IA-ACNC: <0.9 IU/ML (ref ?–15)
NUCLEAR IGG SER IA-RTO: <0.09 RATIO (ref ?–1)

## 2025-06-17 LAB
ENA SS-A AB SER IA-ACNC: <0.5 U/ML (ref ?–10)
ENA SS-B IGG SER IA-ACNC: <0.6 U/ML (ref ?–10)

## 2025-06-19 ENCOUNTER — RESULTS FOLLOW-UP (OUTPATIENT)
Dept: FAMILY MEDICINE CLINIC | Facility: CLINIC | Age: 45
End: 2025-06-19

## 2025-06-27 ENCOUNTER — HOSPITAL ENCOUNTER (OUTPATIENT)
Age: 45
Discharge: HOME | End: 2025-06-27
Payer: COMMERCIAL

## 2025-06-27 VITALS — BODY MASS INDEX: 43 KG/M2 | WEIGHT: 274 LBS | HEIGHT: 67 IN

## 2025-06-27 DIAGNOSIS — Z12.31 ENCOUNTER FOR SCREENING MAMMOGRAM FOR BREAST CANCER: ICD-10-CM

## 2025-06-27 PROCEDURE — 77067 SCR MAMMO BI INCL CAD: CPT

## 2025-06-27 PROCEDURE — 77063 BREAST TOMOSYNTHESIS BI: CPT

## 2025-08-18 ENCOUNTER — OFFICE VISIT (OUTPATIENT)
Age: 45
End: 2025-08-18
Payer: COMMERCIAL

## 2025-08-18 ENCOUNTER — APPOINTMENT (OUTPATIENT)
Age: 45
End: 2025-08-18
Payer: COMMERCIAL

## 2025-08-18 VITALS — SYSTOLIC BLOOD PRESSURE: 122 MMHG | WEIGHT: 278 LBS | DIASTOLIC BLOOD PRESSURE: 78 MMHG | BODY MASS INDEX: 43.54 KG/M2

## 2025-08-18 DIAGNOSIS — N89.8 VAGINAL ITCHING: ICD-10-CM

## 2025-08-18 DIAGNOSIS — Z12.4 SCREENING FOR CERVICAL CANCER: ICD-10-CM

## 2025-08-18 DIAGNOSIS — Z11.3 SCREENING FOR STD (SEXUALLY TRANSMITTED DISEASE): ICD-10-CM

## 2025-08-18 DIAGNOSIS — N75.0 BARTHOLIN GLAND CYST: ICD-10-CM

## 2025-08-18 DIAGNOSIS — Z01.419 ENCOUNTER FOR ANNUAL ROUTINE GYNECOLOGICAL EXAMINATION: Primary | ICD-10-CM

## 2025-08-18 DIAGNOSIS — Z12.31 ENCOUNTER FOR SCREENING MAMMOGRAM FOR BREAST CANCER: ICD-10-CM

## 2025-08-18 PROCEDURE — 87340 HEPATITIS B SURFACE AG IA: CPT

## 2025-08-18 PROCEDURE — 87591 N.GONORRHOEAE DNA AMP PROB: CPT

## 2025-08-18 PROCEDURE — G0476 HPV COMBO ASSAY CA SCREEN: HCPCS

## 2025-08-18 PROCEDURE — 87389 HIV-1 AG W/HIV-1&-2 AB AG IA: CPT

## 2025-08-18 PROCEDURE — 86780 TREPONEMA PALLIDUM: CPT

## 2025-08-18 PROCEDURE — S0610 ANNUAL GYNECOLOGICAL EXAMINA: HCPCS

## 2025-08-18 PROCEDURE — 36415 COLL VENOUS BLD VENIPUNCTURE: CPT

## 2025-08-18 PROCEDURE — 86803 HEPATITIS C AB TEST: CPT

## 2025-08-18 PROCEDURE — 87510 GARDNER VAG DNA DIR PROBE: CPT

## 2025-08-18 PROCEDURE — 87660 TRICHOMONAS VAGIN DIR PROBE: CPT

## 2025-08-18 PROCEDURE — 87491 CHLMYD TRACH DNA AMP PROBE: CPT

## 2025-08-18 PROCEDURE — G0145 SCR C/V CYTO,THINLAYER,RESCR: HCPCS

## 2025-08-18 PROCEDURE — 87480 CANDIDA DNA DIR PROBE: CPT

## 2025-08-19 LAB
CANDIDA RRNA VAG QL PROBE: NOT DETECTED
G VAGINALIS RRNA GENITAL QL PROBE: NOT DETECTED
HBV SURFACE AG SER QL: NORMAL
HCV AB SER QL: NORMAL
HIV 1+2 AB+HIV1 P24 AG SERPL QL IA: NORMAL
HPV HR 12 DNA CVX QL NAA+PROBE: NEGATIVE
HPV16 DNA CVX QL NAA+PROBE: NEGATIVE
HPV18 DNA CVX QL NAA+PROBE: NEGATIVE
T VAGINALIS RRNA GENITAL QL PROBE: NOT DETECTED
TREPONEMA PALLIDUM IGG+IGM AB [PRESENCE] IN SERUM OR PLASMA BY IMMUNOASSAY: NORMAL

## 2025-08-20 LAB
C TRACH DNA SPEC QL NAA+PROBE: NEGATIVE
N GONORRHOEA DNA SPEC QL NAA+PROBE: NEGATIVE

## 2025-08-21 ENCOUNTER — OFFICE VISIT (OUTPATIENT)
Dept: FAMILY MEDICINE CLINIC | Facility: CLINIC | Age: 45
End: 2025-08-21
Payer: COMMERCIAL

## 2025-08-21 VITALS
HEIGHT: 67 IN | DIASTOLIC BLOOD PRESSURE: 84 MMHG | OXYGEN SATURATION: 97 % | SYSTOLIC BLOOD PRESSURE: 130 MMHG | TEMPERATURE: 98 F | BODY MASS INDEX: 43 KG/M2 | HEART RATE: 73 BPM | WEIGHT: 274 LBS

## 2025-08-21 DIAGNOSIS — E78.5 HYPERLIPIDEMIA ASSOCIATED WITH TYPE 2 DIABETES MELLITUS  (HCC): ICD-10-CM

## 2025-08-21 DIAGNOSIS — E11.69 HYPERLIPIDEMIA ASSOCIATED WITH TYPE 2 DIABETES MELLITUS  (HCC): ICD-10-CM

## 2025-08-21 DIAGNOSIS — Z00.00 ANNUAL PHYSICAL EXAM: Primary | ICD-10-CM

## 2025-08-21 DIAGNOSIS — F32.2 SEVERE MAJOR DEPRESSIVE DISORDER (HCC): ICD-10-CM

## 2025-08-21 DIAGNOSIS — M06.9 RHEUMATOID ARTHRITIS INVOLVING MULTIPLE SITES, UNSPECIFIED WHETHER RHEUMATOID FACTOR PRESENT (HCC): ICD-10-CM

## 2025-08-21 DIAGNOSIS — E11.65 TYPE 2 DIABETES MELLITUS WITH HYPERGLYCEMIA, WITHOUT LONG-TERM CURRENT USE OF INSULIN (HCC): ICD-10-CM

## 2025-08-21 DIAGNOSIS — H61.23 BILATERAL IMPACTED CERUMEN: ICD-10-CM

## 2025-08-21 PROCEDURE — 99214 OFFICE O/P EST MOD 30 MIN: CPT | Performed by: NURSE PRACTITIONER

## 2025-08-21 PROCEDURE — 69210 REMOVE IMPACTED EAR WAX UNI: CPT | Performed by: NURSE PRACTITIONER

## 2025-08-21 PROCEDURE — 99396 PREV VISIT EST AGE 40-64: CPT | Performed by: NURSE PRACTITIONER

## 2025-08-21 RX ORDER — PRAZOSIN HYDROCHLORIDE 1 MG/1
1 CAPSULE ORAL
COMMUNITY
Start: 2025-08-01

## 2025-08-22 LAB
LAB AP GYN PRIMARY INTERPRETATION: NORMAL
Lab: NORMAL

## (undated) DEVICE — PLUMEPEN PRO 10FT

## (undated) DEVICE — INTENDED FOR TISSUE SEPARATION, AND OTHER PROCEDURES THAT REQUIRE A SHARP SURGICAL BLADE TO PUNCTURE OR CUT.: Brand: BARD-PARKER SAFETY BLADES SIZE 10, STERILE

## (undated) DEVICE — GLOVE SRG BIOGEL 7

## (undated) DEVICE — PAD GROUNDING DUAL ADULT

## (undated) DEVICE — KELLY HEMOSTAT CURVED: Brand: CARDINAL HEALTH

## (undated) DEVICE — GLOVE INDICATOR PI UNDERGLOVE SZ 7 BLUE

## (undated) DEVICE — INTENDED FOR TISSUE SEPARATION, AND OTHER PROCEDURES THAT REQUIRE A SHARP SURGICAL BLADE TO PUNCTURE OR CUT.: Brand: BARD-PARKER SAFETY BLADES SIZE 15, STERILE

## (undated) DEVICE — PACK PBDS PLASTIC HEAD AND NECK RF

## (undated) DEVICE — ELECTRODE BLADE MOD E-Z CLEAN  2.75IN 7CM -0012AM